# Patient Record
Sex: MALE | Race: WHITE | NOT HISPANIC OR LATINO | Employment: OTHER | URBAN - METROPOLITAN AREA
[De-identification: names, ages, dates, MRNs, and addresses within clinical notes are randomized per-mention and may not be internally consistent; named-entity substitution may affect disease eponyms.]

---

## 2017-01-12 ENCOUNTER — TELEPHONE (OUTPATIENT)
Dept: FAMILY MEDICINE | Facility: CLINIC | Age: 62
End: 2017-01-12

## 2017-01-12 NOTE — TELEPHONE ENCOUNTER
Patient due for colonoscopy/FIT test. Left message for patient to call back. Reminder letter sent in addition.  Anthony Garcia CMA

## 2017-01-12 NOTE — Clinical Note
[facility]  [address] [address]  [phone]  Dept: [phone]      January 12, 2017      Stephane Shaikh  [address] [place] [phone]        Dear Stephane Shaikh,     At [facility] we care about your health and are committed to providing quality patient care. Which includes staying current on preventive cancer screenings.  You can increase your chances of finding and treating cancers through regular screenings.      Our records indicate you may be due for the following preventive screening(s):    Colonoscopy  Colonoscopy is recommended every ten years for everyone age 50 and older. Please take a moment to read over the enclosed information packet about colon cancer screening. We strongly urge our patient's to consider having a colonoscopy done, which is the best screening test available and only needs to be done every 10 years if normal. If you are unwilling or unable to have a colonoscopy then we recommend the annual stool testing for blood. This test is called a FIT test and it looks for blood in the stool. A kit can be picked up from our clinic lab department.    To schedule an appointment or discuss this screening further, you may contact us by phone at the [facility] at [phone] or online through the patient [email or web address] @ [email or web address]    If you have had any of the screenings listed above at another facility, please call us so that we may update your chart.      Your partners in health,      Quality Committee at [facility]

## 2017-05-23 ENCOUNTER — TELEPHONE (OUTPATIENT)
Dept: FAMILY MEDICINE | Facility: CLINIC | Age: 62
End: 2017-05-23

## 2017-05-23 NOTE — LETTER
[facility]  [address] [address]  [phone]  Dept: [phone]      May 23, 2017      Stephane Shaikh  [address] [place] [id number]        Dear Sam,     At [facility] we care about your health and are committed to providing quality patient care. Which includes staying current on preventive cancer screenings.  You can increase your chances of finding and treating cancers through regular screenings.      Our records indicate you may be due for the following preventive screening(s):    Colonoscopy  Colonoscopy is recommended every ten years for everyone age 50 and older. Please take a moment to read over the enclosed information packet about colon cancer screening. We strongly urge our patient's to consider having a colonoscopy done, which is the best screening test available and only needs to be done every 10 years if normal.    If you are unwilling or unable to have a colonoscopy then we recommend the annual stool testing for blood. This test is called a FIT test and it looks for blood in the stool. You may request a kit from our clinic lab department then return back at your earliest convenience.    To schedule an appointment or discuss this screening further, you may contact us by phone at the [facility] at [phone] or online through the patient portal/[email or web address] @ [email or web address]    If you have had any of the screenings listed above at another facility, please call us so that we may update your chart.      Your partners in health,      Quality Committee at [facility]

## 2017-05-23 NOTE — TELEPHONE ENCOUNTER
Panel Management Review      BP Readings from Last 1 Encounters:   07/22/16 128/72    ,   Lab Results   Component Value Date    A1C 5.2 07/22/2016   , 1/12/2017    Fail List measure: Colonoscopy      Patient is due/failing the following:   COLONOSCOPY    Action needed:   Schedule colonoscopy    Type of outreach:    Phone, left message for patient to call back.  and Sent letter.    Questions for provider review:    None                                                                                                                                    Anthony Garcia      Chart routed to  .

## 2017-07-25 ENCOUNTER — TELEPHONE (OUTPATIENT)
Dept: FAMILY MEDICINE | Facility: CLINIC | Age: 62
End: 2017-07-25

## 2017-07-25 NOTE — TELEPHONE ENCOUNTER
7/25/2017    Call Regarding Preventive Health Screening Colonoscopy    Attempt 3    Message on voicemail     Comments: Clinic made 2 prior attempts       Outreach   CC

## 2018-06-07 ENCOUNTER — OFFICE VISIT (OUTPATIENT)
Dept: OPTOMETRY | Facility: CLINIC | Age: 63
End: 2018-06-07
Payer: COMMERCIAL

## 2018-06-07 DIAGNOSIS — H52.203 MYOPIA OF BOTH EYES WITH ASTIGMATISM AND PRESBYOPIA: Primary | ICD-10-CM

## 2018-06-07 DIAGNOSIS — H52.13 MYOPIA OF BOTH EYES WITH ASTIGMATISM AND PRESBYOPIA: Primary | ICD-10-CM

## 2018-06-07 DIAGNOSIS — H52.4 MYOPIA OF BOTH EYES WITH ASTIGMATISM AND PRESBYOPIA: Primary | ICD-10-CM

## 2018-06-07 PROCEDURE — 92004 COMPRE OPH EXAM NEW PT 1/>: CPT | Performed by: OPTOMETRIST

## 2018-06-07 PROCEDURE — 92015 DETERMINE REFRACTIVE STATE: CPT | Performed by: OPTOMETRIST

## 2018-06-07 ASSESSMENT — REFRACTION_MANIFEST
OD_SPHERE: -3.50
OS_AXIS: 062
OD_SPHERE: -3.25
OD_CYLINDER: +1.00
OD_AXIS: 125
OS_SPHERE: -3.00
OD_CYLINDER: +1.25
OS_CYLINDER: +0.75
OS_AXIS: 062
OD_AXIS: 115
OS_ADD: +2.50
OD_ADD: +2.50
OS_SPHERE: -3.00
OS_CYLINDER: +1.25

## 2018-06-07 ASSESSMENT — CONF VISUAL FIELD
OS_NORMAL: 1
METHOD: COUNTING FINGERS
OD_NORMAL: 1

## 2018-06-07 ASSESSMENT — VISUAL ACUITY
CORRECTION_TYPE: GLASSES
METHOD: SNELLEN - LINEAR
OS_CC+: -1
OD_SC: 20/200
OD_CC: 20/30-1
OS_CC: 20/20-1
OS_SC: 20/200
OD_CC: 20/20
OS_CC: 20/20

## 2018-06-07 ASSESSMENT — CUP TO DISC RATIO
OS_RATIO: 0.45
OD_RATIO: 0.5

## 2018-06-07 ASSESSMENT — SLIT LAMP EXAM - LIDS
COMMENTS: NORMAL
COMMENTS: NORMAL

## 2018-06-07 ASSESSMENT — TONOMETRY
OD_IOP_MMHG: 13
OS_IOP_MMHG: 15
IOP_METHOD: APPLANATION

## 2018-06-07 ASSESSMENT — REFRACTION_WEARINGRX
SPECS_TYPE: PAL
OS_SPHERE: -3.50
OD_AXIS: 120
OS_CYLINDER: +1.00
OS_ADD: +2.50
OD_SPHERE: -3.75
OD_ADD: +2.50
OD_CYLINDER: +1.25
OS_AXIS: 050

## 2018-06-07 ASSESSMENT — EXTERNAL EXAM - RIGHT EYE: OD_EXAM: NORMAL

## 2018-06-07 ASSESSMENT — EXTERNAL EXAM - LEFT EYE: OS_EXAM: NORMAL

## 2018-06-07 NOTE — PROGRESS NOTES
Chief Complaint   Patient presents with     COMPREHENSIVE EYE EXAM         Last Eye Exam: 5 years  Dilated Previously: Yes    What are you currently using to see?  glasses       Distance Vision Acuity: Satisfied with vision    Near Vision Acuity: Not satisfied  - easier to read book without glasses    Eye Comfort: good  Do you use eye drops? : No  Occupation or Hobbies: Teacher    Roxi Negron  [email or web address]            Medical, surgical and family histories reviewed and updated 6/7/2018.       OBJECTIVE: See Ophthalmology exam    ASSESSMENT:    ICD-10-CM    1. Myopia of both eyes with astigmatism and presbyopia H52.13     H52.203     H52.4       PLAN:     Patient Instructions   There was a change in the prescription for your glasses.    Your eyes may be blurry at near and sensitive to light for several hours from the dilating drops.    Yearly eye exams recommended.

## 2018-06-07 NOTE — PATIENT INSTRUCTIONS
There was a change in the prescription for your glasses.    Your eyes may be blurry at near and sensitive to light for several hours from the dilating drops.    Yearly eye exams recommended.

## 2018-06-07 NOTE — MR AVS SNAPSHOT
"              After Visit Summary   6/7/2018    Stephane Shaikh    MRN: [id number]           Patient Information     Date Of Birth          1955        Visit Information        Provider Department      6/7/2018 5:00 PM Joselin Adam OD [facility]        Today's Diagnoses     Myopia of both eyes with astigmatism and presbyopia    -  1      Care Instructions    There was a change in the prescription for your glasses.    Your eyes may be blurry at near and sensitive to light for several hours from the dilating drops.    Yearly eye exams recommended.            Follow-ups after your visit        Follow-up notes from your care team     Return in about 1 year (around 6/7/2019) for comprehensive eye exam.      Who to contact     If you have questions or need follow up information about today's clinic visit or your schedule please contact [facility] directly at [phone].  Normal or non-critical lab and imaging results will be communicated to you by MyChart, letter or phone within 4 business days after the clinic has received the results. If you do not hear from us within 7 days, please contact the clinic through MyChart or phone. If you have a critical or abnormal lab result, we will notify you by phone as soon as possible.  Submit refill requests through [email or web address] or call your pharmacy and they will forward the refill request to us. Please allow 3 business days for your refill to be completed.          Additional Information About Your Visit        MyChart Information     [email or web address] lets you send messages to your doctor, view your test results, renew your prescriptions, schedule appointments and more. To sign up, go to [email or web address] . Click on \"Log in\" on the left side of the screen, which will take you to the Welcome page. Then click on \"Sign up Now\" on the right side of the page.     You will be asked to enter the access code listed below, as well as some " personal information. Please follow the directions to create your username and password.     Your access code is: [email or web address]  Expires: 2018  6:07 PM     Your access code will  in 90 days. If you need help or a new code, please call your [place] clinic or [phone].        Care EveryWhere ID     This is your Care EveryWhere ID. This could be used by other organizations to access your [place] medical records  [id number]         Blood Pressure from Last 3 Encounters:   16 128/72   16 138/78   14 127/83    Weight from Last 3 Encounters:   16 129.3 kg (285 lb)   16 131.1 kg (289 lb)   14 (!) 140.2 kg (309 lb)              We Performed the Following     EYE EXAM (SIMPLE-NONBILLABLE)     REFRACTION        Primary Care Provider    None Specified       No primary provider on file.        Equal Access to Services     MILANA MCCLELLAN : Hadfer Acosta, waomada kayleigh, qacassta kaalmamina turner, mariza cid . So [facility] [phone].    ATENCIÓN: Si habla español, tiene a loaiza disposición servicios gratuitos de asistencia lingüística. Llame al [phone].    We comply with applicable federal civil rights laws and [place] laws. We do not discriminate on the basis of race, color, national origin, age, disability, sex, sexual orientation, or gender identity.            Thank you!     Thank you for choosing [facility]  for your care. Our goal is always to provide you with excellent care. Hearing back from our patients is one way we can continue to improve our services. Please take a few minutes to complete the written survey that you may receive in the mail after your visit with us. Thank you!             Your Updated Medication List - Protect others around you: Learn how to safely use, store and throw away your medicines at www.disposemymeds.org.      Notice  As of 2018  6:07 PM    You have not been prescribed any  medications.

## 2020-07-22 ENCOUNTER — OFFICE VISIT (OUTPATIENT)
Dept: OPTOMETRY | Facility: CLINIC | Age: 65
End: 2020-07-22
Payer: COMMERCIAL

## 2020-07-22 DIAGNOSIS — H52.13 MYOPIA OF BOTH EYES: ICD-10-CM

## 2020-07-22 DIAGNOSIS — Z01.00 EXAMINATION OF EYES AND VISION: Primary | ICD-10-CM

## 2020-07-22 DIAGNOSIS — H52.4 PRESBYOPIA: ICD-10-CM

## 2020-07-22 DIAGNOSIS — H52.223 REGULAR ASTIGMATISM OF BOTH EYES: ICD-10-CM

## 2020-07-22 PROCEDURE — 92014 COMPRE OPH EXAM EST PT 1/>: CPT | Performed by: OPTOMETRIST

## 2020-07-22 PROCEDURE — 92015 DETERMINE REFRACTIVE STATE: CPT | Performed by: OPTOMETRIST

## 2020-07-22 ASSESSMENT — REFRACTION_MANIFEST
OD_ADD: +2.75
OD_SPHERE: -3.50
OS_CYLINDER: +1.25
OS_ADD: +2.75
OS_AXIS: 062
OD_CYLINDER: +1.50
OS_SPHERE: -3.25
OD_AXIS: 115

## 2020-07-22 ASSESSMENT — EXTERNAL EXAM - LEFT EYE: OS_EXAM: NORMAL

## 2020-07-22 ASSESSMENT — VISUAL ACUITY
OS_CC+: -1
OS_CC: 20/30
OD_CC: 20/20-1
OS_CC: 20/20-1
OD_SC: 20/400-
CORRECTION_TYPE: GLASSES
OD_CC+: -2
METHOD: SNELLEN - LINEAR
OS_SC: 20/150
OD_CC: 20/20

## 2020-07-22 ASSESSMENT — REFRACTION_WEARINGRX
OS_ADD: +2.50
OS_CYLINDER: +1.25
OD_AXIS: 115
SPECS_TYPE: PAL
OD_CYLINDER: +1.25
OS_AXIS: 062
OD_ADD: +2.50
OD_SPHERE: -3.25
OS_SPHERE: -3.00

## 2020-07-22 ASSESSMENT — TONOMETRY
OD_IOP_MMHG: 14
OS_IOP_MMHG: 14
IOP_METHOD: TONOPEN

## 2020-07-22 ASSESSMENT — CONF VISUAL FIELD
OD_NORMAL: 1
OS_NORMAL: 1

## 2020-07-22 ASSESSMENT — CUP TO DISC RATIO
OD_RATIO: 0.5
OS_RATIO: 0.45

## 2020-07-22 ASSESSMENT — SLIT LAMP EXAM - LIDS
COMMENTS: NORMAL
COMMENTS: NORMAL

## 2020-07-22 ASSESSMENT — EXTERNAL EXAM - RIGHT EYE: OD_EXAM: NORMAL

## 2020-07-22 NOTE — LETTER
7/22/2020         RE: Stephane Shaikh  [address] [place] [phone]        Dear Colleague,    Thank you for referring your patient, Stephane Shaikh, to the [facility]. Please see a copy of my visit note below.    Chief Complaint   Patient presents with     Annual Eye Exam      Accompanied by self  Last Eye Exam: 6-7-2018  Dilated Previously: Yes    What are you currently using to see?  glasses       Distance Vision Acuity: Noticed gradual change in both eyes    Near Vision Acuity: Satisfied with vision while reading  with glasses    Eye Comfort: good  Do you use eye drops? 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          Medical, surgical and family histories reviewed and updated 7/22/2020.       OBJECTIVE: See Ophthalmology exam    ASSESSMENT:    ICD-10-CM    1. Examination of eyes and vision  Z01.00 EYE EXAM (SIMPLE-NONBILLABLE)   2. Myopia of both eyes  H52.13 REFRACTION   3. Regular astigmatism of both eyes  H52.223 REFRACTION   4. Presbyopia  H52.4 REFRACTION      PLAN:     Patient Instructions   Eyeglass prescription given.    Return in 1 year for a complete eye exam or sooner if needed.    Ric Melgar OD           Again, thank you for allowing me to participate in the care of your patient.        Sincerely,        Ric Melgar OD

## 2020-07-22 NOTE — PATIENT INSTRUCTIONS
Eyeglass prescription given.    Return in 1 year for a complete eye exam or sooner if needed.    Ric Melgar OD      The affects of the dilating drops last for 4- 6 hours.  You will be more sensitive to light and vision will be blurry up close.  Mydriatic sunglasses were given if needed.      Optometry Providers       Clinic Locations                                 Telephone Number   Dr. Sandra Chávez [phone]     [facility] Hours:                [facility] Hours:       [facility] Hours:   [address]   [address]     [address]  [place] [address]   [place] [address]    [place] [address]  Phone: [phone]                    Phone: [phone]     Phone: [phone]                      Monday 8:00-7:00                          Monday 8:00-7:00                          Monday 8:00-7:00              Tuesday 8:00-6:00                          Tuesday 8:00-7:00                          Tuesday 8:00-7:00              Wednesday 8:00-6:00                  Wednesday 8:00-7:00                   Wednesday 8:00-7:00      Thursday 8:00-6:00                        Thursday 8:00-7:00                         Thursday 8:00-7:00            Friday 8:00-5:00                              Friday 8:00-5:00                              Friday 8:00-5:00    [facility] Hours:   [address] [facility] [place] [address]  [phone]    Monday 8:00-7:00  Tuesday 8:00-7:00  Wednesday 8:00-7:00  Thursday 8:00-7:00  Friday 8:00-5:00  Please log on to [email or web address] to order your contact lenses.  The link is found on the Eye Care and Vision Services page.  As always, Thank you for trusting us with your health care needs!

## 2020-07-22 NOTE — PROGRESS NOTES
Chief Complaint   Patient presents with     Annual Eye Exam      Accompanied by self  Last Eye Exam: 6-7-2018  Dilated Previously: Yes    What are you currently using to see?  glasses       Distance Vision Acuity: Noticed gradual change in both eyes    Near Vision Acuity: Satisfied with vision while reading  with glasses    Eye Comfort: good  Do you use eye drops? 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          Medical, surgical and family histories reviewed and updated 7/22/2020.       OBJECTIVE: See Ophthalmology exam    ASSESSMENT:    ICD-10-CM    1. Examination of eyes and vision  Z01.00 EYE EXAM (SIMPLE-NONBILLABLE)   2. Myopia of both eyes  H52.13 REFRACTION   3. Regular astigmatism of both eyes  H52.223 REFRACTION   4. Presbyopia  H52.4 REFRACTION      PLAN:     Patient Instructions   Eyeglass prescription given.    Return in 1 year for a complete eye exam or sooner if needed.    Ric Melgar, OD

## 2021-06-10 NOTE — PROGRESS NOTES
Assessment & Plan     (D33.3) Acoustic neuroma (H)  (primary encounter diagnosis)  Comment:   Plan: OTOLARYNGOLOGY REFERRAL        He had in his chart acoustic neuroma there was an MRI from a few years ago that did not show any abnormalities.  He states he was told he had an acoustic neuroma.  We will have him see ENT.  He still has hearing changes on the left side.    (Z12.5) Screening for prostate cancer  Comment:   Plan: PSA, screen        Check PSA    (Z13.220) Lipid screening  Comment:   Plan: Lipid Profile        He is fasting would like to get a lipid profile    (Z12.11) Screening for colon cancer  Comment:   Plan: COLOGUARD([employer])        He is willing to do a Cologuard prescription sent    (Z68.34) BMI 34.0-34.9,adult  Comment: His BMI is coming down his previous weight was 310 he is now down to 235.  Plan:                          HELENE Alarcon MD  [facility] - KERI Vargas is a 66 year old who presents for the following health issues     HPI     New Patient/Transfer of Care  New patient he did have a diagnosis of acoustic neuroma still has hearing changes on the left side.  He was gaining a lot of weight and he worked hard with diet and exercise and has lost significant amount of weight and is feeling much better.  We will do fasting labs today note he has no chest pain or shortness of breath and does not smoke    Review of Systems   Constitutional, HEENT, cardiovascular, pulmonary, gi and gu systems are negative, except as otherwise noted.      Objective    /80   Pulse 65   Temp 96.9  F (36.1  C)   Resp 18   Wt 111.1 kg (245 lb)   SpO2 98%   BMI 34.19 kg/m    Body mass index is 34.19 kg/m .  Physical Exam   GENERAL: healthy, alert and no distress  EYES: Eyes grossly normal to inspection, PERRL and conjunctivae and sclerae normal  NECK: no adenopathy, no asymmetry, masses, or scars and thyroid normal to palpation  RESP: lungs clear to auscultation - no  rales, rhonchi or wheezes  CV: regular rate and rhythm, normal S1 S2, no S3 or S4, no murmur, click or rub, no peripheral edema and peripheral pulses strong  ABDOMEN: soft, nontender, no hepatosplenomegaly, no masses and bowel sounds normal  MS: no gross musculoskeletal defects noted, no edema

## 2021-06-15 ENCOUNTER — OFFICE VISIT (OUTPATIENT)
Dept: FAMILY MEDICINE | Facility: OTHER | Age: 66
End: 2021-06-15
Attending: FAMILY MEDICINE
Payer: COMMERCIAL

## 2021-06-15 VITALS
DIASTOLIC BLOOD PRESSURE: 80 MMHG | OXYGEN SATURATION: 98 % | HEART RATE: 65 BPM | RESPIRATION RATE: 18 BRPM | WEIGHT: 245 LBS | BODY MASS INDEX: 34.19 KG/M2 | SYSTOLIC BLOOD PRESSURE: 138 MMHG | TEMPERATURE: 96.9 F

## 2021-06-15 DIAGNOSIS — D33.3 ACOUSTIC NEUROMA (H): Primary | ICD-10-CM

## 2021-06-15 DIAGNOSIS — Z12.11 SCREENING FOR COLON CANCER: ICD-10-CM

## 2021-06-15 DIAGNOSIS — Z13.220 LIPID SCREENING: Primary | ICD-10-CM

## 2021-06-15 DIAGNOSIS — Z12.5 SCREENING FOR PROSTATE CANCER: ICD-10-CM

## 2021-06-15 DIAGNOSIS — E78.5 HYPERLIPIDEMIA LDL GOAL <100: ICD-10-CM

## 2021-06-15 DIAGNOSIS — Z13.220 LIPID SCREENING: ICD-10-CM

## 2021-06-15 LAB
CHOLEST SERPL-MCNC: 208 MG/DL
HDLC SERPL-MCNC: 43 MG/DL
LDLC SERPL CALC-MCNC: 119 MG/DL
NONHDLC SERPL-MCNC: 165 MG/DL
PSA SERPL-ACNC: 1 UG/L (ref 0–4)
TRIGL SERPL-MCNC: 228 MG/DL

## 2021-06-15 PROCEDURE — 99203 OFFICE O/P NEW LOW 30 MIN: CPT | Performed by: FAMILY MEDICINE

## 2021-06-15 PROCEDURE — G0103 PSA SCREENING: HCPCS | Performed by: FAMILY MEDICINE

## 2021-06-15 PROCEDURE — 36415 COLL VENOUS BLD VENIPUNCTURE: CPT | Performed by: FAMILY MEDICINE

## 2021-06-15 PROCEDURE — 80061 LIPID PANEL: CPT | Performed by: FAMILY MEDICINE

## 2021-06-15 RX ORDER — ATORVASTATIN CALCIUM 10 MG/1
10 TABLET, FILM COATED ORAL DAILY
Qty: 90 TABLET | Refills: 3 | Status: SHIPPED | OUTPATIENT
Start: 2021-06-15 | End: 2022-06-14

## 2021-06-15 ASSESSMENT — ANXIETY QUESTIONNAIRES
4. TROUBLE RELAXING: NOT AT ALL
7. FEELING AFRAID AS IF SOMETHING AWFUL MIGHT HAPPEN: NOT AT ALL
6. BECOMING EASILY ANNOYED OR IRRITABLE: NOT AT ALL
2. NOT BEING ABLE TO STOP OR CONTROL WORRYING: NOT AT ALL
3. WORRYING TOO MUCH ABOUT DIFFERENT THINGS: NOT AT ALL
5. BEING SO RESTLESS THAT IT IS HARD TO SIT STILL: NOT AT ALL
GAD7 TOTAL SCORE: 0
1. FEELING NERVOUS, ANXIOUS, OR ON EDGE: NOT AT ALL

## 2021-06-15 ASSESSMENT — PATIENT HEALTH QUESTIONNAIRE - PHQ9: SUM OF ALL RESPONSES TO PHQ QUESTIONS 1-9: 0

## 2021-06-15 ASSESSMENT — PAIN SCALES - GENERAL: PAINLEVEL: NO PAIN (0)

## 2021-06-15 NOTE — NURSING NOTE
"Chief Complaint   Patient presents with     Establish Care       Initial /80   Pulse 65   Temp 96.9  F (36.1  C)   Resp 18   Wt 111.1 kg (245 lb)   SpO2 98%   BMI 34.19 kg/m   Estimated body mass index is 34.19 kg/m  as calculated from the following:    Height as of 7/22/16: 1.803 m (5' 10.98\").    Weight as of this encounter: 111.1 kg (245 lb).  Medication Reconciliation: callie Giraldo  "

## 2021-06-16 ASSESSMENT — ANXIETY QUESTIONNAIRES: GAD7 TOTAL SCORE: 0

## 2021-06-22 NOTE — PROGRESS NOTES
Otolaryngology Consultation    Patient: Stephane Shaikh  : 1955    Patient presents with:  Consult: Pt has been referred by Dr. Pizano for an acoustic neuroma.      HPI:  Stephane Shaikh is a 66 year old male seen today for concerns of acoustic neuroma.   He has a hx of sudden hearing loss. He was seen on 6/15/21 for establish medical care by Dr. Alarcon. At that time, he was describing a sudden hearing loss. He was treated for SSNHL with prednisone and complete MRI of IAC. Imaging reviewed from  and was negative for neuroma.     Today, he presents for concerns of hearing changes and hx of sudden HL.   His sudden hearing loss developed in , and was seen about 1 month after onset by ENT. He was noted to have significant hearing loss change on left and was started on Prednisone, but Sam does not recall taking prednisone.   He had no improvement and reports hearing remains poor and interested in ANG    Sam did complete imaging of MRI of IAC to examine IAC. His ear exam was normal and IAC were clear. MRI report reviewed under imaging.       Denies COM.   Hx of childhood BTT. No adult OM.   Denies otalgia, otorrhea.  Denies worrisome tinnitus.  Denies fluctuating hearing loss or tinnitus.   Denies vertigo or facial paraesthesia.   Hx of noise exposure, .   He has HP   Family Hx of HL, age related.     Audiogram- 14  Type A tympanograms  Thresholds are slight sloping to severe right and profound on left.  SRT=PTA  WRS-        Audiogram- 21  Type A tympanograms  Thresholds are overall stable compared to 2014- slight sloping to severe right and profound left asymmetrical SNHL.   SRT=PTA  WRS-  Right- 100%@70dB  Left- 60% @95 dB      MRI BRAIN WITHOUT AND WITH CONTRAST  2014 11:05 AM     HISTORY:  Hearing loss in left ear. Intermittent vertigo.      TECHNIQUE:  Multiplanar, multisequence MRI of the brain without and  with 14 mL IV Gadavist.     COMPARISON: None.     FINDINGS:  The  "brain parenchyma, ventricles and subarachnoid spaces  appear normal.  There is no evidence of hemorrhage, mass, acute  infarct, or anomaly.  There are no gadolinium enhancing lesions.       The acoustic pathways appear normal.  The temporal bones, internal  auditory canals and cerebellopontine angles appear normal with no  abnormal signal or enhancement in the labyrinths.     The facial structures appear normal. The arteries at the base of the  brain and the dural venous sinuses appear patent.      IMPRESSION  IMPRESSION:       Normal MRI of the brain.       Current Outpatient Rx   Medication Sig Dispense Refill     atorvastatin (LIPITOR) 10 MG tablet Take 1 tablet (10 mg) by mouth daily 90 tablet 3       Allergies: Patient has no known allergies.     Past Medical History:   Diagnosis Date     Acoustic neuroma (H) 6/14/2016    left      Heart murmur      History of vasectomy 1996     Hx of tonsillectomy      Obesity, Class III, BMI 40-49.9 (morbid obesity) (H) 6/23/2014     Rheumatic fever        Past Surgical History:   Procedure Laterality Date     ARTHROSCOPY KNEE RT/LT Right remote     ARTHROSCOPY SHOULDER RT/LT Right 2011    rotator cuff repair       ENT family history reviewed    Social History     Tobacco Use     Smoking status: Never Smoker     Smokeless tobacco: Never Used   Substance Use Topics     Alcohol use: Yes     Alcohol/week: 0.0 - 1.0 standard drinks     Drug use: No       Review of Systems  ROS: 10 point ROS neg other than the symptoms noted above in the HPI     Physical Exam  /76 (Cuff Size: Adult Regular)   Pulse 66   Temp 97.9  F (36.6  C) (Tympanic)   Ht 1.803 m (5' 11\")   Wt 108 kg (238 lb)   SpO2 98%   BMI 33.19 kg/m    General - The patient is well nourished and well developed, and appears to have good nutritional status.  Alert and oriented to person and place, answers questions and cooperates with examination appropriately.   Head and Face - Normocephalic and atraumatic, with " no gross asymmetry noted.  The facial nerve is intact, with strong symmetric movements.  Voice and Breathing - The patient was breathing comfortably without the use of accessory muscles. There was no wheezing, stridor, or stertor.  The patients voice was clear and strong, and had appropriate pitch and quality.  Ears - Ears examined with otoscope and under otologic microscopy. The external auditory canals are patent, the tympanic membranes are intact without effusion, retraction or mass.  Bony landmarks are intact.  Eyes - Extraocular movements intact, and the pupils were reactive to light.  Sclera were not icteric or injected, conjunctiva were pink and moist.  Mouth - Examination of the oral cavity showed pink, healthy oral mucosa. No lesions or ulcerations noted.  The tongue was mobile and midline, and the dentition were in good condition.    Throat - The walls of the oropharynx were smooth, pink, moist, symmetric, and had no lesions or ulcerations.  The tonsillar pillars and soft palate were symmetric.  The uvula was midline on elevation.    Neck - Normal midline excursion of the laryngotracheal complex during swallowing.  Full range of motion on passive movement.  Palpation of the occipital, submental, submandibular, internal jugular chain, and supraclavicular nodes did not demonstrate any abnormal lymph nodes or masses.  Palpation of the thyroid was soft and smooth, with no nodules or goiter appreciated.  The trachea was mobile and midline.  Nose - External contour is symmetric, no gross deflection or scars.  Nasal mucosa is pink and moist with no abnormal mucus.    NEURO:  CN intact, ambulates without difficulty.  no focal deficits noted.      ASSESSMENT:      ICD-10-CM    1. ASNHL (asymmetrical sensorineural hearing loss)  H90.5 AUDIOLOGY ADULT REFERRAL   2. Hx of sudden hearing loss  Z86.69 AUDIOLOGY ADULT REFERRAL   3. Decreased hearing, bilateral  H91.93 AUDIOLOGY ADULT REFERRAL       Reviewed prior imaging  from 2014, there is no documented acoustic neuroma. We discussed repeating the MRI, however, he has no new symptoms. Recommended audiogram to ensure stable results. Further consider HAC.     Reviewed audiogram which was completed today. He has stable ASNHL since 2014.   Annual audiogram  Proceed with HAC.   Hearing protection  Return to ENT sooner, if there are any new symptoms which develop.   He agrees with this plan.      A follow-up audiogram is recommended in 12 months.  This should be sooner if a patient develops vertigo, new or asymmetric hearing loss or unresolved unilateral tinnitus.  If these symptoms were to develop, an MRI would need to be obtained.    The recommendations from the tinnitus brochure were discussed.  The most common reason for tinnitus is damage to the microscopic endings of the hearing nerve in the inner ear.  Injury to the nerve endings brings on hearing loss and often tinnitus.  Advancing age can accompany hearing loss and tinnitus.  If a person is younger, loud noise probably is the leading cause of tinnitus.  Delayed damage to hearing is often seen as well.  Ear protection from noise exposure was reviewed.  Highlights included low salt diet, control of hypertension, avoiding stimulants such as caffeine, tobacco or alcohol and proper sleep, exercise and stress management.            Emilia Barr PA-C  ENT  [facility], [place]

## 2021-06-22 NOTE — PATIENT INSTRUCTIONS
Complete audiogram.   Hearing protection.   Monitor hearing/ annual audiogram.         Thank you for allowing Emilia Barr PA-C and our ENT team to participate in your care.  If your medications are too expensive, please give the nurse a call.  We can possibly change this medication.  If you have a scheduling or an appointment question please contact our Health Unit Coordinator at [phone], Ext. [phone].    ALL nursing questions or concerns can be directed to your ENT nurse at: [phone] [place]

## 2021-06-23 ENCOUNTER — OFFICE VISIT (OUTPATIENT)
Dept: AUDIOLOGY | Facility: OTHER | Age: 66
End: 2021-06-23
Attending: AUDIOLOGIST
Payer: COMMERCIAL

## 2021-06-23 ENCOUNTER — OFFICE VISIT (OUTPATIENT)
Dept: OTOLARYNGOLOGY | Facility: OTHER | Age: 66
End: 2021-06-23
Attending: FAMILY MEDICINE

## 2021-06-23 ENCOUNTER — TELEPHONE (OUTPATIENT)
Dept: OTOLARYNGOLOGY | Facility: OTHER | Age: 66
End: 2021-06-23

## 2021-06-23 VITALS
TEMPERATURE: 97.9 F | DIASTOLIC BLOOD PRESSURE: 76 MMHG | OXYGEN SATURATION: 98 % | WEIGHT: 238 LBS | SYSTOLIC BLOOD PRESSURE: 132 MMHG | HEART RATE: 66 BPM | BODY MASS INDEX: 33.32 KG/M2 | HEIGHT: 71 IN

## 2021-06-23 DIAGNOSIS — H91.93 DECREASED HEARING OF BOTH EARS: ICD-10-CM

## 2021-06-23 DIAGNOSIS — H91.93 DECREASED HEARING OF BOTH EARS: Primary | ICD-10-CM

## 2021-06-23 DIAGNOSIS — Z86.69 HX OF SUDDEN HEARING LOSS: ICD-10-CM

## 2021-06-23 DIAGNOSIS — H90.3 SENSORINEURAL HEARING LOSS, ASYMMETRICAL: Primary | ICD-10-CM

## 2021-06-23 DIAGNOSIS — H91.93 DECREASED HEARING, BILATERAL: ICD-10-CM

## 2021-06-23 DIAGNOSIS — H90.3 ASNHL (ASYMMETRICAL SENSORINEURAL HEARING LOSS): Primary | ICD-10-CM

## 2021-06-23 PROCEDURE — 99213 OFFICE O/P EST LOW 20 MIN: CPT | Mod: 25 | Performed by: PHYSICIAN ASSISTANT

## 2021-06-23 PROCEDURE — 92504 EAR MICROSCOPY EXAMINATION: CPT | Performed by: PHYSICIAN ASSISTANT

## 2021-06-23 PROCEDURE — 92550 TYMPANOMETRY & REFLEX THRESH: CPT | Performed by: AUDIOLOGIST

## 2021-06-23 PROCEDURE — 92557 COMPREHENSIVE HEARING TEST: CPT | Performed by: AUDIOLOGIST

## 2021-06-23 ASSESSMENT — MIFFLIN-ST. JEOR: SCORE: 1891.69

## 2021-06-23 ASSESSMENT — PAIN SCALES - GENERAL: PAINLEVEL: NO PAIN (0)

## 2021-06-23 NOTE — PROGRESS NOTES
Audiology Evaluation Completed. Please refer SCANNED AUDIOGRAM and/or TYMPANOGRAM for BACKGROUND, RESULTS, RECOMMENDATIONS.      Dolly GOODSON, [facility]  Audiologist #[id number]

## 2021-06-23 NOTE — TELEPHONE ENCOUNTER
Talked to patient about his medical insurance and told him due to him not having medical it would be about 200 to see our audiologist. He them told me he had it and we keep scanning the card  in to his chart. When he was checked in for ENT they check and it said it was invalid. I asked if he had another card and he said no he has had this one for 30 years.  I told him I could transfer the phone to [facility] and he said no he will come in and talk to them so he can call the insurance company when he is here to prove he has it.

## 2021-06-23 NOTE — NURSING NOTE
"Chief Complaint   Patient presents with     Consult     Pt has been referred by Dr. Pizano for an acoustic neuroma.       Initial /76 (Cuff Size: Adult Regular)   Pulse 66   Temp 97.9  F (36.6  C) (Tympanic)   Ht 1.803 m (5' 11\")   Wt 108 kg (238 lb)   SpO2 98%   BMI 33.19 kg/m   Estimated body mass index is 33.19 kg/m  as calculated from the following:    Height as of this encounter: 1.803 m (5' 11\").    Weight as of this encounter: 108 kg (238 lb).  Medication Reconciliation: complete  Willow Ram LPN    "

## 2021-06-23 NOTE — LETTER
2021         RE: Stephane Shaikh  [address] [address]        Dear Colleague,    Thank you for referring your patient, Stephane Shaikh, to the [facility]. Please see a copy of my visit note below.    Otolaryngology Consultation    Patient: Stephane Shaikh  : 1955    Patient presents with:  Consult: Pt has been referred by Dr. Pizano for an acoustic neuroma.      HPI:  Stephane Shaikh is a 66 year old male seen today for concerns of acoustic neuroma.   He has a hx of sudden hearing loss. He was seen on 6/15/21 for establish medical care by Dr. Alarcon. At that time, he was describing a sudden hearing loss. He was treated for SSNHL with prednisone and complete MRI of IAC. Imaging reviewed from  and was negative for neuroma.     Today, he presents for concerns of hearing changes and hx of sudden HL.   His sudden hearing loss developed in , and was seen about 1 month after onset by ENT. He was noted to have significant hearing loss change on left and was started on Prednisone, but Sam does not recall taking prednisone.   He had no improvement and reports hearing remains poor and interested in ANG    Sam did complete imaging of MRI of IAC to examine IAC. His ear exam was normal and IAC were clear. MRI report reviewed under imaging.       Denies COM.   Hx of childhood BTT. No adult OM.   Denies otalgia, otorrhea.  Denies worrisome tinnitus.  Denies fluctuating hearing loss or tinnitus.   Denies vertigo or facial paraesthesia.   Hx of noise exposure, .   He has HP   Family Hx of HL, age related.     Audiogram- 14  Type A tympanograms  Thresholds are slight sloping to severe right and profound on left.  SRT=PTA  WRS-        Audiogram- 21  Type A tympanograms  Thresholds are overall stable compared to 2014- slight sloping to severe right and profound left asymmetrical SNHL.   SRT=PTA  WRS-  Right- 100%@70dB  Left- 60% @95 dB      MRI BRAIN  "WITHOUT AND WITH CONTRAST  6/23/2014 11:05 AM     HISTORY:  Hearing loss in left ear. Intermittent vertigo.      TECHNIQUE:  Multiplanar, multisequence MRI of the brain without and  with 14 mL IV Gadavist.     COMPARISON: None.     FINDINGS:  The brain parenchyma, ventricles and subarachnoid spaces  appear normal.  There is no evidence of hemorrhage, mass, acute  infarct, or anomaly.  There are no gadolinium enhancing lesions.       The acoustic pathways appear normal.  The temporal bones, internal  auditory canals and cerebellopontine angles appear normal with no  abnormal signal or enhancement in the labyrinths.     The facial structures appear normal. The arteries at the base of the  brain and the dural venous sinuses appear patent.      IMPRESSION  IMPRESSION:       Normal MRI of the brain.       Current Outpatient Rx   Medication Sig Dispense Refill     atorvastatin (LIPITOR) 10 MG tablet Take 1 tablet (10 mg) by mouth daily 90 tablet 3       Allergies: Patient has no known allergies.     Past Medical History:   Diagnosis Date     Acoustic neuroma (H) 6/14/2016    left      Heart murmur      History of vasectomy 1996     Hx of tonsillectomy      Obesity, Class III, BMI 40-49.9 (morbid obesity) (H) 6/23/2014     Rheumatic fever        Past Surgical History:   Procedure Laterality Date     ARTHROSCOPY KNEE RT/LT Right remote     ARTHROSCOPY SHOULDER RT/LT Right 2011    rotator cuff repair       ENT family history reviewed    Social History     Tobacco Use     Smoking status: Never Smoker     Smokeless tobacco: Never Used   Substance Use Topics     Alcohol use: Yes     Alcohol/week: 0.0 - 1.0 standard drinks     Drug use: No       Review of Systems  ROS: 10 point ROS neg other than the symptoms noted above in the HPI     Physical Exam  /76 (Cuff Size: Adult Regular)   Pulse 66   Temp 97.9  F (36.6  C) (Tympanic)   Ht 1.803 m (5' 11\")   Wt 108 kg (238 lb)   SpO2 98%   BMI 33.19 kg/m    General - The " patient is well nourished and well developed, and appears to have good nutritional status.  Alert and oriented to person and place, answers questions and cooperates with examination appropriately.   Head and Face - Normocephalic and atraumatic, with no gross asymmetry noted.  The facial nerve is intact, with strong symmetric movements.  Voice and Breathing - The patient was breathing comfortably without the use of accessory muscles. There was no wheezing, stridor, or stertor.  The patients voice was clear and strong, and had appropriate pitch and quality.  Ears - Ears examined with otoscope and under otologic microscopy. The external auditory canals are patent, the tympanic membranes are intact without effusion, retraction or mass.  Bony landmarks are intact.  Eyes - Extraocular movements intact, and the pupils were reactive to light.  Sclera were not icteric or injected, conjunctiva were pink and moist.  Mouth - Examination of the oral cavity showed pink, healthy oral mucosa. No lesions or ulcerations noted.  The tongue was mobile and midline, and the dentition were in good condition.    Throat - The walls of the oropharynx were smooth, pink, moist, symmetric, and had no lesions or ulcerations.  The tonsillar pillars and soft palate were symmetric.  The uvula was midline on elevation.    Neck - Normal midline excursion of the laryngotracheal complex during swallowing.  Full range of motion on passive movement.  Palpation of the occipital, submental, submandibular, internal jugular chain, and supraclavicular nodes did not demonstrate any abnormal lymph nodes or masses.  Palpation of the thyroid was soft and smooth, with no nodules or goiter appreciated.  The trachea was mobile and midline.  Nose - External contour is symmetric, no gross deflection or scars.  Nasal mucosa is pink and moist with no abnormal mucus.    NEURO:  CN intact, ambulates without difficulty.  no focal deficits noted.      ASSESSMENT:       ICD-10-CM    1. ASNHL (asymmetrical sensorineural hearing loss)  H90.5 AUDIOLOGY ADULT REFERRAL   2. Hx of sudden hearing loss  Z86.69 AUDIOLOGY ADULT REFERRAL   3. Decreased hearing, bilateral  H91.93 AUDIOLOGY ADULT REFERRAL       Reviewed prior imaging from 2014, there is no documented acoustic neuroma. We discussed repeating the MRI, however, he has no new symptoms. Recommended audiogram to ensure stable results. Further consider HAC.     Reviewed audiogram which was completed today. He has stable ASNHL since 2014.   Annual audiogram  Proceed with HAC.   Hearing protection  Return to ENT sooner, if there are any new symptoms which develop.   He agrees with this plan.      A follow-up audiogram is recommended in 12 months.  This should be sooner if a patient develops vertigo, new or asymmetric hearing loss or unresolved unilateral tinnitus.  If these symptoms were to develop, an MRI would need to be obtained.    The recommendations from the tinnitus brochure were discussed.  The most common reason for tinnitus is damage to the microscopic endings of the hearing nerve in the inner ear.  Injury to the nerve endings brings on hearing loss and often tinnitus.  Advancing age can accompany hearing loss and tinnitus.  If a person is younger, loud noise probably is the leading cause of tinnitus.  Delayed damage to hearing is often seen as well.  Ear protection from noise exposure was reviewed.  Highlights included low salt diet, control of hypertension, avoiding stimulants such as caffeine, tobacco or alcohol and proper sleep, exercise and stress management.            Emilia Barr PA-C  ENT  [facility] [place]          Again, thank you for allowing me to participate in the care of your patient.        Sincerely,        Emilia Barr PA-C

## 2021-07-12 LAB — COLOGUARD-ABSTRACT: NEGATIVE

## 2021-09-16 ENCOUNTER — LAB (OUTPATIENT)
Dept: LAB | Facility: OTHER | Age: 66
End: 2021-09-16
Payer: COMMERCIAL

## 2021-09-16 DIAGNOSIS — E78.5 HYPERLIPIDEMIA LDL GOAL <100: ICD-10-CM

## 2021-09-16 LAB
AST SERPL W P-5'-P-CCNC: 18 U/L (ref 0–45)
CHOLEST SERPL-MCNC: 127 MG/DL
FASTING STATUS PATIENT QL REPORTED: YES
HDLC SERPL-MCNC: 55 MG/DL
LDLC SERPL CALC-MCNC: 49 MG/DL
NONHDLC SERPL-MCNC: 72 MG/DL
TRIGL SERPL-MCNC: 114 MG/DL

## 2021-09-16 PROCEDURE — 84450 TRANSFERASE (AST) (SGOT): CPT

## 2021-09-16 PROCEDURE — 36415 COLL VENOUS BLD VENIPUNCTURE: CPT

## 2021-09-16 PROCEDURE — 80061 LIPID PANEL: CPT

## 2022-02-01 NOTE — TELEPHONE ENCOUNTER
RECORDS RECEIVED FROM: (L) knee pain/ * no imaging*/    DATE RECEIVED: May 10, 2022     NOTES STATUS DETAILS   No records/images

## 2022-05-09 DIAGNOSIS — M25.562 LEFT KNEE PAIN, UNSPECIFIED CHRONICITY: Primary | ICD-10-CM

## 2022-05-10 ENCOUNTER — PRE VISIT (OUTPATIENT)
Dept: ORTHOPEDICS | Facility: CLINIC | Age: 67
End: 2022-05-10

## 2022-05-10 ENCOUNTER — OFFICE VISIT (OUTPATIENT)
Dept: ORTHOPEDICS | Facility: CLINIC | Age: 67
End: 2022-05-10
Payer: COMMERCIAL

## 2022-05-10 VITALS — HEIGHT: 70 IN | WEIGHT: 250 LBS | BODY MASS INDEX: 35.79 KG/M2

## 2022-05-10 DIAGNOSIS — M17.12 ARTHRITIS OF LEFT KNEE: Primary | ICD-10-CM

## 2022-05-10 DIAGNOSIS — D33.3 ACOUSTIC NEUROMA (H): ICD-10-CM

## 2022-05-10 PROCEDURE — 99203 OFFICE O/P NEW LOW 30 MIN: CPT | Mod: 25 | Performed by: PHYSICIAN ASSISTANT

## 2022-05-10 PROCEDURE — 20610 DRAIN/INJ JOINT/BURSA W/O US: CPT | Mod: LT | Performed by: PHYSICIAN ASSISTANT

## 2022-05-10 RX ORDER — TRIAMCINOLONE ACETONIDE 40 MG/ML
40 INJECTION, SUSPENSION INTRA-ARTICULAR; INTRAMUSCULAR
Status: DISCONTINUED | OUTPATIENT
Start: 2022-05-10 | End: 2023-05-11

## 2022-05-10 RX ORDER — LIDOCAINE HYDROCHLORIDE 10 MG/ML
9 INJECTION, SOLUTION EPIDURAL; INFILTRATION; INTRACAUDAL; PERINEURAL
Status: DISCONTINUED | OUTPATIENT
Start: 2022-05-10 | End: 2023-05-11

## 2022-05-10 RX ADMIN — TRIAMCINOLONE ACETONIDE 40 MG: 40 INJECTION, SUSPENSION INTRA-ARTICULAR; INTRAMUSCULAR at 13:47

## 2022-05-10 RX ADMIN — LIDOCAINE HYDROCHLORIDE 9 ML: 10 INJECTION, SOLUTION EPIDURAL; INFILTRATION; INTRACAUDAL; PERINEURAL at 13:47

## 2022-05-10 NOTE — NURSING NOTE
"Reason For Visit:   Chief Complaint   Patient presents with     Consult     (L) knee pain       Primary MD: No primary care provider on file.  Ref. MD: Self    ?  No  Occupation retired.    Date of injury: No  Type of injury: No.    Date of surgery: 30 years ago  Type of surgery: scope L knee    Smoker: No  Request smoking cessation information: No    Ht 1.786 m (5' 10.32\")   Wt 113.4 kg (250 lb)   BMI 35.55 kg/m      Pain Assessment  Patient Currently in Pain: Yes  0-10 Pain Scale: 2  Primary Pain Location: Knee          Susan Formato, LPN  "

## 2022-05-10 NOTE — PROGRESS NOTES
Department of Orthopedic Surgery  Lorena Tariq MD        PATIENT NAME: Stephane Shaikh   MRN: [id number]  AGE: 67 year old  BMI: Body mass index is 35.55 kg/m .  REFERRING PHYSICIAN: No ref. provider found      CHIEF COMPLAINT: Consult ((L) knee pain)      HISTORY OF PRESENT ILLNESS:  Stephane Shaikh is a 67 year old male who presents with approximately 5-month history of onset of atraumatic left knee pain.  He has noticed that both knees have begun to look more bowed but is not causing him any issues or pain.  He states in general, pain improves with rest, but over the last 3 weeks he has been less active since he had an extended drive and has been going to more medical appointments.  Over the winter, he resided in [place] and was playing pickle ball for several hours every morning.  This alleviated his symptoms of stiffness and limping.  Denies redness, swelling or warmth.  Denies any other bone or joint aching.    He has a history of left knee surgery over 30 years ago for removal of loose body.  He healed from this without complication.  He also has history of traumatic right shoulder rotator cuff tear that was repaired approximately 10 to 12 years ago at the [facility].  His only other surgery was on his right great toe for what sounds like an  exostectomy at the IP joint of the great toe.    He denies any problems with anesthesia.  No history of heart attack or stroke or blood clot he is a retired .  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       Pertinent negatives:  Patient has no history of DVT or PE. Discussed risk factors.      ALLERGIES: Patient has no known allergies.    MEDICATIONS:     Current Outpatient Medications:      atorvastatin (LIPITOR) 10 MG tablet, Take 1 tablet (10 mg) by mouth daily, Disp: 90 tablet, Rfl: 3      MEDICAL HISTORY:   Past Medical History:   Diagnosis Date     Acoustic neuroma (H) 6/14/2016    left      Heart murmur      History of vasectomy 1996     Hx of  tonsillectomy      Obesity, Class III, BMI 40-49.9 (morbid obesity) (H) 6/23/2014     Rheumatic fever          SURGICAL HISTORY:   Past Surgical History:   Procedure Laterality Date     ARTHROSCOPY KNEE RT/LT Right remote     ARTHROSCOPY SHOULDER RT/LT Right 2011    rotator cuff repair         FAMILY HISTORY:   Family History   Problem Relation Age of Onset     Hypertension Mother      Cerebrovascular Disease Mother      Alzheimer Disease Mother      Depression Mother      Hearing Loss Mother      Malignant Hypertension Mother      Migraines Mother      Cancer Maternal Uncle         stomach     Myocardial Infarction Maternal Grandfather      Alcoholism Father      Alcoholism Brother      Diabetes No family hx of      Thyroid Disease No family hx of      Glaucoma No family hx of      Macular Degeneration No family hx of          SOCIAL HISTORY:   Social History     Tobacco Use     Smoking status: Never Smoker     Smokeless tobacco: Never Used   Substance Use Topics     Alcohol use: Yes     Alcohol/week: 0.0 - 1.0 standard drinks         PHYSICAL EXAMINATION:  On physical examination the patient appears the stated age, is in no acute distress, affect is appropriate, and breathing is non-labored.  BMI: Body mass index is 35.55 kg/m .    Gait: patient walks with antalgic gait favoring the left leg.      Left Right   No deformity, skin in tact      Overall limb alignment  Varus alignment  Varus alignment   Effusion or swelling none none   Tenderness to palpation Medial jointline. Nontender to pes anserine bursa, lateral jointline or popliteal fossa.  None   ROM 0-105  0-122   Pain with knee ROM none none   Crepitance with knee ROM mild none   Extensor lag none none   MCL stability Stable Stable   Lateral Stability Stable Stable   Lachman 1A 1A   Posterior stability Stable Stable   Pain with passive full hip range of motion None. IR neutral, ER 60 degrees.     None. IR neutral, ER 60 degrees.       Prior surgical incision  Well healed anterior knee.  None.    Neurovascular exam   Sensation intact to light touch distally in all nerve distributions;     Motor intact distally TA/GSC/EHL/FHL with 5/5 strength.    DP/PT pulses 2+, BCR   Sensation intact to light touch distally in all nerve distributions;     Motor intact distally TA/GSC/EHL/FHL with 5/5 strength.    DP/PT pulses 2+, BCR           IMAGING:   X-rays left knee were obtained today and reviewed.     The 20 degree axial views demonstrate osteophyte formation of the medial and lateral patella on the left.  Grade 4 patellofemoral arthritis on the right.    The AP/lateral views demonstrate severe degenerative changes involving the medial compartment, left greater than right.  No subchondral cystic formation appreciated.  Enthesopathy of the quadriceps tendon insertion present.  Osteophyte formation again demonstrated at the superior pole of the patella on the left.    Long leg standing films demonstrate 11 degrees of varus on the right and 12 degrees of varus on the left.     ASSESSMENT: Stephane Shaikh is a 67 year old male with history of hypercholesterolemia and atraumatic left knee pain.  1.  Grade 4 osteoarthritis of the left knee primarily involving the medial compartment.  2.  Grade 3-4 osteoarthritis of the right knee primarily involving the patellofemoral and medial compartments.    PLAN:  X-rays and exam findings reviewed with the patient in detail.  We discussed initial management with exhausting conservative treatment options including continued use of topical Voltaren, icing, activity modification, weight management, over-the-counter bracing, medial off  knee bracing, physical therapy, cortisone injections and viscosupplement injection.  We discussed continuing conservative management is appropriate as long as he has good relief from injections that lasted at least 3 to 4 months, he is not having decrease in his range of motion or begins to avoid activities due to  pain as this may affect his ability to maintain his weight and to recover from surgery for total knee replacement in the future with higher success.  We also discussed if he is developing pain at midnight this is an indication to consider surgery.  Surgery would be in the form of a total knee arthroplasty on the left.  We briefly discussed risks and benefits of surgery and he will return to clinic should he wish to discuss surgical intervention further.      At this time, he is happy to try conservative management including a cortisone injection today.  All questions were answered and he was in agreement with this plan.    Procedure was done by Dr. Tariq and is separately dictated.    The patient was discussed with Dr. Tariq who also saw and evaluated the patient.     Ana Kingsley PA-C  5/10/2022 12:41 PM  Orthopaedic Surgery    I have personally examined this patient and have reviewed the clinical presentation and progress note with the PA.   I agree with the treatment plan as outlined.  The plan was formulated  on the day of dictation.    Lorena Tariq MD

## 2022-05-10 NOTE — LETTER
Date:May 11, 2022      Provider requested that no letter be sent. Do not send.       [facility]

## 2022-05-10 NOTE — PROGRESS NOTES
Large Joint Injection/Arthocentesis: L knee joint    Date/Time: 5/10/2022 1:47 PM  Performed by: Lorena Tariq MD  Authorized by: Lorena Tariq MD     Indications:  Pain and osteoarthritis  Needle Size:  21 G  Guidance: landmark guided    Approach:  Medial  Location:  Knee      Medications:  40 mg triamcinolone 40 MG/ML; 9 mL lidocaine (PF) 1 %  Outcome:  Tolerated well, no immediate complications  Procedure discussed: discussed risks, benefits, and alternatives    Consent Given by:  Patient  Timeout: timeout called immediately prior to procedure    Prep: patient was prepped and draped in usual sterile fashion     HISTORY OF PRESENT ILLNESS:  Stephane Shaikh is a 67 year old male with knee pain.  Diagnosis is knee arthritis supported by history, physical exam, and imaging.    PROCEDURE:  After informed verbal and writtten consent, under sterile conditions, patient's Left knee was injected with 9 cc of Lidocaine and 1 cc of Kenalog (40 mg/ml).   The injection was done by Dr. Tariq.    Patient had good pain relief upon leaving the clinic, and will follow up with us on an as needed basis.        [facility] [address] [id number] [phone]  Dept: [phone]  ______________________________________________________________________________    Patient: Stephane Shaikh   : 1955   MRN: [id number]   May 10, 2022    INVASIVE PROCEDURE SAFETY CHECKLIST    Date: 5/10/2022   Procedure:Left knee steroid injection  Patient Name: Stephane Shaikh  MRN: [id number]  YOB: 1955    Action: Complete sections as appropriate. Any discrepancy results in a HARD COPY until resolved.     PRE PROCEDURE:  Patient ID verified with 2 identifiers (name and  or MRN): Yes  Procedure and site verified with patient/designee (when able): Yes  Accurate consent documentation in medical record: Yes  H&P (or appropriate assessment) documented in  medical record: NA  H&P must be up to 20 days prior to procedure and updates within 24 hours of procedure as applicable: NA  Relevant diagnostic and radiology test results appropriately labeled and displayed as applicable: Yes  Procedure site(s) marked with provider initials: NA    TIMEOUT:  Time-Out performed immediately prior to starting procedure, including verbal and active participation of all team members addressing the following:Yes  * Correct patient identify  * Confirmed that the correct side and site are marked  * An accurate procedure consent form  * Agreement on the procedure to be done  * Correct patient position  * Relevant images and results are properly labeled and appropriately displayed  * The need to administer antibiotics or fluids for irrigation purposes during the procedure as applicable   * Safety precautions based on patient history or medication use    DURING PROCEDURE: Verification of correct person, site, and procedures any time the responsibility for care of the patient is transferred to another member of the care team.       The following medications were given:         Prior to injection, verified patient identity using patient's name and date of birth.  Due to injection administration, patient instructed to remain in clinic for 15 minutes  afterwards, and to report any adverse reaction to me immediately.    Joint injection was performed.    Medication Name: Lidocaine NDC 58312-451-78  Drug Amount Wasted:  Yes: 11 mg/ml   Vial/Syringe: Single dose vial  Expiration Date:  01/26    Medication Name Kenolog NDC [address]-8    Scribed by Mckayla Espinal ATC for Dr. Tariq on May 10, 2022 at 1:54pm based on the provider's statements to me.     SHERRON Terrazas MD  Professor Orthopedic Surgery  [facility]

## 2022-05-10 NOTE — LETTER
5/10/2022         RE: Stephane Shaikh  [address] [facility] [address]        Dear Colleague,    Thank you for referring your patient, Stephane Shaikh, to the [facility] [place]. Please see a copy of my visit note below.        Department of Orthopedic Surgery  Lorena Tariq MD        PATIENT NAME: Stephane Shaikh   MRN: [id number]  AGE: 67 year old  BMI: Body mass index is 35.55 kg/m .  REFERRING PHYSICIAN: No ref. provider found      CHIEF COMPLAINT: Consult ((L) knee pain)      HISTORY OF PRESENT ILLNESS:  Stephane Shaikh is a 67 year old male who presents with approximately 5-month history of onset of atraumatic left knee pain.  He has noticed that both knees have begun to look more bowed but is not causing him any issues or pain.  He states in general, pain improves with 70 but over the last 3 weeks he has been less active since he had an extended drive and has been going to more medical appointments.  Over the winter, he resided in [place] and was playing pickle ball for several hours every morning.  This alleviated his symptoms of stiffness and limping.  Denies redness, swelling or warmth.  Denies any other bone or joint aching.    He has a history of left knee surgery over 30 years ago for removal of loose body.  He healed from this without complication.  He also has history of traumatic right shoulder rotator cuff tear that was repaired approximately 10 to 12 years ago at the [facility].  His only other surgery was on his right great toe for what sounds like an  exostectomy at the IP joint of the great toe.    He denies any problems with anesthesia.  No history of heart attack or stroke or blood clot he is a retired .  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       Pertinent negatives:  Patient has no history of DVT or PE. Discussed risk factors.      ALLERGIES: Patient has no known allergies.    MEDICATIONS:     Current Outpatient Medications:       atorvastatin (LIPITOR) 10 MG tablet, Take 1 tablet (10 mg) by mouth daily, Disp: 90 tablet, Rfl: 3      MEDICAL HISTORY:   Past Medical History:   Diagnosis Date     Acoustic neuroma (H) 6/14/2016    left      Heart murmur      History of vasectomy 1996     Hx of tonsillectomy      Obesity, Class III, BMI 40-49.9 (morbid obesity) (H) 6/23/2014     Rheumatic fever          SURGICAL HISTORY:   Past Surgical History:   Procedure Laterality Date     ARTHROSCOPY KNEE RT/LT Right remote     ARTHROSCOPY SHOULDER RT/LT Right 2011    rotator cuff repair         FAMILY HISTORY:   Family History   Problem Relation Age of Onset     Hypertension Mother      Cerebrovascular Disease Mother      Alzheimer Disease Mother      Depression Mother      Hearing Loss Mother      Malignant Hypertension Mother      Migraines Mother      Cancer Maternal Uncle         stomach     Myocardial Infarction Maternal Grandfather      Alcoholism Father      Alcoholism Brother      Diabetes No family hx of      Thyroid Disease No family hx of      Glaucoma No family hx of      Macular Degeneration No family hx of          SOCIAL HISTORY:   Social History     Tobacco Use     Smoking status: Never Smoker     Smokeless tobacco: Never Used   Substance Use Topics     Alcohol use: Yes     Alcohol/week: 0.0 - 1.0 standard drinks         PHYSICAL EXAMINATION:  On physical examination the patient appears the stated age, is in no acute distress, affect is appropriate, and breathing is non-labored.  BMI: Body mass index is 35.55 kg/m .    Gait: patient walks with antalgic gait favoring the left leg.      Left Right   No deformity, skin in tact      Overall limb alignment  Varus alignment  Varus alignment   Effusion or swelling none none   Tenderness to palpation Medial jointline. Nontender to pes anserine bursa, lateral jointline or popliteal fossa.  None   ROM 0-105  0-122   Pain with knee ROM none none   Crepitance with knee ROM mild none   Extensor lag none  none   MCL stability Stable Stable   Lateral Stability Stable Stable   Lachman 1A 1A   Posterior stability Stable Stable   Pain with passive full hip range of motion None. IR neutral, ER 60 degrees.     None. IR neutral, ER 60 degrees.       Prior surgical incision Well healed anterior knee.  None.    Neurovascular exam   Sensation intact to light touch distally in all nerve distributions;     Motor intact distally TA/GSC/EHL/FHL with 5/5 strength.    DP/PT pulses 2+, BCR   Sensation intact to light touch distally in all nerve distributions;     Motor intact distally TA/GSC/EHL/FHL with 5/5 strength.    DP/PT pulses 2+, BCR           IMAGING:   X-rays left knee were obtained today and reviewed.     The 20 degree axial views demonstrate osteophyte formation of the medial and lateral patella on the left.  Grade 4 patellofemoral arthritis on the right.    The AP/lateral views demonstrate severe degenerative changes involving the medial compartment, left greater than right.  No subchondral cystic formation appreciated.  Enthesopathy of the quadriceps tendon insertion present.  Osteophyte formation again demonstrated at the superior pole of the patella on the left.    Long leg standing films demonstrate 11 degrees of varus on the right and 12 degrees of varus on the left.     ASSESSMENT: Stephane Shaikh is a 67 year old male with history of hypercholesterolemia and atraumatic left knee pain.  1.  Grade 4 osteoarthritis of the left knee primarily involving the medial compartment.  2.  Grade 3-4 osteoarthritis of the right knee primarily involving the patellofemoral and medial compartments.    PLAN:  X-rays and exam findings reviewed with the patient in detail.  We discussed initial management with exhausting conservative treatment options including continued use of topical Voltaren, icing, activity modification, weight management, over-the-counter bracing, medial off  knee bracing, physical therapy, cortisone  injections and viscosupplement injection.  We discussed continuing conservative management is appropriate as long as he has good relief from injections that lasted at least 3 to 4 months, he is not having decrease in his range of motion or begins to avoid activities due to pain as this may affect his ability to maintain his weight and to recover from surgery for total knee replacement in the future with higher success.  We also discussed if he is developing pain at midnight this is an indication to consider surgery.  Surgery would be in the form of a total knee arthroplasty on the left.  We briefly discussed risks and benefits of surgery and he will return to clinic should he wish to discuss surgical intervention further.      At this time, he is happy to try conservative management including a cortisone injection today.  All questions were answered and he was in agreement with this plan.    Procedure was done by Dr. Tariq and is separately dictated.    The patient was discussed with Dr. Tariq who also saw and evaluated the patient.     Ana Kingsley PA-C  5/10/2022 12:41 PM  Orthopaedic Surgery    I have personally examined this patient and have reviewed the clinical presentation and progress note with the PA.   I agree with the treatment plan as outlined.  The plan was formulated  on the day of dictation.    Lorena Tariq MD        Large Joint Injection/Arthocentesis: L knee joint    Date/Time: 5/10/2022 1:47 PM  Performed by: Lorena Tariq MD  Authorized by: Lornea Tariq MD     Indications:  Pain and osteoarthritis  Needle Size:  21 G  Guidance: landmark guided    Approach:  Medial  Location:  Knee      Medications:  40 mg triamcinolone 40 MG/ML; 9 mL lidocaine (PF) 1 %  Outcome:  Tolerated well, no immediate complications  Procedure discussed: discussed risks, benefits, and alternatives    Consent Given by:  Patient  Timeout: timeout called immediately prior to procedure    Prep:  patient was prepped and draped in usual sterile fashion     HISTORY OF PRESENT ILLNESS:  Stephane Shaikh is a 67 year old male with knee pain.  Diagnosis is knee arthritis supported by history, physical exam, and imaging.    PROCEDURE:  After informed verbal and writtten consent, under sterile conditions, patient's Left knee was injected with 9 cc of Lidocaine and 1 cc of Kenalog (40 mg/ml).   The injection was done by Dr. Tariq.    Patient had good pain relief upon leaving the clinic, and will follow up with us on an as needed basis.        [facility] [address] [id number] [phone]  Dept: [phone]  ______________________________________________________________________________    Patient: Stephane Shaikh   : 1955   MRN: [id number]   May 10, 2022    INVASIVE PROCEDURE SAFETY CHECKLIST    Date: 5/10/2022   Procedure:Left knee steroid injection  Patient Name: Stephane Shaikh  MRN: [id number]  YOB: 1955    Action: Complete sections as appropriate. Any discrepancy results in a HARD COPY until resolved.     PRE PROCEDURE:  Patient ID verified with 2 identifiers (name and  or MRN): Yes  Procedure and site verified with patient/designee (when able): Yes  Accurate consent documentation in medical record: Yes  H&P (or appropriate assessment) documented in medical record: NA  H&P must be up to 20 days prior to procedure and updates within 24 hours of procedure as applicable: NA  Relevant diagnostic and radiology test results appropriately labeled and displayed as applicable: Yes  Procedure site(s) marked with provider initials: NA    TIMEOUT:  Time-Out performed immediately prior to starting procedure, including verbal and active participation of all team members addressing the following:Yes  * Correct patient identify  * Confirmed that the correct side and site are marked  * An accurate procedure consent form  * Agreement  on the procedure to be done  * Correct patient position  * Relevant images and results are properly labeled and appropriately displayed  * The need to administer antibiotics or fluids for irrigation purposes during the procedure as applicable   * Safety precautions based on patient history or medication use    DURING PROCEDURE: Verification of correct person, site, and procedures any time the responsibility for care of the patient is transferred to another member of the care team.       The following medications were given:         Prior to injection, verified patient identity using patient's name and date of birth.  Due to injection administration, patient instructed to remain in clinic for 15 minutes  afterwards, and to report any adverse reaction to me immediately.    Joint injection was performed.    Medication Name: Lidocaine NDC 13162-274-62  Drug Amount Wasted:  Yes: 11 mg/ml   Vial/Syringe: Single dose vial  Expiration Date:  01/26    Medication Name Kenolog NDC [address]-8    Scribed by Mckayla Espinal ATC for Dr. Tariq on May 10, 2022 at 1:54pm based on the provider's statements to me.     SHERRON Terrazas MD  Professor Orthopedic Surgery  [facility]            Again, thank you for allowing me to participate in the care of your patient.        Sincerely,        Lorena Tariq MD

## 2022-06-14 DIAGNOSIS — E78.5 HYPERLIPIDEMIA LDL GOAL <100: ICD-10-CM

## 2022-06-14 RX ORDER — ATORVASTATIN CALCIUM 10 MG/1
10 TABLET, FILM COATED ORAL DAILY
Qty: 90 TABLET | Refills: 3 | Status: SHIPPED | OUTPATIENT
Start: 2022-06-14 | End: 2023-06-13

## 2022-06-14 NOTE — TELEPHONE ENCOUNTER
lipitor      Last Written Prescription Date:  6/15/21  Last Fill Quantity: 90,   # refills: 3  Last Office Visit: 6/15/21  Future Office visit:

## 2022-10-04 ENCOUNTER — OFFICE VISIT (OUTPATIENT)
Dept: ORTHOPEDICS | Facility: CLINIC | Age: 67
End: 2022-10-04
Payer: COMMERCIAL

## 2022-10-04 VITALS — BODY MASS INDEX: 37.08 KG/M2 | HEIGHT: 70 IN | WEIGHT: 259 LBS

## 2022-10-04 DIAGNOSIS — M17.11 ARTHRITIS OF RIGHT KNEE: Primary | ICD-10-CM

## 2022-10-04 DIAGNOSIS — M17.12 ARTHRITIS OF LEFT KNEE: ICD-10-CM

## 2022-10-04 PROCEDURE — 99212 OFFICE O/P EST SF 10 MIN: CPT | Mod: 25 | Performed by: ORTHOPAEDIC SURGERY

## 2022-10-04 PROCEDURE — 20610 DRAIN/INJ JOINT/BURSA W/O US: CPT | Mod: 50 | Performed by: ORTHOPAEDIC SURGERY

## 2022-10-04 RX ORDER — LIDOCAINE HYDROCHLORIDE 10 MG/ML
9 INJECTION, SOLUTION EPIDURAL; INFILTRATION; INTRACAUDAL; PERINEURAL
Status: DISCONTINUED | OUTPATIENT
Start: 2022-10-04 | End: 2023-05-11

## 2022-10-04 RX ORDER — TRIAMCINOLONE ACETONIDE 40 MG/ML
40 INJECTION, SUSPENSION INTRA-ARTICULAR; INTRAMUSCULAR
Status: DISCONTINUED | OUTPATIENT
Start: 2022-10-04 | End: 2023-05-11

## 2022-10-04 RX ADMIN — TRIAMCINOLONE ACETONIDE 40 MG: 40 INJECTION, SUSPENSION INTRA-ARTICULAR; INTRAMUSCULAR at 10:17

## 2022-10-04 RX ADMIN — LIDOCAINE HYDROCHLORIDE 9 ML: 10 INJECTION, SOLUTION EPIDURAL; INFILTRATION; INTRACAUDAL; PERINEURAL at 10:17

## 2022-10-04 NOTE — PROGRESS NOTES
Patient is a 67-year-old male who has complaints of bilateral knee pain.  He was previously followed for left knee pain.  He received a steroid injection 5/10/2022 and felt that it provided him quite good relief until the last few weeks.    He is retired but does play a lot of pickleball.  He was able to enjoy pickleball most of the summer.    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  He is now having problems on both sides and wondered if we could have bilateral knee injections.    We talked about when would it be time for him to proceed with a total joint replacement.  Seeing as he continues to have good motion and the injections are lasting close to 6 months, I think it is reasonable To continue nonoperative management.    He does know that he could lose weight.  His current weight is 259.  His highest was 309 in 2014 but he was able to lose more than 70 pounds and was down to 230 approximately 2021.  He did this with a certain type of diet that he feels that he could go on again.    Imaging taken today reveals bilateral varus deformities 12 degrees right and 11 degrees left mechanical axis.    Range of motion of left knee  right knee 0-126.    Assessment: I agree with bilateral injections today.  If they continue to work we could repeat them in the spring.  If they do not give him similar results and he would like to proceed with a total joint on 1 side, he will contact us through [employer].    Injections are separately dictated.    Lorena Tariq MD  Professor Orthopedic Surgery  [facility]

## 2022-10-04 NOTE — PROGRESS NOTES
Large Joint Injection/Arthocentesis: bilateral knee    Date/Time: 10/4/2022 10:17 AM  Performed by: Lorena Tariq MD  Authorized by: Lorena Tariq MD     Indications:  Pain and osteoarthritis  Needle Size:  21 G  Guidance: landmark guided    Approach:  Medial  Location:  Knee  Laterality:  Bilateral      Medications (Right):  40 mg triamcinolone 40 MG/ML; 9 mL lidocaine (PF) 1 %  Medications (Left):  40 mg triamcinolone 40 MG/ML; 9 mL lidocaine (PF) 1 %  Outcome:  Tolerated well, no immediate complications  Procedure discussed: discussed risks, benefits, and alternatives    Consent Given by:  Patient  Timeout: timeout called immediately prior to procedure    Prep: patient was prepped and draped in usual sterile fashion     HISTORY OF PRESENT ILLNESS:  Stephane Shaikh is a 67 year old male with knee pain.  Diagnosis is knee arthritis supported by history, physical exam, and imaging.    PROCEDURE:  After informed verbal and writtten consent, under sterile conditions, patient's bilateral knees were injected with 9 cc of Lidocaine and 1 cc of Kenalog (40 mg/ml).   The injection was done by Dr. Tariq.    Patient had good pain relief upon leaving the clinic, and will follow up with us on an as needed basis.        [facility] [address]  [address] [facility] [id number] [phone]  Dept: [phone]  ______________________________________________________________________________    Patient: Stephane Shaikh   : 1955   MRN: [id number]   2022    INVASIVE PROCEDURE SAFETY CHECKLIST    Date: 10/4/2022   Procedure:Bilateral knees steroid injection  Patient Name: Stephane Shaikh  MRN: [id number]  YOB: 1955    Action: Complete sections as appropriate. Any discrepancy results in a HARD COPY until resolved.     PRE PROCEDURE:  Patient ID verified with 2 identifiers (name and  or MRN): Yes  Procedure and site verified with  patient/designee (when able): Yes  Accurate consent documentation in medical record: Yes  H&P (or appropriate assessment) documented in medical record: NA  H&P must be up to 20 days prior to procedure and updates within 24 hours of procedure as applicable: NA  Relevant diagnostic and radiology test results appropriately labeled and displayed as applicable: Yes  Procedure site(s) marked with provider initials: NA    TIMEOUT:  Time-Out performed immediately prior to starting procedure, including verbal and active participation of all team members addressing the following:Yes  * Correct patient identify  * Confirmed that the correct side and site are marked  * An accurate procedure consent form  * Agreement on the procedure to be done  * Correct patient position  * Relevant images and results are properly labeled and appropriately displayed  * The need to administer antibiotics or fluids for irrigation purposes during the procedure as applicable   * Safety precautions based on patient history or medication use    DURING PROCEDURE: Verification of correct person, site, and procedures any time the responsibility for care of the patient is transferred to another member of the care team.       The following medications were given:         Prior to injection, verified patient identity using patient's name and date of birth.  Due to injection administration, patient instructed to remain in clinic for 15 minutes  afterwards, and to report any adverse reaction to me immediately.    Joint injection was performed.    Medication Name: Lidcocaine NDC 67715-442-53 for both knees  Drug Amount Wasted:  Yes: 2 mg/ml   Vial/Syringe: Single dose vial  Expiration Date:  01/26    Medication Name Kenolog NDC [address]-2 for both knees    Scribed by Mckayla Espinal ATC for Dr. Tariq on October 4, 2022 at 10:34p based on the provider's statements to me.     SHERRON Terrazas MD  Professor Orthopedic  Surgery  [facility]

## 2022-10-04 NOTE — LETTER
Date:October 5, 2022      Patient was self referred, no letter generated. Do not send.        [facility] Information

## 2022-10-04 NOTE — NURSING NOTE
"Reason For Visit:   Chief Complaint   Patient presents with     RECHECK     follow up (L) knee pain wants injections bilaterally last 5/10/22       Primary MD: No primary care provider on file.  Ref. MD: Est    ?  No  Occupation retired.     Date of injury: No  Type of injury: No.     Date of surgery: 30 years ago  Type of surgery: scope L knee     Smoker: No  Request smoking cessation information: No    Ht 1.79 m (5' 10.47\")   Wt 117.5 kg (259 lb)   BMI 36.67 kg/m      Pain Assessment  Patient Currently in Pain: Yes  0-10 Pain Scale: 1  Primary Pain Location: Knee             Susan Formato, LPN  "

## 2022-10-04 NOTE — LETTER
10/4/2022         RE: Stephane Shaikh  [address] [facility] [address]        Dear Colleague,    Thank you for referring your patient, Stephane Shaikh, to the [facility]. Please see a copy of my visit note below.    Large Joint Injection/Arthocentesis: bilateral knee    Date/Time: 10/4/2022 10:17 AM  Performed by: Lorena Tariq MD  Authorized by: Lorena Tariq MD     Indications:  Pain and osteoarthritis  Needle Size:  21 G  Guidance: landmark guided    Approach:  Medial  Location:  Knee  Laterality:  Bilateral      Medications (Right):  40 mg triamcinolone 40 MG/ML; 9 mL lidocaine (PF) 1 %  Medications (Left):  40 mg triamcinolone 40 MG/ML; 9 mL lidocaine (PF) 1 %  Outcome:  Tolerated well, no immediate complications  Procedure discussed: discussed risks, benefits, and alternatives    Consent Given by:  Patient  Timeout: timeout called immediately prior to procedure    Prep: patient was prepped and draped in usual sterile fashion     HISTORY OF PRESENT ILLNESS:  Stephane Shaikh is a 67 year old male with knee pain.  Diagnosis is knee arthritis supported by history, physical exam, and imaging.    PROCEDURE:  After informed verbal and writtten consent, under sterile conditions, patient's bilateral knees were injected with 9 cc of Lidocaine and 1 cc of Kenalog (40 mg/ml).   The injection was done by Dr. Tariq.    Patient had good pain relief upon leaving the clinic, and will follow up with us on an as needed basis.        [facility] [address]  [address] [facility] [id number] [phone]  Dept: [phone]  ______________________________________________________________________________    Patient: Stephane Shaikh   : 1955   MRN: [id number]   2022    INVASIVE PROCEDURE SAFETY CHECKLIST    Date: 10/4/2022   Procedure:Bilateral knees steroid injection  Patient Name: Stephane Shaikh  MRN:  [id number]  YOB: 1955    Action: Complete sections as appropriate. Any discrepancy results in a HARD COPY until resolved.     PRE PROCEDURE:  Patient ID verified with 2 identifiers (name and  or MRN): Yes  Procedure and site verified with patient/designee (when able): Yes  Accurate consent documentation in medical record: Yes  H&P (or appropriate assessment) documented in medical record: NA  H&P must be up to 20 days prior to procedure and updates within 24 hours of procedure as applicable: NA  Relevant diagnostic and radiology test results appropriately labeled and displayed as applicable: Yes  Procedure site(s) marked with provider initials: NA    TIMEOUT:  Time-Out performed immediately prior to starting procedure, including verbal and active participation of all team members addressing the following:Yes  * Correct patient identify  * Confirmed that the correct side and site are marked  * An accurate procedure consent form  * Agreement on the procedure to be done  * Correct patient position  * Relevant images and results are properly labeled and appropriately displayed  * The need to administer antibiotics or fluids for irrigation purposes during the procedure as applicable   * Safety precautions based on patient history or medication use    DURING PROCEDURE: Verification of correct person, site, and procedures any time the responsibility for care of the patient is transferred to another member of the care team.       The following medications were given:         Prior to injection, verified patient identity using patient's name and date of birth.  Due to injection administration, patient instructed to remain in clinic for 15 minutes  afterwards, and to report any adverse reaction to me immediately.    Joint injection was performed.    Medication Name: Lidcocaine NDC 18443-189-74 for both knees  Drug Amount Wasted:  Yes: 2 mg/ml   Vial/Syringe: Single dose vial  Expiration Date:   01/26    Medication Name Mayelin NDC [address]-2 for both knees    Scribed by Mckayla Espinal ATC for Dr. Tariq on October 4, 2022 at 10:34p based on the provider's statements to me.     SHERRON Terrazas MD  Professor Orthopedic Surgery  [facility]        Patient is a 67-year-old male who has complaints of bilateral knee pain.  He was previously followed for left knee pain.  He received a steroid injection 5/10/2022 and felt that it provided him quite good relief until the last few weeks.    He is retired but does play a lot of pickleball.  He was able to enjoy pickleball most of the summer.    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  He is now having problems on both sides and wondered if we could have bilateral knee injections.    We talked about when would it be time for him to proceed with a total joint replacement.  Seeing as he continues to have good motion and the injections are lasting close to 6 months, I think it is reasonable To continue nonoperative management.    He does know that he could lose weight.  His current weight is 259.  His highest was 309 in 2014 but he was able to lose more than 70 pounds and was down to 230 approximately 2021.  He did this with a certain type of diet that he feels that he could go on again.    Imaging taken today reveals bilateral varus deformities 12 degrees right and 11 degrees left mechanical axis.    Range of motion of left knee  right knee 0-126.    Assessment: I agree with bilateral injections today.  If they continue to work we could repeat them in the spring.  If they do not give him similar results and he would like to proceed with a total joint on 1 side, he will contact us through [employer].    Injections are separately dictated.    Lorena Tariq MD  Professor Orthopedic Surgery  [facility]      Again, thank you for allowing me to participate in the care of your  patient.        Sincerely,        Lorena Tariq MD

## 2023-03-07 DIAGNOSIS — M17.11 ARTHRITIS OF RIGHT KNEE: Primary | ICD-10-CM

## 2023-03-08 ENCOUNTER — TELEPHONE (OUTPATIENT)
Dept: ORTHOPEDICS | Facility: CLINIC | Age: 68
End: 2023-03-08
Payer: COMMERCIAL

## 2023-03-08 NOTE — TELEPHONE ENCOUNTER
Phoned patient to get him scheduled for surgery with Dr. Tariq    Patient was unavailable,   Provided call back number in voicemail:   [phone] and [phone].

## 2023-03-08 NOTE — TELEPHONE ENCOUNTER
Patient is scheduled for surgery with Dr. Tariq    Spoke with: Sam    Date of Surgery: 5/11/23    Location: [facility] OR    Informed patient they will need an adult  Yes    H&P: Scheduled with PCP    Pre-procedure COVID-19 Test: N/A    Additional imaging/appointments: POP Made    Surgery packet: Mailed    Additional comments: RTN visit w/Dr. Tariq scheduled for 5/2/23.

## 2023-03-28 ENCOUNTER — TELEPHONE (OUTPATIENT)
Dept: ORTHOPEDICS | Facility: CLINIC | Age: 68
End: 2023-03-28
Payer: COMMERCIAL

## 2023-03-28 DIAGNOSIS — M17.11 ARTHRITIS OF RIGHT KNEE: Primary | ICD-10-CM

## 2023-03-28 NOTE — TELEPHONE ENCOUNTER
Attempted to phone patient to review preop teaching for upcoming total knee replacement on 5/11 with Dr. Tariq.  No answer during call, writer left  with call back information.    Post op PT order entered.    Gerda Haq RN on 3/28/2023 at 9:53 AM

## 2023-03-29 ENCOUNTER — TELEPHONE (OUTPATIENT)
Dept: ORTHOPEDICS | Facility: CLINIC | Age: 68
End: 2023-03-29
Payer: COMMERCIAL

## 2023-03-29 NOTE — TELEPHONE ENCOUNTER
Teaching Flowsheet   Relevant Diagnosis: right knee osteoarthritis  Teaching Topic: right total knee arthroplasty     Patient  is spouse. Patient will do PT at [facility]. Writer faxed PT orders.    Discussed total joint online class, offered to help schedule for patient.  Patient will call if they require help for signing up for the total joint zoom class. Patient understands they will need a preop exam within 30 days of date of surgery. Patient understands the expected length of stay and the expectation of outpatient physical therapy. Patient understands the need for an at home  after surgery and need for transportation home.  Discussed dental/non-sterile procedure prophylaxis. Discussed the [facility] Care Companion service. Discussed stoplight tool and how to use it with patient.      Person(s) involved in teaching:   Patient     Motivation Level:  Asks Questions: Yes  Eager to Learn: Yes  Cooperative: Yes  Receptive (willing/able to accept information): Yes  Any cultural factors/[employer] beliefs that may influence understanding or compliance? No  Comments: none     Patient demonstrates understanding of the following:  Reason for the appointment, diagnosis and treatment plan: Yes  Knowledge of proper use of medications and conditions for which they are ordered (with special attention to potential side effects or drug interactions): Yes  Which situations necessitate calling provider and whom to contact: Yes    Teaching Concerns Addressed:   Comments: none     Proper use and care of (medical equip, care aids, etc.): Yes  Nutritional needs and diet plan: Yes  Pain management techniques: Yes  Wound Care: Yes  How and/when to access community resources: Yes     Instructional Materials Used/Given: Preoperative teaching packet, surgical soap x2, dental card, total joint booklet, welcome letter, stoplight tool.       Time spent with patient: 30 minutes.    Gerda Haq RN on 3/29/2023 at 3:02  PM

## 2023-03-29 NOTE — TELEPHONE ENCOUNTER
ATC returned phone call to [facility] to fax PT orders.  Phone went to [facility] 5 times, no fax number was available on VM message nor on [facility]'s website.  Left our call back number requesting someone call with a fax number to fax PT orders to.    AVI HERNANDEZ, SHERRON

## 2023-03-29 NOTE — TELEPHONE ENCOUNTER
[facility] Call Center    Phone Message:  Pt missed a call from Gerda.  Pt would like a CALL BACK from Gerda. Please CALL pt. Thank you.    Reason for Call: Other: Returned Call, CALL BACK     Action Taken: Message routed to:  [facility] ([facility] Team to Gerda    Travel Screening: Not Applicable

## 2023-03-29 NOTE — TELEPHONE ENCOUNTER
Health Call Center    Phone Message    May a detailed message be left on voicemail: yes     Reason for Call: Order(s): Other:   Reason for requested: Phy therapy order -     Date needed: 03/29/2023    Provider name: Dr. Lorena Tariq     Pt asking for order to go [facility]  / Ph#  [phone]    Ph#  for pt - [phone] if there is any questions.       Action Taken: Other: [facility]MP:  Dr. Lorena Tariq    Travel Screening: Not Applicable

## 2023-04-04 ENCOUNTER — MEDICAL CORRESPONDENCE (OUTPATIENT)
Dept: HEALTH INFORMATION MANAGEMENT | Facility: CLINIC | Age: 68
End: 2023-04-04
Payer: COMMERCIAL

## 2023-05-02 ENCOUNTER — OFFICE VISIT (OUTPATIENT)
Dept: ORTHOPEDICS | Facility: CLINIC | Age: 68
End: 2023-05-02
Payer: COMMERCIAL

## 2023-05-02 ENCOUNTER — ANCILLARY PROCEDURE (OUTPATIENT)
Dept: GENERAL RADIOLOGY | Facility: CLINIC | Age: 68
End: 2023-05-02
Attending: PHYSICIAN ASSISTANT
Payer: COMMERCIAL

## 2023-05-02 ENCOUNTER — OFFICE VISIT (OUTPATIENT)
Dept: FAMILY MEDICINE | Facility: OTHER | Age: 68
End: 2023-05-02
Attending: FAMILY MEDICINE
Payer: MEDICARE

## 2023-05-02 VITALS
WEIGHT: 251.9 LBS | HEART RATE: 66 BPM | RESPIRATION RATE: 18 BRPM | HEIGHT: 71 IN | BODY MASS INDEX: 35.27 KG/M2 | SYSTOLIC BLOOD PRESSURE: 136 MMHG | OXYGEN SATURATION: 96 % | DIASTOLIC BLOOD PRESSURE: 82 MMHG | TEMPERATURE: 96.7 F

## 2023-05-02 VITALS — BODY MASS INDEX: 35.14 KG/M2 | HEIGHT: 71 IN | WEIGHT: 251 LBS

## 2023-05-02 DIAGNOSIS — G89.29 CHRONIC PAIN OF RIGHT KNEE: ICD-10-CM

## 2023-05-02 DIAGNOSIS — G89.29 CHRONIC PAIN OF RIGHT KNEE: Primary | ICD-10-CM

## 2023-05-02 DIAGNOSIS — M25.561 CHRONIC PAIN OF RIGHT KNEE: ICD-10-CM

## 2023-05-02 DIAGNOSIS — M17.11 ARTHRITIS OF RIGHT KNEE: ICD-10-CM

## 2023-05-02 DIAGNOSIS — I10 BENIGN ESSENTIAL HYPERTENSION: ICD-10-CM

## 2023-05-02 DIAGNOSIS — R73.9 ELEVATED BLOOD SUGAR: ICD-10-CM

## 2023-05-02 DIAGNOSIS — Z01.818 PREOPERATIVE EXAMINATION: Primary | ICD-10-CM

## 2023-05-02 DIAGNOSIS — M17.0 PRIMARY OSTEOARTHRITIS OF BOTH KNEES: ICD-10-CM

## 2023-05-02 DIAGNOSIS — E66.812 OBESITY, CLASS II, BMI 35-39.9: ICD-10-CM

## 2023-05-02 DIAGNOSIS — Z23 ENCOUNTER FOR VACCINATION: ICD-10-CM

## 2023-05-02 DIAGNOSIS — E78.5 DYSLIPIDEMIA: ICD-10-CM

## 2023-05-02 DIAGNOSIS — M25.561 CHRONIC PAIN OF RIGHT KNEE: Primary | ICD-10-CM

## 2023-05-02 LAB
ALBUMIN SERPL BCG-MCNC: 4.4 G/DL (ref 3.5–5.2)
ALP SERPL-CCNC: 84 U/L (ref 40–129)
ALT SERPL W P-5'-P-CCNC: 22 U/L (ref 10–50)
ANION GAP SERPL CALCULATED.3IONS-SCNC: 9 MMOL/L (ref 7–15)
AST SERPL W P-5'-P-CCNC: 29 U/L (ref 10–50)
BASOPHILS # BLD AUTO: 0.1 10E3/UL (ref 0–0.2)
BASOPHILS NFR BLD AUTO: 1 %
BILIRUB SERPL-MCNC: 1 MG/DL
BUN SERPL-MCNC: 12.3 MG/DL (ref 8–23)
CALCIUM SERPL-MCNC: 9.8 MG/DL (ref 8.8–10.2)
CHLORIDE SERPL-SCNC: 103 MMOL/L (ref 98–107)
CHOLEST SERPL-MCNC: 157 MG/DL
CREAT SERPL-MCNC: 1.06 MG/DL (ref 0.67–1.17)
CREAT UR-MCNC: 331.5 MG/DL
DEPRECATED HCO3 PLAS-SCNC: 28 MMOL/L (ref 22–29)
EOSINOPHIL # BLD AUTO: 0.2 10E3/UL (ref 0–0.7)
EOSINOPHIL NFR BLD AUTO: 2 %
ERYTHROCYTE [DISTWIDTH] IN BLOOD BY AUTOMATED COUNT: 12.2 % (ref 10–15)
GFR SERPL CREATININE-BSD FRML MDRD: 77 ML/MIN/1.73M2
GLUCOSE SERPL-MCNC: 114 MG/DL (ref 70–99)
HCT VFR BLD AUTO: 43.3 % (ref 40–53)
HDLC SERPL-MCNC: 54 MG/DL
HGB BLD-MCNC: 14.5 G/DL (ref 13.3–17.7)
IMM GRANULOCYTES # BLD: 0 10E3/UL
IMM GRANULOCYTES NFR BLD: 0 %
LDLC SERPL CALC-MCNC: 81 MG/DL
LYMPHOCYTES # BLD AUTO: 2.8 10E3/UL (ref 0.8–5.3)
LYMPHOCYTES NFR BLD AUTO: 39 %
MCH RBC QN AUTO: 30.5 PG (ref 26.5–33)
MCHC RBC AUTO-ENTMCNC: 33.5 G/DL (ref 31.5–36.5)
MCV RBC AUTO: 91 FL (ref 78–100)
MICROALBUMIN UR-MCNC: 31.2 MG/L
MICROALBUMIN/CREAT UR: 9.41 MG/G CR (ref 0–17)
MONOCYTES # BLD AUTO: 0.6 10E3/UL (ref 0–1.3)
MONOCYTES NFR BLD AUTO: 9 %
NEUTROPHILS # BLD AUTO: 3.4 10E3/UL (ref 1.6–8.3)
NEUTROPHILS NFR BLD AUTO: 49 %
NONHDLC SERPL-MCNC: 103 MG/DL
NRBC # BLD AUTO: 0 10E3/UL
NRBC BLD AUTO-RTO: 0 /100
PLATELET # BLD AUTO: 236 10E3/UL (ref 150–450)
POTASSIUM SERPL-SCNC: 4 MMOL/L (ref 3.4–5.3)
PROT SERPL-MCNC: 7.8 G/DL (ref 6.4–8.3)
RBC # BLD AUTO: 4.76 10E6/UL (ref 4.4–5.9)
SODIUM SERPL-SCNC: 140 MMOL/L (ref 136–145)
TRIGL SERPL-MCNC: 108 MG/DL
TSH SERPL DL<=0.005 MIU/L-ACNC: 2.56 UIU/ML (ref 0.3–4.2)
WBC # BLD AUTO: 7 10E3/UL (ref 4–11)

## 2023-05-02 PROCEDURE — 80053 COMPREHEN METABOLIC PANEL: CPT | Mod: ZL | Performed by: FAMILY MEDICINE

## 2023-05-02 PROCEDURE — 73560 X-RAY EXAM OF KNEE 1 OR 2: CPT | Mod: RT | Performed by: RADIOLOGY

## 2023-05-02 PROCEDURE — 82570 ASSAY OF URINE CREATININE: CPT | Mod: ZL | Performed by: FAMILY MEDICINE

## 2023-05-02 PROCEDURE — 93005 ELECTROCARDIOGRAM TRACING: CPT | Performed by: FAMILY MEDICINE

## 2023-05-02 PROCEDURE — 90677 PCV20 VACCINE IM: CPT

## 2023-05-02 PROCEDURE — 93010 ELECTROCARDIOGRAM REPORT: CPT | Mod: 77 | Performed by: INTERNAL MEDICINE

## 2023-05-02 PROCEDURE — 36415 COLL VENOUS BLD VENIPUNCTURE: CPT | Mod: ZL | Performed by: FAMILY MEDICINE

## 2023-05-02 PROCEDURE — 99214 OFFICE O/P EST MOD 30 MIN: CPT | Performed by: FAMILY MEDICINE

## 2023-05-02 PROCEDURE — 85025 COMPLETE CBC W/AUTO DIFF WBC: CPT | Mod: ZL | Performed by: FAMILY MEDICINE

## 2023-05-02 PROCEDURE — 84443 ASSAY THYROID STIM HORMONE: CPT | Mod: ZL | Performed by: FAMILY MEDICINE

## 2023-05-02 PROCEDURE — 80061 LIPID PANEL: CPT | Mod: ZL | Performed by: FAMILY MEDICINE

## 2023-05-02 PROCEDURE — 99214 OFFICE O/P EST MOD 30 MIN: CPT | Performed by: PHYSICIAN ASSISTANT

## 2023-05-02 PROCEDURE — G0463 HOSPITAL OUTPT CLINIC VISIT: HCPCS | Mod: 25

## 2023-05-02 ASSESSMENT — ENCOUNTER SYMPTOMS
ABDOMINAL PAIN: 0
SHORTNESS OF BREATH: 0
FEVER: 0
COUGH: 0
LIGHT-HEADEDNESS: 0
CHEST TIGHTNESS: 0
WHEEZING: 0
HEADACHES: 0
PALPITATIONS: 0

## 2023-05-02 ASSESSMENT — KOOS JR
STRAIGHTENING KNEE FULLY: MILD
HOW SEVERE IS YOUR KNEE STIFFNESS AFTER FIRST WAKING IN MORNING: MODERATE
KOOS JR SCORING: 54.84
STANDING UPRIGHT: MODERATE
RISING FROM SITTING: MODERATE
GOING UP OR DOWN STAIRS: MODERATE
BENDING TO THE FLOOR TO PICK UP OBJECT: SEVERE
TWISING OR PIVOTING ON KNEE: MILD

## 2023-05-02 ASSESSMENT — PAIN SCALES - GENERAL: PAINLEVEL: NO PAIN (1)

## 2023-05-02 NOTE — NURSING NOTE
"Reason For Visit:   Chief Complaint   Patient presents with     RECHECK     Right knee       Primary MD: No Ref-Primary, Physician  Referring MD: Est    ?  No  Occupation retired.     Date of injury: No  Type of injury: No.     Date of surgery: 30 years ago  Type of surgery: scope L knee     Smoker: No  Request smoking cessation information: No     Ht 1.79 m (5' 10.47\")   Wt 117.5 kg (259 lb)   BMI 36.67 kg/m       Pain Assessment  Patient Currently in Pain: Yes  0-10 Pain Scale: 1  Primary Pain Location: Knee    Ht 1.791 m (5' 10.5\")   Wt 113.9 kg (251 lb)   BMI 35.51 kg/m             "

## 2023-05-02 NOTE — PROGRESS NOTES
[facility] [place]  [address]  [place] [address]  Phone: [phone]  Primary Provider: No Ref-Primary, Physician  Pre-op Performing Provider: MENDEZ DE LEON      PREOPERATIVE EVALUATION:  Today's date: 5/2/2023    Stephane Shaikh is a 68 year old male who presents for a preoperative evaluation.      5/2/2023     9:15 AM   Additional Questions   Roomed by Ludwig Lama LPN   Accompanied by Self         5/2/2023     9:15 AM   Patient Reported Additional Medications   Patient reports taking the following new medications None     Surgical Information:  Surgery/Procedure: TKA Right   Surgery Location: [facility]  Surgeon: Lorena Tariq MD  Surgery Date: 5/11/2023  Time of Surgery: 1000 am  Where patient plans to recover: At home with family  Fax number for surgical facility: Note does not need to be faxed, will be available electronically in Epic.    Assessment & Plan     The proposed surgical procedure is considered INTERMEDIATE risk.    Preoperative examination  - EKG 12-lead complete w/read - Clinics  - Comprehensive metabolic panel  - CBC with Platelets & Differential    Primary osteoarthritis of both knees    Benign essential hypertension  - TSH with free T4 reflex  - Albumin Random Urine Quantitative with Creat Ratio  - Comprehensive metabolic panel  - CBC with Platelets & Differential    Dyslipidemia  - Lipid Profile    Obesity, Class II, BMI 35-39.9    Encounter for vaccination  - PNEUMOCOCCAL 20 VALENT CONJUGATE (PREVNAR 20)            - No identified additional risk factors other than previously addressed    Antiplatelet or Anticoagulation Medication Instructions:   - Patient is on no antiplatelet or anticoagulation medications.    Additional Medication Instructions:  Patient is to take all scheduled medications on the day of surgery    RECOMMENDATION:  APPROVAL GIVEN to proceed with proposed procedure, without further diagnostic evaluation.        Subjective     HPI  related to upcoming procedure: preop right knee replacement  Patient originally scheduled as establish care visit with another visit coming up on Friday for a preop.  We'll do both today, and then see him back for an annual physical/preventative care visit later this summer once recovered from his surgery.  Only medication is atorvastatin 10mg.          5/2/2023     9:52 AM   Preop Questions   1. Have you ever had a heart attack or stroke? No   2. Have you ever had surgery on your heart or blood vessels, such as a stent placement, a coronary artery bypass, or surgery on an artery in your head, neck, heart, or legs? No   3. Do you have chest pain with activity? No   4. Do you have a history of  heart failure? No   5. Do you currently have a cold, bronchitis or symptoms of other infection? No   6. Do you have a cough, shortness of breath, or wheezing? No   7. Do you or anyone in your family have previous history of blood clots? YES - Mother   8. Do you or does anyone in your family have a serious bleeding problem such as prolonged bleeding following surgeries or cuts? No   9. Have you ever had problems with anemia or been told to take iron pills? No   10. Have you had any abnormal blood loss such as black, tarry or bloody stools? No   11. Have you ever had a blood transfusion? No   12. Are you willing to have a blood transfusion if it is medically needed before, during, or after your surgery? Yes   13. Have you or any of your relatives ever had problems with anesthesia? No   14. Do you have sleep apnea, excessive snoring or daytime drowsiness? Snores, no fatigue, never tested for FÁTIMA   15. Do you have any artifical heart valves or other implanted medical devices like a pacemaker, defibrillator, or continuous glucose monitor? No   16. Do you have artificial joints? No   17. Are you allergic to latex? No     Health Care Directive:  Patient does not have a Health Care Directive or Living Will: Patient states has Advance  Directive and will bring in a copy to clinic.    Preoperative Review of :   reviewed - no record of controlled substances prescribed.      Status of Chronic Conditions:  Suspected Undiagnosed HTN, trend seems to be elevated in chart, doesn't monitor at home.  Dyslipidemia  BMI 35.6  Reported past heart murmur (states no longer present)      Review of Systems   Constitutional: Negative for fever.   Respiratory: Negative for cough, chest tightness, shortness of breath and wheezing.    Cardiovascular: Negative for chest pain, palpitations and peripheral edema.   Gastrointestinal: Negative for abdominal pain.   Neurological: Negative for light-headedness and headaches.         Patient Active Problem List    Diagnosis Date Noted     Advance care planning 06/14/2016     Priority: Medium     Advance Care Planning 6/14/2016: Discussed advance care planning with patient; information given to patient to review. June 14, 2016.  Beau Cruz MA               Acoustic neuroma (H) 06/14/2016     Priority: Medium     left       Obesity, Class II, BMI 35-39.9 06/23/2014     Priority: Medium     CARDIOVASCULAR SCREENING; LDL GOAL LESS THAN 160 04/24/2012     Priority: Medium      Past Medical History:   Diagnosis Date     Acoustic neuroma (H) 6/14/2016    left      Heart murmur      History of vasectomy 1996     Hx of tonsillectomy      Obesity, Class III, BMI 40-49.9 (morbid obesity) (H) 6/23/2014     Rheumatic fever      Past Surgical History:   Procedure Laterality Date     ARTHROSCOPY KNEE RT/LT Right remote     ARTHROSCOPY SHOULDER RT/LT Right 2011    rotator cuff repair     Current Outpatient Medications   Medication Sig Dispense Refill     atorvastatin (LIPITOR) 10 MG tablet Take 1 tablet (10 mg) by mouth daily 90 tablet 3       No Known Allergies     Social History     Tobacco Use     Smoking status: Never     Smokeless tobacco: Never   Vaping Use     Vaping status: Not on file   Substance Use Topics     Alcohol use:  "Yes     Alcohol/week: 0.0 - 1.0 standard drinks of alcohol       History   Drug Use No         Objective     /82   Pulse 66   Temp (!) 96.7  F (35.9  C) (Tympanic)   Resp 18   Ht 1.791 m (5' 10.5\")   Wt 114.3 kg (251 lb 14.4 oz)   SpO2 96%   BMI 35.63 kg/m      Physical Exam  Constitutional:       General: He is not in acute distress.     Appearance: Normal appearance.   HENT:      Head: Normocephalic and atraumatic.      Right Ear: Tympanic membrane normal.      Left Ear: Tympanic membrane normal.      Mouth/Throat:      Mouth: Mucous membranes are moist.      Pharynx: Oropharynx is clear.   Eyes:      Conjunctiva/sclera: Conjunctivae normal.      Pupils: Pupils are equal, round, and reactive to light.   Neck:      Vascular: No carotid bruit.   Cardiovascular:      Rate and Rhythm: Normal rate and regular rhythm.      Heart sounds: Normal heart sounds. No murmur heard.  Pulmonary:      Effort: Pulmonary effort is normal.      Breath sounds: Normal breath sounds. No wheezing.   Musculoskeletal:      Cervical back: Normal range of motion.      Right lower leg: No edema.      Left lower leg: No edema.   Lymphadenopathy:      Cervical: No cervical adenopathy.   Neurological:      Mental Status: He is alert and oriented to person, place, and time.               Diagnostics:    Recent Results (from the past 48 hour(s))   TSH with free T4 reflex    Collection Time: 05/02/23 10:26 AM   Result Value Ref Range    TSH 2.56 0.30 - 4.20 uIU/mL   Albumin Random Urine Quantitative with Creat Ratio    Collection Time: 05/02/23 10:26 AM   Result Value Ref Range    Creatinine Urine mg/dL 331.5 mg/dL    Albumin Urine mg/L 31.2 mg/L    Albumin Urine mg/g Cr 9.41 0.00 - 17.00 mg/g Cr   Lipid Profile    Collection Time: 05/02/23 10:26 AM   Result Value Ref Range    Cholesterol 157 <200 mg/dL    Triglycerides 108 <150 mg/dL    Direct Measure HDL 54 >=40 mg/dL    LDL Cholesterol Calculated 81 <=100 mg/dL    Non HDL " Cholesterol 103 <130 mg/dL   Comprehensive metabolic panel    Collection Time: 05/02/23 10:26 AM   Result Value Ref Range    Sodium 140 136 - 145 mmol/L    Potassium 4.0 3.4 - 5.3 mmol/L    Chloride 103 98 - 107 mmol/L    Carbon Dioxide (CO2) 28 22 - 29 mmol/L    Anion Gap 9 7 - 15 mmol/L    Urea Nitrogen 12.3 8.0 - 23.0 mg/dL    Creatinine 1.06 0.67 - 1.17 mg/dL    Calcium 9.8 8.8 - 10.2 mg/dL    Glucose 114 (H) 70 - 99 mg/dL    Alkaline Phosphatase 84 40 - 129 U/L    AST 29 10 - 50 U/L    ALT 22 10 - 50 U/L    Protein Total 7.8 6.4 - 8.3 g/dL    Albumin 4.4 3.5 - 5.2 g/dL    Bilirubin Total 1.0 <=1.2 mg/dL    GFR Estimate 77 >60 mL/min/1.73m2   CBC with platelets and differential    Collection Time: 05/02/23 10:26 AM   Result Value Ref Range    WBC Count 7.0 4.0 - 11.0 10e3/uL    RBC Count 4.76 4.40 - 5.90 10e6/uL    Hemoglobin 14.5 13.3 - 17.7 g/dL    Hematocrit 43.3 40.0 - 53.0 %    MCV 91 78 - 100 fL    MCH 30.5 26.5 - 33.0 pg    MCHC 33.5 31.5 - 36.5 g/dL    RDW 12.2 10.0 - 15.0 %    Platelet Count 236 150 - 450 10e3/uL    % Neutrophils 49 %    % Lymphocytes 39 %    % Monocytes 9 %    % Eosinophils 2 %    % Basophils 1 %    % Immature Granulocytes 0 %    NRBCs per 100 WBC 0 <1 /100    Absolute Neutrophils 3.4 1.6 - 8.3 10e3/uL    Absolute Lymphocytes 2.8 0.8 - 5.3 10e3/uL    Absolute Monocytes 0.6 0.0 - 1.3 10e3/uL    Absolute Eosinophils 0.2 0.0 - 0.7 10e3/uL    Absolute Basophils 0.1 0.0 - 0.2 10e3/uL    Absolute Immature Granulocytes 0.0 <=0.4 10e3/uL    Absolute NRBCs 0.0 10e3/uL       EKG:  Sinus priyanka with 1avb, possible LVH, rate 56, QTc 420.  No significant change compared to EKG from 6/14/16.      Revised Cardiac Risk Index (RCRI):  The patient has the following serious cardiovascular risks for perioperative complications:   - No serious cardiac risks = 0 points     RCRI Interpretation: 0 points: Class I (very low risk - 0.4% complication rate)           Signed Electronically by: MENDEZ DE LEON,  DO  Copy of this evaluation report is provided to requesting physician.

## 2023-05-02 NOTE — H&P (VIEW-ONLY)
[facility] - [place]  [address]  [place] [address]  Phone: [phone]  Primary Provider: No Ref-Primary, Physician  Pre-op Performing Provider: MENDEZ DE LEON      PREOPERATIVE EVALUATION:  Today's date: 5/2/2023    Stephane Shaikh is a 68 year old male who presents for a preoperative evaluation.      5/2/2023     9:15 AM   Additional Questions   Roomed by Ludwig Lama LPN   Accompanied by Self         5/2/2023     9:15 AM   Patient Reported Additional Medications   Patient reports taking the following new medications None     Surgical Information:  Surgery/Procedure: TKA Right   Surgery Location: [facility]  Surgeon: Lorena Tariq MD  Surgery Date: 5/11/2023  Time of Surgery: 1000 am  Where patient plans to recover: At home with family  Fax number for surgical facility: Note does not need to be faxed, will be available electronically in Epic.    Assessment & Plan     The proposed surgical procedure is considered INTERMEDIATE risk.    Preoperative examination  - EKG 12-lead complete w/read - Clinics  - Comprehensive metabolic panel  - CBC with Platelets & Differential    Primary osteoarthritis of both knees    Benign essential hypertension  - TSH with free T4 reflex  - Albumin Random Urine Quantitative with Creat Ratio  - Comprehensive metabolic panel  - CBC with Platelets & Differential    Dyslipidemia  - Lipid Profile    Obesity, Class II, BMI 35-39.9    Encounter for vaccination  - PNEUMOCOCCAL 20 VALENT CONJUGATE (PREVNAR 20)            - No identified additional risk factors other than previously addressed    Antiplatelet or Anticoagulation Medication Instructions:   - Patient is on no antiplatelet or anticoagulation medications.    Additional Medication Instructions:  Patient is to take all scheduled medications on the day of surgery    RECOMMENDATION:  APPROVAL GIVEN to proceed with proposed procedure, without further diagnostic evaluation.        Subjective     HPI  related to upcoming procedure: preop right knee replacement  Patient originally scheduled as establish care visit with another visit coming up on Friday for a preop.  We'll do both today, and then see him back for an annual physical/preventative care visit later this summer once recovered from his surgery.  Only medication is atorvastatin 10mg.          5/2/2023     9:52 AM   Preop Questions   1. Have you ever had a heart attack or stroke? No   2. Have you ever had surgery on your heart or blood vessels, such as a stent placement, a coronary artery bypass, or surgery on an artery in your head, neck, heart, or legs? No   3. Do you have chest pain with activity? No   4. Do you have a history of  heart failure? No   5. Do you currently have a cold, bronchitis or symptoms of other infection? No   6. Do you have a cough, shortness of breath, or wheezing? No   7. Do you or anyone in your family have previous history of blood clots? YES - Mother   8. Do you or does anyone in your family have a serious bleeding problem such as prolonged bleeding following surgeries or cuts? No   9. Have you ever had problems with anemia or been told to take iron pills? No   10. Have you had any abnormal blood loss such as black, tarry or bloody stools? No   11. Have you ever had a blood transfusion? No   12. Are you willing to have a blood transfusion if it is medically needed before, during, or after your surgery? Yes   13. Have you or any of your relatives ever had problems with anesthesia? No   14. Do you have sleep apnea, excessive snoring or daytime drowsiness? Snores, no fatigue, never tested for FÁTIMA   15. Do you have any artifical heart valves or other implanted medical devices like a pacemaker, defibrillator, or continuous glucose monitor? No   16. Do you have artificial joints? No   17. Are you allergic to latex? No     Health Care Directive:  Patient does not have a Health Care Directive or Living Will: Patient states has Advance  Directive and will bring in a copy to clinic.    Preoperative Review of :   reviewed - no record of controlled substances prescribed.      Status of Chronic Conditions:  Suspected Undiagnosed HTN, trend seems to be elevated in chart, doesn't monitor at home.  Dyslipidemia  BMI 35.6  Reported past heart murmur (states no longer present)      Review of Systems   Constitutional: Negative for fever.   Respiratory: Negative for cough, chest tightness, shortness of breath and wheezing.    Cardiovascular: Negative for chest pain, palpitations and peripheral edema.   Gastrointestinal: Negative for abdominal pain.   Neurological: Negative for light-headedness and headaches.         Patient Active Problem List    Diagnosis Date Noted     Advance care planning 06/14/2016     Priority: Medium     Advance Care Planning 6/14/2016: Discussed advance care planning with patient; information given to patient to review. June 14, 2016.  Beau Cruz MA               Acoustic neuroma (H) 06/14/2016     Priority: Medium     left       Obesity, Class II, BMI 35-39.9 06/23/2014     Priority: Medium     CARDIOVASCULAR SCREENING; LDL GOAL LESS THAN 160 04/24/2012     Priority: Medium      Past Medical History:   Diagnosis Date     Acoustic neuroma (H) 6/14/2016    left      Heart murmur      History of vasectomy 1996     Hx of tonsillectomy      Obesity, Class III, BMI 40-49.9 (morbid obesity) (H) 6/23/2014     Rheumatic fever      Past Surgical History:   Procedure Laterality Date     ARTHROSCOPY KNEE RT/LT Right remote     ARTHROSCOPY SHOULDER RT/LT Right 2011    rotator cuff repair     Current Outpatient Medications   Medication Sig Dispense Refill     atorvastatin (LIPITOR) 10 MG tablet Take 1 tablet (10 mg) by mouth daily 90 tablet 3       No Known Allergies     Social History     Tobacco Use     Smoking status: Never     Smokeless tobacco: Never   Vaping Use     Vaping status: Not on file   Substance Use Topics     Alcohol use:  "Yes     Alcohol/week: 0.0 - 1.0 standard drinks of alcohol       History   Drug Use No         Objective     /82   Pulse 66   Temp (!) 96.7  F (35.9  C) (Tympanic)   Resp 18   Ht 1.791 m (5' 10.5\")   Wt 114.3 kg (251 lb 14.4 oz)   SpO2 96%   BMI 35.63 kg/m      Physical Exam  Constitutional:       General: He is not in acute distress.     Appearance: Normal appearance.   HENT:      Head: Normocephalic and atraumatic.      Right Ear: Tympanic membrane normal.      Left Ear: Tympanic membrane normal.      Mouth/Throat:      Mouth: Mucous membranes are moist.      Pharynx: Oropharynx is clear.   Eyes:      Conjunctiva/sclera: Conjunctivae normal.      Pupils: Pupils are equal, round, and reactive to light.   Neck:      Vascular: No carotid bruit.   Cardiovascular:      Rate and Rhythm: Normal rate and regular rhythm.      Heart sounds: Normal heart sounds. No murmur heard.  Pulmonary:      Effort: Pulmonary effort is normal.      Breath sounds: Normal breath sounds. No wheezing.   Musculoskeletal:      Cervical back: Normal range of motion.      Right lower leg: No edema.      Left lower leg: No edema.   Lymphadenopathy:      Cervical: No cervical adenopathy.   Neurological:      Mental Status: He is alert and oriented to person, place, and time.               Diagnostics:    Recent Results (from the past 48 hour(s))   TSH with free T4 reflex    Collection Time: 05/02/23 10:26 AM   Result Value Ref Range    TSH 2.56 0.30 - 4.20 uIU/mL   Albumin Random Urine Quantitative with Creat Ratio    Collection Time: 05/02/23 10:26 AM   Result Value Ref Range    Creatinine Urine mg/dL 331.5 mg/dL    Albumin Urine mg/L 31.2 mg/L    Albumin Urine mg/g Cr 9.41 0.00 - 17.00 mg/g Cr   Lipid Profile    Collection Time: 05/02/23 10:26 AM   Result Value Ref Range    Cholesterol 157 <200 mg/dL    Triglycerides 108 <150 mg/dL    Direct Measure HDL 54 >=40 mg/dL    LDL Cholesterol Calculated 81 <=100 mg/dL    Non HDL " Cholesterol 103 <130 mg/dL   Comprehensive metabolic panel    Collection Time: 05/02/23 10:26 AM   Result Value Ref Range    Sodium 140 136 - 145 mmol/L    Potassium 4.0 3.4 - 5.3 mmol/L    Chloride 103 98 - 107 mmol/L    Carbon Dioxide (CO2) 28 22 - 29 mmol/L    Anion Gap 9 7 - 15 mmol/L    Urea Nitrogen 12.3 8.0 - 23.0 mg/dL    Creatinine 1.06 0.67 - 1.17 mg/dL    Calcium 9.8 8.8 - 10.2 mg/dL    Glucose 114 (H) 70 - 99 mg/dL    Alkaline Phosphatase 84 40 - 129 U/L    AST 29 10 - 50 U/L    ALT 22 10 - 50 U/L    Protein Total 7.8 6.4 - 8.3 g/dL    Albumin 4.4 3.5 - 5.2 g/dL    Bilirubin Total 1.0 <=1.2 mg/dL    GFR Estimate 77 >60 mL/min/1.73m2   CBC with platelets and differential    Collection Time: 05/02/23 10:26 AM   Result Value Ref Range    WBC Count 7.0 4.0 - 11.0 10e3/uL    RBC Count 4.76 4.40 - 5.90 10e6/uL    Hemoglobin 14.5 13.3 - 17.7 g/dL    Hematocrit 43.3 40.0 - 53.0 %    MCV 91 78 - 100 fL    MCH 30.5 26.5 - 33.0 pg    MCHC 33.5 31.5 - 36.5 g/dL    RDW 12.2 10.0 - 15.0 %    Platelet Count 236 150 - 450 10e3/uL    % Neutrophils 49 %    % Lymphocytes 39 %    % Monocytes 9 %    % Eosinophils 2 %    % Basophils 1 %    % Immature Granulocytes 0 %    NRBCs per 100 WBC 0 <1 /100    Absolute Neutrophils 3.4 1.6 - 8.3 10e3/uL    Absolute Lymphocytes 2.8 0.8 - 5.3 10e3/uL    Absolute Monocytes 0.6 0.0 - 1.3 10e3/uL    Absolute Eosinophils 0.2 0.0 - 0.7 10e3/uL    Absolute Basophils 0.1 0.0 - 0.2 10e3/uL    Absolute Immature Granulocytes 0.0 <=0.4 10e3/uL    Absolute NRBCs 0.0 10e3/uL       EKG:  Sinus priyanka with 1avb, possible LVH, rate 56, QTc 420.  No significant change compared to EKG from 6/14/16.      Revised Cardiac Risk Index (RCRI):  The patient has the following serious cardiovascular risks for perioperative complications:   - No serious cardiac risks = 0 points     RCRI Interpretation: 0 points: Class I (very low risk - 0.4% complication rate)           Signed Electronically by: MENDEZ DE LEON,  DO  Copy of this evaluation report is provided to requesting physician.

## 2023-05-02 NOTE — PROGRESS NOTES
Orthopedic Clinic Follow-up Visit  May 2, 2023        Chief Complaint:  Bilateral knee pain     HPI:  Sam is a very pleasant 68-year-old who presents to clinic today for preoperative discussion.  He is scheduled for right total knee arthroplasty on 5/11/2023 with Dr. Tariq.  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  He noted area of swelling over the proximal fibular head that began in January, increased for about a month and then stopped.  It is not painful and has not further increased in size since January.    He pimentel in [place] and just returned to [place].  He enjoys playing [employer] ball.  He is retired.  He was last seen in clinic on 10/4/2022 at which time he underwent bilateral knee injections.  Injection lasted for about 3 to 4 months for both knees.  Now the right knee pain is progressively worsening over the last several months.    History of removal of body from the left knee over 30 years ago that healed without complication.  No history of heart attack, stroke or DVT.  The only medication he takes is Lipitor for cholesterol.  He is a retired .     Physical Exam:   On physical examination the patient appears the stated age, is in no acute distress, affect is appropriate, and breathing is non-labored.    LLE:  Exam of the left knee demonstrates skin intact.  No rashes or lesions.  No presence of effusion, erythema or increased warmth.  Knee range of motion 0/10/110.  No crepitus appreciated.  Straight leg raise intact without extensor lag.  Knee is stable to varus and valgus stress, anterior posterior drawer sign.  Lachman's Ia.  Calf soft and nontender.     Motor intact distally TA/GSC/EHL/FHL with 5/5 strength. SILT sp/dp/tibial/saph/sural nerves. DP/PT pulses palpable, 2+, toes warm and well perfused.     RLE:      Exam of the right knee demonstrates no presence of fluid wave shift.  Skin intact without rashes or lesions.  No increased warmth or erythema present.  Knee range of motion 0/4/123.  The knee is stable to varus and valgus stress, anterior and posterior drawer sign.  Lachman's Ia. Presence of lateral knee mass overlying lateral knee jointline/proximal fibula. Firm, non-mobile, compressible.   Motor intact distally TA/GSC/EHL/FHL with 5/5 strength. SILT sp/dp/tibial/saph/sural nerves. DP/PT pulses palpable, 2+, toes warm and well perfused.     Imaging:   Long leg standing films are reviewed from 5/10/2022.  There is approximately 12 degrees of varus deformity on the right and 11 degrees on the left mechanical axes.  20 degree flexion views of the patella taken on this date also demonstrate bilateral patellofemoral arthrosis and joint space narrowing, right greater than left.    AP and lateral views of the right knee were taken today.  These demonstrate end-stage arthritis of the medial compartment, more advanced compared to x-rays taken in May 2022.  No visible mass or lesion over the lateral knee coinciding with clinically palpated mass.  No evidence for bony destruction.     Impression:   Bilateral advanced DJD, in the setting of varus alignment.   Right lateral knee mass consistent with ganglion cyst.      Plan:   We discussed his upcoming surgery next week and went over questions he had leading up to surgery.  Reviewed that this is likely to require overnight stay to gauge his pain management and mobility prior to discharge.  He is planning on staying with his daughter following surgery.  He is otherwise living in an , which he feels is providing him comfortable living arrangements to recover from surgery.  He is doing this while they are planning on rebuilding their house this summer.    Discussed repeat left knee cortisone injection at the time of surgery.  He completed his preoperative clearance physical  exam with his primary care provider today.  He also has plans to return for his annual physical exam with his primary in the near future.    Patient seen, evaluated and discussed with Dr. Tariq who is in agreement with the above assessment and plan.    Ana Kingsley PA-C    I agree with the contents of this notes    Lorena Tariq MD  Professor Orthopedic Surgery  [facility]   5/2/20232:32 PM

## 2023-05-02 NOTE — LETTER
5/2/2023         RE: Stephane Shaikh  [address] [address]        Dear Colleague,    Thank you for referring your patient, Stephane Shaikh, to the [facility] [place]. Please see a copy of my visit note below.    Orthopedic Clinic Follow-up Visit  May 2, 2023        Chief Complaint:  Bilateral knee pain     HPI:  Sam is a very pleasant 68-year-old who presents to clinic today for preoperative discussion.  He is scheduled for right total knee arthroplasty on 5/11/2023 with Dr. Tariq.  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  He noted area of swelling over the proximal fibular head that began in January, increased for about a month and then stopped.  It is not painful and has not further increased in size since January.    He pimentel in [place] and just returned to [place].  He enjoys playing [employer] ball.  He is retired.  He was last seen in clinic on 10/4/2022 at which time he underwent bilateral knee injections.  Injection lasted for about 3 to 4 months for both knees.  Now the right knee pain is progressively worsening over the last several months.    History of removal of body from the left knee over 30 years ago that healed without complication.  No history of heart attack, stroke or DVT.  The only medication he takes is Lipitor for cholesterol.  He is a retired .     Physical Exam:   On physical examination the patient appears the stated age, is in no acute distress, affect is appropriate, and breathing is non-labored.    LLE:  Exam of the left knee demonstrates skin intact.  No rashes or lesions.  No presence of effusion, erythema or increased warmth.  Knee range of motion 0/10/110.  No crepitus appreciated.  Straight leg raise intact without extensor lag.  Knee is stable to varus  and valgus stress, anterior posterior drawer sign.  Lachman's Ia.  Calf soft and nontender.     Motor intact distally TA/GSC/EHL/FHL with 5/5 strength. SILT sp/dp/tibial/saph/sural nerves. DP/PT pulses palpable, 2+, toes warm and well perfused.     RLE:     Exam of the right knee demonstrates no presence of fluid wave shift.  Skin intact without rashes or lesions.  No increased warmth or erythema present.  Knee range of motion 0/4/123.  The knee is stable to varus and valgus stress, anterior and posterior drawer sign.  Lachman's Ia. Presence of lateral knee mass overlying lateral knee jointline/proximal fibula. Firm, non-mobile, compressible.   Motor intact distally TA/GSC/EHL/FHL with 5/5 strength. SILT sp/dp/tibial/saph/sural nerves. DP/PT pulses palpable, 2+, toes warm and well perfused.     Imaging:   Long leg standing films are reviewed from 5/10/2022.  There is approximately 12 degrees of varus deformity on the right and 11 degrees on the left mechanical axes.  20 degree flexion views of the patella taken on this date also demonstrate bilateral patellofemoral arthrosis and joint space narrowing, right greater than left.    AP and lateral views of the right knee were taken today.  These demonstrate end-stage arthritis of the medial compartment, more advanced compared to x-rays taken in May 2022.  No visible mass or lesion over the lateral knee coinciding with clinically palpated mass.  No evidence for bony destruction.     Impression:   Bilateral advanced DJD, in the setting of varus alignment.   Right lateral knee mass consistent with ganglion cyst.      Plan:   We discussed his upcoming surgery next week and went over questions he had leading up to surgery.  Reviewed that this is likely to require overnight stay to gauge his pain management and mobility prior to discharge.  He is planning on staying with his daughter following surgery.  He is otherwise living in an RV, which he feels is providing him  comfortable living arrangements to recover from surgery.  He is doing this while they are planning on rebuilding their house this summer.    Discussed repeat left knee cortisone injection at the time of surgery.  He completed his preoperative clearance physical exam with his primary care provider today.  He also has plans to return for his annual physical exam with his primary in the near future.    Patient seen, evaluated and discussed with Dr. Tariq who is in agreement with the above assessment and plan.    Ana Kingsley PA-C    I agree with the contents of this notes    Lorena Tariq MD  Professor Orthopedic Surgery  [facility]   5/2/20232:32 PM

## 2023-05-03 RX ORDER — ASPIRIN 81 MG/1
81 TABLET ORAL DAILY
Status: ON HOLD | COMMUNITY
End: 2023-05-12

## 2023-05-04 ENCOUNTER — PREP FOR PROCEDURE (OUTPATIENT)
Dept: ORTHOPEDICS | Facility: CLINIC | Age: 68
End: 2023-05-04
Payer: COMMERCIAL

## 2023-05-08 RX ORDER — MULTIPLE VITAMINS W/ MINERALS TAB 9MG-400MCG
1 TAB ORAL DAILY
COMMUNITY

## 2023-05-08 RX ORDER — PHENOL 1.4 %
10 AEROSOL, SPRAY (ML) MUCOUS MEMBRANE
COMMUNITY

## 2023-05-08 RX ORDER — ACETAMINOPHEN 500 MG
500-1000 TABLET ORAL EVERY 6 HOURS PRN
Status: ON HOLD | COMMUNITY
End: 2023-05-12

## 2023-05-08 RX ORDER — AMOXICILLIN 500 MG
1200 CAPSULE ORAL DAILY
COMMUNITY

## 2023-05-08 RX ORDER — SODIUM PHOSPHATE,MONO-DIBASIC 19G-7G/118
1000 ENEMA (ML) RECTAL DAILY
COMMUNITY

## 2023-05-09 ENCOUNTER — DOCUMENTATION ONLY (OUTPATIENT)
Dept: OTHER | Facility: CLINIC | Age: 68
End: 2023-05-09

## 2023-05-10 ENCOUNTER — ANESTHESIA EVENT (OUTPATIENT)
Dept: SURGERY | Facility: CLINIC | Age: 68
End: 2023-05-10
Payer: MEDICARE

## 2023-05-11 ENCOUNTER — HOSPITAL ENCOUNTER (OUTPATIENT)
Facility: CLINIC | Age: 68
Discharge: HOME HEALTH | End: 2023-05-12
Attending: ORTHOPAEDIC SURGERY | Admitting: ORTHOPAEDIC SURGERY
Payer: MEDICARE

## 2023-05-11 ENCOUNTER — APPOINTMENT (OUTPATIENT)
Dept: GENERAL RADIOLOGY | Facility: CLINIC | Age: 68
End: 2023-05-11
Attending: PHYSICIAN ASSISTANT
Payer: MEDICARE

## 2023-05-11 ENCOUNTER — ANESTHESIA (OUTPATIENT)
Dept: SURGERY | Facility: CLINIC | Age: 68
End: 2023-05-11
Payer: MEDICARE

## 2023-05-11 DIAGNOSIS — Z96.651 STATUS POST TOTAL RIGHT KNEE REPLACEMENT: ICD-10-CM

## 2023-05-11 DIAGNOSIS — M17.10 ARTHROSIS OF KNEE: Primary | ICD-10-CM

## 2023-05-11 LAB — GLUCOSE BLDC GLUCOMTR-MCNC: 113 MG/DL (ref 70–99)

## 2023-05-11 PROCEDURE — 250N000011 HC RX IP 250 OP 636: Performed by: NURSE ANESTHETIST, CERTIFIED REGISTERED

## 2023-05-11 PROCEDURE — 250N000009 HC RX 250: Performed by: NURSE ANESTHETIST, CERTIFIED REGISTERED

## 2023-05-11 PROCEDURE — 250N000011 HC RX IP 250 OP 636: Performed by: PHYSICIAN ASSISTANT

## 2023-05-11 PROCEDURE — 360N000077 HC SURGERY LEVEL 4, PER MIN: Performed by: ORTHOPAEDIC SURGERY

## 2023-05-11 PROCEDURE — 250N000011 HC RX IP 250 OP 636: Performed by: ORTHOPAEDIC SURGERY

## 2023-05-11 PROCEDURE — 250N000013 HC RX MED GY IP 250 OP 250 PS 637: Performed by: PHYSICIAN ASSISTANT

## 2023-05-11 PROCEDURE — 258N000003 HC RX IP 258 OP 636

## 2023-05-11 PROCEDURE — 999N000065 XR KNEE PORT RIGHT 1/2 VIEWS: Mod: RT

## 2023-05-11 PROCEDURE — 250N000011 HC RX IP 250 OP 636: Performed by: ANESTHESIOLOGY

## 2023-05-11 PROCEDURE — 27447 TOTAL KNEE ARTHROPLASTY: CPT | Mod: RT | Performed by: ORTHOPAEDIC SURGERY

## 2023-05-11 PROCEDURE — 370N000017 HC ANESTHESIA TECHNICAL FEE, PER MIN: Performed by: ORTHOPAEDIC SURGERY

## 2023-05-11 PROCEDURE — C1776 JOINT DEVICE (IMPLANTABLE): HCPCS | Performed by: ORTHOPAEDIC SURGERY

## 2023-05-11 PROCEDURE — 99213 OFFICE O/P EST LOW 20 MIN: CPT | Performed by: PHYSICIAN ASSISTANT

## 2023-05-11 PROCEDURE — 250N000009 HC RX 250: Performed by: ORTHOPAEDIC SURGERY

## 2023-05-11 PROCEDURE — 710N000010 HC RECOVERY PHASE 1, LEVEL 2, PER MIN: Performed by: ORTHOPAEDIC SURGERY

## 2023-05-11 PROCEDURE — C1713 ANCHOR/SCREW BN/BN,TIS/BN: HCPCS | Performed by: ORTHOPAEDIC SURGERY

## 2023-05-11 PROCEDURE — 272N000001 HC OR GENERAL SUPPLY STERILE: Performed by: ORTHOPAEDIC SURGERY

## 2023-05-11 PROCEDURE — 258N000001 HC RX 258: Performed by: ORTHOPAEDIC SURGERY

## 2023-05-11 PROCEDURE — 258N000003 HC RX IP 258 OP 636: Performed by: NURSE ANESTHETIST, CERTIFIED REGISTERED

## 2023-05-11 PROCEDURE — 999N000141 HC STATISTIC PRE-PROCEDURE NURSING ASSESSMENT: Performed by: ORTHOPAEDIC SURGERY

## 2023-05-11 PROCEDURE — 20610 DRAIN/INJ JOINT/BURSA W/O US: CPT | Mod: 59 | Performed by: ORTHOPAEDIC SURGERY

## 2023-05-11 PROCEDURE — 258N000003 HC RX IP 258 OP 636: Performed by: PHYSICIAN ASSISTANT

## 2023-05-11 DEVICE — SPEEDSET FULL DOSE ANTIBIOTIC BONE CEMENT, 10 PACK CATALOG NUMBER IS 6192-1-010
Type: IMPLANTABLE DEVICE | Site: KNEE | Status: FUNCTIONAL
Brand: SIMPLEX

## 2023-05-11 DEVICE — PRIMARY TIBIAL BASEPLATE
Type: IMPLANTABLE DEVICE | Site: KNEE | Status: FUNCTIONAL
Brand: TRIATHLON

## 2023-05-11 DEVICE — TIBIAL BEARING INSERT - PS
Type: IMPLANTABLE DEVICE | Site: KNEE | Status: FUNCTIONAL
Brand: TRIATHLON

## 2023-05-11 DEVICE — PATELLA
Type: IMPLANTABLE DEVICE | Site: KNEE | Status: FUNCTIONAL
Brand: TRIATHLON

## 2023-05-11 DEVICE — POSTERIOR STABILIZED FEMORAL
Type: IMPLANTABLE DEVICE | Site: KNEE | Status: FUNCTIONAL
Brand: TRIATHLON

## 2023-05-11 RX ORDER — PROPOFOL 10 MG/ML
INJECTION, EMULSION INTRAVENOUS CONTINUOUS PRN
Status: DISCONTINUED | OUTPATIENT
Start: 2023-05-11 | End: 2023-05-11

## 2023-05-11 RX ORDER — ATORVASTATIN CALCIUM 10 MG/1
10 TABLET, FILM COATED ORAL EVERY EVENING
Status: DISCONTINUED | OUTPATIENT
Start: 2023-05-11 | End: 2023-05-12 | Stop reason: HOSPADM

## 2023-05-11 RX ORDER — ONDANSETRON 4 MG/1
4 TABLET, ORALLY DISINTEGRATING ORAL EVERY 6 HOURS PRN
Status: DISCONTINUED | OUTPATIENT
Start: 2023-05-11 | End: 2023-05-12 | Stop reason: HOSPADM

## 2023-05-11 RX ORDER — OXYCODONE HYDROCHLORIDE 5 MG/1
5 TABLET ORAL EVERY 4 HOURS PRN
Status: DISCONTINUED | OUTPATIENT
Start: 2023-05-11 | End: 2023-05-12 | Stop reason: HOSPADM

## 2023-05-11 RX ORDER — ACETAMINOPHEN 325 MG/1
975 TABLET ORAL ONCE
Status: COMPLETED | OUTPATIENT
Start: 2023-05-11 | End: 2023-05-11

## 2023-05-11 RX ORDER — ASPIRIN 81 MG/1
81 TABLET ORAL 2 TIMES DAILY
Qty: 60 TABLET | Refills: 0 | Status: SHIPPED | OUTPATIENT
Start: 2023-05-11 | End: 2024-05-21

## 2023-05-11 RX ORDER — ACETAMINOPHEN 325 MG/1
650 TABLET ORAL EVERY 4 HOURS PRN
Status: DISCONTINUED | OUTPATIENT
Start: 2023-05-14 | End: 2023-05-12 | Stop reason: HOSPADM

## 2023-05-11 RX ORDER — HYDROMORPHONE HYDROCHLORIDE 1 MG/ML
0.2 INJECTION, SOLUTION INTRAMUSCULAR; INTRAVENOUS; SUBCUTANEOUS EVERY 5 MIN PRN
Status: DISCONTINUED | OUTPATIENT
Start: 2023-05-11 | End: 2023-05-11 | Stop reason: HOSPADM

## 2023-05-11 RX ORDER — ACETAMINOPHEN 325 MG/1
975 TABLET ORAL EVERY 8 HOURS
Status: DISCONTINUED | OUTPATIENT
Start: 2023-05-11 | End: 2023-05-12 | Stop reason: HOSPADM

## 2023-05-11 RX ORDER — NALOXONE HYDROCHLORIDE 0.4 MG/ML
0.2 INJECTION, SOLUTION INTRAMUSCULAR; INTRAVENOUS; SUBCUTANEOUS
Status: DISCONTINUED | OUTPATIENT
Start: 2023-05-11 | End: 2023-05-11 | Stop reason: HOSPADM

## 2023-05-11 RX ORDER — BUPIVACAINE HYDROCHLORIDE 7.5 MG/ML
INJECTION, SOLUTION INTRASPINAL
Status: COMPLETED | OUTPATIENT
Start: 2023-05-11 | End: 2023-05-11

## 2023-05-11 RX ORDER — CEFAZOLIN SODIUM 2 G/100ML
2 INJECTION, SOLUTION INTRAVENOUS EVERY 8 HOURS
Status: COMPLETED | OUTPATIENT
Start: 2023-05-11 | End: 2023-05-12

## 2023-05-11 RX ORDER — HYDROMORPHONE HYDROCHLORIDE 1 MG/ML
0.4 INJECTION, SOLUTION INTRAMUSCULAR; INTRAVENOUS; SUBCUTANEOUS EVERY 5 MIN PRN
Status: DISCONTINUED | OUTPATIENT
Start: 2023-05-11 | End: 2023-05-11 | Stop reason: HOSPADM

## 2023-05-11 RX ORDER — NALOXONE HYDROCHLORIDE 0.4 MG/ML
0.2 INJECTION, SOLUTION INTRAMUSCULAR; INTRAVENOUS; SUBCUTANEOUS
Status: DISCONTINUED | OUTPATIENT
Start: 2023-05-11 | End: 2023-05-12 | Stop reason: HOSPADM

## 2023-05-11 RX ORDER — CEFAZOLIN SODIUM/WATER 2 G/20 ML
2 SYRINGE (ML) INTRAVENOUS
Status: COMPLETED | OUTPATIENT
Start: 2023-05-11 | End: 2023-05-11

## 2023-05-11 RX ORDER — BISACODYL 10 MG
10 SUPPOSITORY, RECTAL RECTAL DAILY PRN
Status: DISCONTINUED | OUTPATIENT
Start: 2023-05-11 | End: 2023-05-12 | Stop reason: HOSPADM

## 2023-05-11 RX ORDER — TRIAMCINOLONE ACETONIDE 40 MG/ML
INJECTION, SUSPENSION INTRA-ARTICULAR; INTRAMUSCULAR PRN
Status: DISCONTINUED | OUTPATIENT
Start: 2023-05-11 | End: 2023-05-12 | Stop reason: HOSPADM

## 2023-05-11 RX ORDER — NALOXONE HYDROCHLORIDE 0.4 MG/ML
0.4 INJECTION, SOLUTION INTRAMUSCULAR; INTRAVENOUS; SUBCUTANEOUS
Status: DISCONTINUED | OUTPATIENT
Start: 2023-05-11 | End: 2023-05-12 | Stop reason: HOSPADM

## 2023-05-11 RX ORDER — HYDROMORPHONE HYDROCHLORIDE 1 MG/ML
0.4 INJECTION, SOLUTION INTRAMUSCULAR; INTRAVENOUS; SUBCUTANEOUS
Status: DISCONTINUED | OUTPATIENT
Start: 2023-05-11 | End: 2023-05-12 | Stop reason: HOSPADM

## 2023-05-11 RX ORDER — AMOXICILLIN 250 MG
1-2 CAPSULE ORAL 2 TIMES DAILY
Qty: 30 TABLET | Refills: 0 | Status: SHIPPED | OUTPATIENT
Start: 2023-05-11 | End: 2024-05-06

## 2023-05-11 RX ORDER — METHOCARBAMOL 500 MG/1
500 TABLET, FILM COATED ORAL EVERY 6 HOURS PRN
Status: DISCONTINUED | OUTPATIENT
Start: 2023-05-11 | End: 2023-05-12 | Stop reason: HOSPADM

## 2023-05-11 RX ORDER — ONDANSETRON 4 MG/1
4-8 TABLET, ORALLY DISINTEGRATING ORAL EVERY 8 HOURS PRN
Qty: 10 TABLET | Refills: 0 | Status: SHIPPED | OUTPATIENT
Start: 2023-05-11 | End: 2024-05-06

## 2023-05-11 RX ORDER — SODIUM CHLORIDE 9 MG/ML
INJECTION, SOLUTION INTRAVENOUS CONTINUOUS
Status: DISCONTINUED | OUTPATIENT
Start: 2023-05-11 | End: 2023-05-12 | Stop reason: HOSPADM

## 2023-05-11 RX ORDER — OXYCODONE HYDROCHLORIDE 5 MG/1
10 TABLET ORAL EVERY 4 HOURS PRN
Status: DISCONTINUED | OUTPATIENT
Start: 2023-05-11 | End: 2023-05-12 | Stop reason: HOSPADM

## 2023-05-11 RX ORDER — SODIUM CHLORIDE 9 MG/ML
INJECTION, SOLUTION INTRAVENOUS
Status: COMPLETED
Start: 2023-05-11 | End: 2023-05-11

## 2023-05-11 RX ORDER — ONDANSETRON 2 MG/ML
4 INJECTION INTRAMUSCULAR; INTRAVENOUS EVERY 6 HOURS PRN
Status: DISCONTINUED | OUTPATIENT
Start: 2023-05-11 | End: 2023-05-12 | Stop reason: HOSPADM

## 2023-05-11 RX ORDER — EPHEDRINE SULFATE 50 MG/ML
INJECTION, SOLUTION INTRAMUSCULAR; INTRAVENOUS; SUBCUTANEOUS PRN
Status: DISCONTINUED | OUTPATIENT
Start: 2023-05-11 | End: 2023-05-11

## 2023-05-11 RX ORDER — HYDROXYZINE HYDROCHLORIDE 10 MG/1
10 TABLET, FILM COATED ORAL EVERY 6 HOURS PRN
Status: DISCONTINUED | OUTPATIENT
Start: 2023-05-11 | End: 2023-05-11 | Stop reason: HOSPADM

## 2023-05-11 RX ORDER — NALOXONE HYDROCHLORIDE 0.4 MG/ML
0.4 INJECTION, SOLUTION INTRAMUSCULAR; INTRAVENOUS; SUBCUTANEOUS
Status: DISCONTINUED | OUTPATIENT
Start: 2023-05-11 | End: 2023-05-11 | Stop reason: HOSPADM

## 2023-05-11 RX ORDER — SODIUM CHLORIDE, SODIUM LACTATE, POTASSIUM CHLORIDE, CALCIUM CHLORIDE 600; 310; 30; 20 MG/100ML; MG/100ML; MG/100ML; MG/100ML
INJECTION, SOLUTION INTRAVENOUS CONTINUOUS
Status: DISCONTINUED | OUTPATIENT
Start: 2023-05-11 | End: 2023-05-11 | Stop reason: HOSPADM

## 2023-05-11 RX ORDER — SODIUM CHLORIDE, SODIUM LACTATE, POTASSIUM CHLORIDE, CALCIUM CHLORIDE 600; 310; 30; 20 MG/100ML; MG/100ML; MG/100ML; MG/100ML
INJECTION, SOLUTION INTRAVENOUS CONTINUOUS PRN
Status: DISCONTINUED | OUTPATIENT
Start: 2023-05-11 | End: 2023-05-11

## 2023-05-11 RX ORDER — ASPIRIN 81 MG/1
81 TABLET ORAL 2 TIMES DAILY
Status: DISCONTINUED | OUTPATIENT
Start: 2023-05-11 | End: 2023-05-12 | Stop reason: HOSPADM

## 2023-05-11 RX ORDER — POLYETHYLENE GLYCOL 3350 17 G/17G
1 POWDER, FOR SOLUTION ORAL DAILY
Qty: 7 PACKET | Refills: 0 | Status: SHIPPED | OUTPATIENT
Start: 2023-05-11 | End: 2024-05-06

## 2023-05-11 RX ORDER — DEXAMETHASONE SODIUM PHOSPHATE 4 MG/ML
INJECTION, SOLUTION INTRA-ARTICULAR; INTRALESIONAL; INTRAMUSCULAR; INTRAVENOUS; SOFT TISSUE PRN
Status: DISCONTINUED | OUTPATIENT
Start: 2023-05-11 | End: 2023-05-11

## 2023-05-11 RX ORDER — ONDANSETRON 2 MG/ML
INJECTION INTRAMUSCULAR; INTRAVENOUS PRN
Status: DISCONTINUED | OUTPATIENT
Start: 2023-05-11 | End: 2023-05-11

## 2023-05-11 RX ORDER — CELECOXIB 200 MG/1
400 CAPSULE ORAL ONCE
Status: COMPLETED | OUTPATIENT
Start: 2023-05-11 | End: 2023-05-11

## 2023-05-11 RX ORDER — TRANEXAMIC ACID 650 MG/1
1950 TABLET ORAL ONCE
Status: COMPLETED | OUTPATIENT
Start: 2023-05-11 | End: 2023-05-11

## 2023-05-11 RX ORDER — LIDOCAINE 40 MG/G
CREAM TOPICAL
Status: DISCONTINUED | OUTPATIENT
Start: 2023-05-11 | End: 2023-05-12 | Stop reason: HOSPADM

## 2023-05-11 RX ORDER — ONDANSETRON 2 MG/ML
4 INJECTION INTRAMUSCULAR; INTRAVENOUS EVERY 30 MIN PRN
Status: DISCONTINUED | OUTPATIENT
Start: 2023-05-11 | End: 2023-05-11 | Stop reason: HOSPADM

## 2023-05-11 RX ORDER — METOPROLOL TARTRATE 1 MG/ML
1-2 INJECTION, SOLUTION INTRAVENOUS EVERY 5 MIN PRN
Status: DISCONTINUED | OUTPATIENT
Start: 2023-05-11 | End: 2023-05-11 | Stop reason: HOSPADM

## 2023-05-11 RX ORDER — HYDROMORPHONE HYDROCHLORIDE 1 MG/ML
0.2 INJECTION, SOLUTION INTRAMUSCULAR; INTRAVENOUS; SUBCUTANEOUS
Status: DISCONTINUED | OUTPATIENT
Start: 2023-05-11 | End: 2023-05-12 | Stop reason: HOSPADM

## 2023-05-11 RX ORDER — FENTANYL CITRATE 50 UG/ML
50 INJECTION, SOLUTION INTRAMUSCULAR; INTRAVENOUS EVERY 5 MIN PRN
Status: DISCONTINUED | OUTPATIENT
Start: 2023-05-11 | End: 2023-05-11 | Stop reason: HOSPADM

## 2023-05-11 RX ORDER — HYDRALAZINE HYDROCHLORIDE 20 MG/ML
2.5-5 INJECTION INTRAMUSCULAR; INTRAVENOUS EVERY 10 MIN PRN
Status: DISCONTINUED | OUTPATIENT
Start: 2023-05-11 | End: 2023-05-11 | Stop reason: HOSPADM

## 2023-05-11 RX ORDER — ONDANSETRON 4 MG/1
4 TABLET, ORALLY DISINTEGRATING ORAL EVERY 30 MIN PRN
Status: DISCONTINUED | OUTPATIENT
Start: 2023-05-11 | End: 2023-05-11 | Stop reason: HOSPADM

## 2023-05-11 RX ORDER — LIDOCAINE HYDROCHLORIDE 10 MG/ML
INJECTION, SOLUTION EPIDURAL; INFILTRATION; INTRACAUDAL; PERINEURAL PRN
Status: DISCONTINUED | OUTPATIENT
Start: 2023-05-11 | End: 2023-05-12 | Stop reason: HOSPADM

## 2023-05-11 RX ORDER — POLYETHYLENE GLYCOL 3350 17 G/17G
17 POWDER, FOR SOLUTION ORAL DAILY
Status: DISCONTINUED | OUTPATIENT
Start: 2023-05-12 | End: 2023-05-12 | Stop reason: HOSPADM

## 2023-05-11 RX ORDER — ACETAMINOPHEN 325 MG/1
650 TABLET ORAL EVERY 4 HOURS PRN
Qty: 100 TABLET | Refills: 0 | Status: SHIPPED | OUTPATIENT
Start: 2023-05-11 | End: 2024-05-06

## 2023-05-11 RX ORDER — OXYCODONE HYDROCHLORIDE 5 MG/1
5-10 TABLET ORAL EVERY 4 HOURS PRN
Qty: 26 TABLET | Refills: 0 | Status: SHIPPED | OUTPATIENT
Start: 2023-05-11 | End: 2023-05-22

## 2023-05-11 RX ORDER — CEFAZOLIN SODIUM/WATER 2 G/20 ML
2 SYRINGE (ML) INTRAVENOUS SEE ADMIN INSTRUCTIONS
Status: DISCONTINUED | OUTPATIENT
Start: 2023-05-11 | End: 2023-05-11 | Stop reason: HOSPADM

## 2023-05-11 RX ORDER — AMOXICILLIN 250 MG
1 CAPSULE ORAL 2 TIMES DAILY
Status: DISCONTINUED | OUTPATIENT
Start: 2023-05-11 | End: 2023-05-12 | Stop reason: HOSPADM

## 2023-05-11 RX ORDER — FENTANYL CITRATE 50 UG/ML
25 INJECTION, SOLUTION INTRAMUSCULAR; INTRAVENOUS EVERY 5 MIN PRN
Status: DISCONTINUED | OUTPATIENT
Start: 2023-05-11 | End: 2023-05-11 | Stop reason: HOSPADM

## 2023-05-11 RX ORDER — MEPERIDINE HYDROCHLORIDE 25 MG/ML
12.5 INJECTION INTRAMUSCULAR; INTRAVENOUS; SUBCUTANEOUS EVERY 5 MIN PRN
Status: DISCONTINUED | OUTPATIENT
Start: 2023-05-11 | End: 2023-05-11 | Stop reason: HOSPADM

## 2023-05-11 RX ADMIN — Medication 5 MG: at 11:14

## 2023-05-11 RX ADMIN — PHENYLEPHRINE HYDROCHLORIDE 50 MCG: 10 INJECTION INTRAVENOUS at 11:00

## 2023-05-11 RX ADMIN — DEXAMETHASONE SODIUM PHOSPHATE 8 MG: 4 INJECTION, SOLUTION INTRA-ARTICULAR; INTRALESIONAL; INTRAMUSCULAR; INTRAVENOUS; SOFT TISSUE at 11:07

## 2023-05-11 RX ADMIN — PHENYLEPHRINE HYDROCHLORIDE 100 MCG: 10 INJECTION INTRAVENOUS at 11:07

## 2023-05-11 RX ADMIN — SODIUM CHLORIDE, POTASSIUM CHLORIDE, SODIUM LACTATE AND CALCIUM CHLORIDE: 600; 310; 30; 20 INJECTION, SOLUTION INTRAVENOUS at 11:30

## 2023-05-11 RX ADMIN — OXYCODONE HYDROCHLORIDE 5 MG: 5 TABLET ORAL at 13:51

## 2023-05-11 RX ADMIN — Medication 5 MG: at 11:20

## 2023-05-11 RX ADMIN — BUPIVACAINE HYDROCHLORIDE IN DEXTROSE 1.8 ML: 7.5 INJECTION, SOLUTION SUBARACHNOID at 10:12

## 2023-05-11 RX ADMIN — ACETAMINOPHEN 975 MG: 325 TABLET ORAL at 08:34

## 2023-05-11 RX ADMIN — Medication 5 MG: at 10:50

## 2023-05-11 RX ADMIN — ATORVASTATIN CALCIUM 10 MG: 10 TABLET, FILM COATED ORAL at 20:11

## 2023-05-11 RX ADMIN — SODIUM CHLORIDE: 9 INJECTION, SOLUTION INTRAVENOUS at 16:18

## 2023-05-11 RX ADMIN — ONDANSETRON 4 MG: 2 INJECTION INTRAMUSCULAR; INTRAVENOUS at 13:12

## 2023-05-11 RX ADMIN — Medication 2 G: at 10:15

## 2023-05-11 RX ADMIN — TRANEXAMIC ACID 1950 MG: 650 TABLET ORAL at 08:34

## 2023-05-11 RX ADMIN — PHENYLEPHRINE HYDROCHLORIDE 100 MCG: 10 INJECTION INTRAVENOUS at 11:31

## 2023-05-11 RX ADMIN — Medication 5 MG: at 11:00

## 2023-05-11 RX ADMIN — SODIUM CHLORIDE, POTASSIUM CHLORIDE, SODIUM LACTATE AND CALCIUM CHLORIDE: 600; 310; 30; 20 INJECTION, SOLUTION INTRAVENOUS at 10:01

## 2023-05-11 RX ADMIN — ACETAMINOPHEN 975 MG: 325 TABLET ORAL at 16:16

## 2023-05-11 RX ADMIN — MIDAZOLAM 2 MG: 1 INJECTION INTRAMUSCULAR; INTRAVENOUS at 10:01

## 2023-05-11 RX ADMIN — ASPIRIN 81 MG: 81 TABLET, COATED ORAL at 20:11

## 2023-05-11 RX ADMIN — PHENYLEPHRINE HYDROCHLORIDE 100 MCG: 10 INJECTION INTRAVENOUS at 11:14

## 2023-05-11 RX ADMIN — SODIUM CHLORIDE: 0.9 INJECTION, SOLUTION INTRAVENOUS at 16:18

## 2023-05-11 RX ADMIN — CELECOXIB 400 MG: 200 CAPSULE ORAL at 08:34

## 2023-05-11 RX ADMIN — PROPOFOL 100 MCG/KG/MIN: 10 INJECTION, EMULSION INTRAVENOUS at 10:17

## 2023-05-11 RX ADMIN — CEFAZOLIN SODIUM 2 G: 2 INJECTION, SOLUTION INTRAVENOUS at 18:19

## 2023-05-11 RX ADMIN — PHENYLEPHRINE HYDROCHLORIDE 100 MCG: 10 INJECTION INTRAVENOUS at 11:24

## 2023-05-11 RX ADMIN — Medication 5 MG: at 11:07

## 2023-05-11 ASSESSMENT — ACTIVITIES OF DAILY LIVING (ADL)
ADLS_ACUITY_SCORE: 24
ADLS_ACUITY_SCORE: 20
ADLS_ACUITY_SCORE: 18
ADLS_ACUITY_SCORE: 24
ADLS_ACUITY_SCORE: 24
ADLS_ACUITY_SCORE: 18
ADLS_ACUITY_SCORE: 20
ADLS_ACUITY_SCORE: 18

## 2023-05-11 NOTE — BRIEF OP NOTE
Orthopaedic Surgery Brief Op-Note      Patient: Stephane Shaikh  : 1955  Date of Service: 2023     Pre-operative Diagnosis: Arthritis of right knee [M17.11]  Post-operative Diagnosis: same    Procedure(s) Performed: Procedure(s):  Total Knee Arthroplasty Right  Inject steroid knee    Staff: Dr. Tariq  Resident: José Miguel Robertson, PGY-4  Assistants:   Ana Kingsley PA-C    Anesthesia: General  EBL: 50 cc  UOP: see anesthesia record  Tourniquet Time: 120 minutes at 250 mmHg    Implants:   Implant Name Type Inv. Item Serial No.  Lot No. LRB No. Used Action   BONE CEMENT STRK SIMPLEX P SPEEDSET 6192-1-001 - [id number] Cement, Bone BONE CEMENT STRK SIMPLEX P SPEEDSET 6192-1-001  [employer] [id number] Right 2 Implanted   IMP COMP PATELLA HOWM TRI 35 10MM 5551-L-350 - [id number] Total Joint Component/Insert IMP COMP PATELLA HOWM TRI 35 10MM 5551-L-350  [employer] [id number] Right 1 Implanted   IMP BASEPLATE TIBIAL HOWM TRI 6 5520-B-600 - [id number] Total Joint Component/Insert IMP BASEPLATE TIBIAL HOWM TRI 6 5520-B-600  [employer] [id number] Right 1 Implanted   IMP COMP FEM STRK TRIATHLN PS RT 6 5515-F-602 - [id number] Total Joint Component/Insert IMP COMP FEM STRK TRIATHLN PS RT 6 5515-F-602  [employer] [id number] Right 1 Implanted   IMP INSERT TIBIAL STRK TRI SIZE 6 10MM 9722-L-579-E - [id number] Total Joint Component/Insert IMP INSERT TIBIAL STRK TRI SIZE 6 10MM 0397-W-[id number]  [employer] [id number] Right 1 Implanted       Intra-op Labs/Cxs/Specimens: None  Complications: No apparent complications during procedure  Findings: Please see dictated operative note for details    Disposition: Stable to PACU, then admit to Orthopaedics.    Post-Op Plan:  Assessment/Plan: Stephane Shaikh is a 68 year old male s/p Procedure(s):  Total Knee Arthroplasty Right  Inject steroid knee on 2023 with Dr. Tariq.    Activity: Up with assist and assistive devices as needed until  independent. Knee ROM as tolerated. Advance CPM as tolerated to goal of 0 to 90 degrees.  Weight bearing status: WBAT    Antibiotics: Cefazolin x 24 hours  Diet: Begin with clear fluids and progress diet as tolerated. Bowel regimen. Anti-emetics PRN.    DVT prophylaxis: Aspirin 162mg daily x 4 weeks, starting morning of POD 1  Elevation: Elevate heels off of bed on pillows, no pillows behind the knee at any time    Wound Care: No dressing change until POD #10  Pain management: Orals PRN, IV for breakthrough only  X-rays: AP/Lat operative knee XR in PACU  Physical Therapy: Mobilization, ROM, ADL's  Occupational Therapy: ADL's  Labs: Trend Hgb on PODs #1 & 2  Cultures: None  Consults: PT, OT. Hospitalist, appreciate assistance in caring for this patient throughout the perioperative period    Future Appointments   Date Time Provider Department Center   5/12/2023  9:00 AM Alyse Escobar, PT [facility] [place]   5/12/2023 11:15 AM Erica Brooks, OTR [facility] [place]   5/12/2023  3:00 PM Libia Siddiqi, PT [facility] [place]   6/9/2023  4:00 PM Ana Kingsley PA-C [facility]   7/18/2023 10:40 AM Lorena Tariq MD [facility]   10/17/2023  1:00 PM Lorena Tariq MD [facility]       Disposition: Pending progress with therapies, pain control on orals, and medical stability, anticipate discharge to Home on POD #1    I assisted with positioning, prepping and draping, and closure.      Ana Kingsley PA-C 5/11/2023  Orthopaedic Surgery

## 2023-05-11 NOTE — ANESTHESIA PROCEDURE NOTES
"Intrathecal injection Procedure Note    Pre-Procedure   Staff -        Anesthesiologist:  Cecily Cleveland MD       Performed By: anesthesiologist       Location: OR       Pre-Anesthestic Checklist: patient identified, risks and benefits discussed, informed consent, monitors and equipment checked and pre-op evaluation  Timeout:       Correct Patient: Yes        Correct Procedure: Yes        Correct Site: Yes        Correct Position: Yes   Procedure Documentation  Procedure: intrathecal injection       Diagnosis: operative pain management for left total knee       Patient Position: sitting       Skin prep: Chloraprep       Insertion Site: L3-4. (midline approach).       Needle Gauge: 25.        Needle Length (Inches): 3.5        Spinal Needle Type: Pencan       Introducer used       # of attempts: 1 and  # of redirects:  0    Assessment/Narrative         Paresthesias: No.       Sensory Level: T10       CSF fluid: clear.    Medication(s) Administered   0.75% Hyperbaric Bupivacaine (Intrathecal) - Intrathecal   1.8 mL - 5/11/2023 10:12:00 AM    FOR [facility] ([facility]) ONLY:   Pain Team Contact information: please page the Pain Team Via [employer]. Search \"Pain\". During daytime hours, please page the attending first. At night please page the resident first.      "

## 2023-05-11 NOTE — PROGRESS NOTES
"  VS: /60   Pulse 74   Temp (!) 96.1  F (35.6  C)   Resp 16   Ht 1.778 m (5' 10\")   Wt 112.4 kg (247 lb 12.8 oz)   SpO2 95%   BMI 35.56 kg/m     O2: Room air saturations 95%. Pt completing IS to level of 2300.    Output: Awaiting for first void after surgery.    Last BM:    Activity: Pt was started on CPM to level of 40 flex. Pt was taught about use of CPM.    Skin: Right knee dressing is CDI.    Pain: Pt denies pain at this time.    CMS: Pt states\" I am still having a lot of numbness in right leg. \"    Dressing: Right knee dressing is CDI   Diet: Regular. Pt was brought menu. Pt is going to order food.    LDA: IV is infusing   Equipment: CPM, IS, capnography( Pt declined)    Plan: Pt is hoping to discharge to home with daughter tomorrow.    Additional Info: Pt has been taught about medications and plans of cares for postop recovery. Pt is currently resting quite comfortably.        "

## 2023-05-11 NOTE — ANESTHESIA POSTPROCEDURE EVALUATION
Patient: Stephane Shaikh    Procedure: Procedure(s):  Total Knee Arthroplasty Right  Inject steroid knee       Anesthesia Type:  Spinal    Note:  Disposition: Outpatient   Postop Pain Control: Uneventful            Sign Out: Well controlled pain   PONV: No   Neuro/Psych: Uneventful            Sign Out: Acceptable/Baseline neuro status (patient is able to start moving legs post spinal)   Airway/Respiratory: Uneventful            Sign Out: Acceptable/Baseline resp. status   CV/Hemodynamics: Uneventful            Sign Out: Acceptable CV status; No obvious hypovolemia; No obvious fluid overload   Other NRE: NONE   DID A NON-ROUTINE EVENT OCCUR? No           Last vitals:  Vitals Value Taken Time   /64 05/11/23 1330   Temp 36.4  C (97.5  F) 05/11/23 1316   Pulse 69 05/11/23 1333   Resp 19 05/11/23 1333   SpO2 94 % 05/11/23 1333   Vitals shown include unvalidated device data.    Electronically Signed By: Cecily Cleveland MD  May 11, 2023  1:34 PM

## 2023-05-11 NOTE — OR NURSING
PACU to Inpatient Nursing Handoff    Patient Stephane Shaikh is a 68 year old male who speaks English.   Procedure Procedure(s):  Total Knee Arthroplasty Right  Inject steroid knee   Surgeon(s) Primary: Lorena Tariq MD  Assisting: Ana Kingsley PA-C  Resident - Assisting: Tommy Robertson MD     No Known Allergies    Isolation  [unfilled]     Past Medical History   has a past medical history of Acoustic neuroma (H) (06/14/2016), Heart murmur, History of vasectomy (1996), tonsillectomy, Obesity, Class III, BMI 40-49.9 (morbid obesity) (H) (06/23/2014), and Rheumatic fever.    Anesthesia Spinal   Dermatome Level Dermatomes Left: L3  Dermatomes Right: L3   Preop Meds acetaminophen (Tylenol) - time given: 0830  celecoxib (Celebrex) - time given: 0834   Nerve block Not applicable   Intraop Meds Not applicable   Local Meds Yes - Local Cocktail (morphine, ropivacaine, epinephrine, Toradol)   Antibiotics cefazolin (Ancef) - last given at 1015     Pain Patient Currently in Pain: no   PACU meds  oxycodone (Roxicodone): 5 mg (total dose) last given at 1351    PCA / epidural No   Capnography     Telemetry ECG Rhythm: Normal sinus rhythm   Inpatient Telemetry Monitor Ordered? No        Labs Glucose Lab Results   Component Value Date     05/11/2023     07/22/2016       Hgb Lab Results   Component Value Date    HGB 14.5 05/02/2023    HGB 15.4 06/14/2016       INR No results found for: INR   PACU Imaging Completed     Wound/Incision Incision/Surgical Site 05/11/23 Right;Anterior Knee (Active)   Incision Assessment UTV 05/11/23 1316   Closure Adhesive strip(s);Approximated;Liquid bandage;Sutures 05/11/23 1313   Incision Drainage Amount None 05/11/23 1316   Dressing Intervention Clean, dry, intact 05/11/23 1316   Number of days: 0       Incision/Surgical Site 05/11/23 Left Knee (Active)   Incision Drainage Amount None 05/11/23 1316   Number of days: 0      CMS        Equipment ice pack   Other LDA       IV  Access Peripheral IV 05/11/23 Left;Posterior Hand (Active)   Number of days: 0      Blood Products Not applicable EBL 0 mL   Intake/Output Date 05/11/23 0700 - 05/12/23 0659   Shift 6271-1531 5912-5253 3319-6394 24 Hour Total   INTAKE   I.V. 1100   1100   Shift Total(mL/kg) 1100(9.79)   1100(9.79)   OUTPUT   Shift Total(mL/kg)       Weight (kg) 112.4 112.4 112.4 112.4      Drains / [place]     Time of void PreOp Time of Void Prior to Procedure: 0620 (05/11/23 0827)    PostOp      Diapered? No   Bladder Scan Bladder Scan Volume (mL): 70 ml (05/11/23 1330)   PO    tolerating sips     Vitals    B/P: 109/64  T: 97.5  F (36.4  C)    Temp src: Oral  P:  Pulse: 71 (05/11/23 1330)          R: 28  O2:  SpO2: 92 %    O2 Device: None (Room air) (05/11/23 1316)              Family/support present not here   Patient belongings     Patient transported on cart and air mat   DC meds/scripts (obs/outpt) Not applicable   Inpatient Pain Meds Released? Yes       Special needs/considerations None   Tasks needing completion None       Pamela Bro RN  ASCOM [id number]

## 2023-05-11 NOTE — CONSULTS
"[facility]  Consult Note - Hospitalist Service, GOLD TEAM   Date of Admission:  5/11/2023  Consult Requested by: Lorena Tariq MD  Reason for Consult: Perioperative Medical Comanagement    Assessment & Plan   Stephane Shaikh is a 68 year old man with a history of obesity, HTN, HLD, rheumatic fever, and bilateral knee OA who is admitted s/p right TKA and left knee intraarticular steroid injection.     #Bilateral Knee OA s/p R TKA and L Knee Intraarticular Steroid Injection. No complications noted.   - Management per primary team.   - DVT proph is w/ ASA 81 mg BID per primary team.   - Pain control w/ scheduled Tylenol, PRN oxycodone/IV Dilaudid, PRN Robaxin per primary team.     #HLD. Controlled. Resumed home statin.     #HTN. vs. white coat HTN by patient report. Currently normotensive. Not on PTA meds. Sinus priyanka w/ 1st deg AVB on pre op EKG.   - Monitor.       Clinically Significant Risk Factors Present on Admission                # Drug Induced Platelet Defect: home medication list includes an antiplatelet medication        # Obesity: Estimated body mass index is 35.56 kg/m  as calculated from the following:    Height as of this encounter: 1.778 m (5' 10\").    Weight as of this encounter: 112.4 kg (247 lb 12.8 oz).            JOSEPHINE Rendon  Hospitalist Service, GOLD TEAM   Securely message with [employer] info)  Text page via [facility] Paging/Directory   See signed in provider for up to date coverage information  ______________________________________________________________________    Chief Complaint   S/p R TKA    History is obtained from the patient. Pre op primary care visit also reviewed.     History of Present Illness   Stephane Shaikh is a 68 year old man who is admitted after right TKA and left knee steroid injection today with Dr. Tariq. He is currently feeling well. Right leg starting to \"tingle\" more as anesthesia wears off but denies significant pain. " Tolerated PO intake without N/V. Hoping to go home tomorrow - will go to his daughter's for several days [place] they are currently living in their 5th [address] (has steps) while their home gets renovated. No chest pain or SOB. Has not voided or passed flatus yet.     Past Medical History    Past Medical History:   Diagnosis Date     Acoustic neuroma (H) 06/14/2016    left      Benign essential hypertension      Heart murmur      Hyperlipidemia      Obesity, Class III, BMI 40-49.9 (morbid obesity) (H) 06/23/2014     Osteoarthritis      Rheumatic fever        Past Surgical History   Past Surgical History:   Procedure Laterality Date     ARTHROSCOPY KNEE Left     approx 1990     ARTHROSCOPY KNEE RT/LT Right remote     ARTHROSCOPY SHOULDER RT/LT Right 01/01/2011    rotator cuff repair     TONSILLECTOMY       VASECTOMY         Medications     Medications Prior to Admission   Medication Sig Dispense Refill Last Dose     aspirin 81 MG EC tablet Take 81 mg by mouth daily   5/3/2023     glucosamine 500 MG CAPS capsule Take 1,000 mg by mouth daily   5/3/2023     Melatonin 10 MG TABS tablet Take 10 mg by mouth nightly as needed for sleep   5/3/2023     multivitamin w/minerals (MULTI-VITAMIN) tablet Take 1 tablet by mouth daily   5/3/2023     Omega-3 Fatty Acids (FISH OIL) 1200 MG capsule Take 1,200 mg by mouth daily   5/3/2023     acetaminophen (TYLENOL) 500 MG tablet Take 500-1,000 mg by mouth every 6 hours as needed for mild pain   5/9/2023 at 0900     atorvastatin (LIPITOR) 10 MG tablet Take 1 tablet (10 mg) by mouth daily 90 tablet 3 5/3/2023        Physical Exam   Vital Signs: Temp: (!) 95.4  F (35.2  C) Temp src: Oral BP: 126/71 Pulse: 71   Resp: 16 SpO2: 95 % O2 Device: None (Room air)    Weight: 247 lbs 12.75 oz    GENERAL: Alert and oriented x 3. Lying in bed, appears comfortable. Pleasant and conversant.   HEENT: Anicteric sclera. Mucous membranes moist.   CV: RRR. S1, S2. No  murmurs appreciated.   RESPIRATORY: Effort normal on room air. Lungs CTAB with no wheezing, rales, rhonchi.   GI: Abdomen soft, non distended, non tender.   NEUROLOGICAL: No focal deficits. Moves all extremities.   EXTREMITIES: No peripheral edema. Warm and well perfused. Right knee in CPM.   SKIN: No jaundice. No rashes.       Medical Decision Making             Data   Reviewed 5/2/23 EKG, CMP, CBC, lipid panel

## 2023-05-11 NOTE — INTERVAL H&P NOTE
"I have reviewed the surgical (or preoperative) H&P that is linked to this encounter, and examined the patient. There are no significant changes    Clinical Conditions Present on Arrival:  Clinically Significant Risk Factors Present on Admission                  # Obesity: Estimated body mass index is 35.51 kg/m  as calculated from the following:    Height as of 5/2/23: 1.791 m (5' 10.5\").    Weight as of 5/2/23: 113.9 kg (251 lb).       "

## 2023-05-11 NOTE — ANESTHESIA CARE TRANSFER NOTE
Patient: Stephane Shaikh    Procedure: Procedure(s):  Total Knee Arthroplasty Right  Inject steroid knee       Diagnosis: Arthritis of right knee [M17.11]  Diagnosis Additional Information: No value filed.    Anesthesia Type:   Spinal     Note:    Oropharynx: oropharynx clear of all foreign objects and spontaneously breathing  Level of Consciousness: awake  Oxygen Supplementation: room air    Independent Airway: airway patency satisfactory and stable  Dentition: dentition unchanged  Vital Signs Stable: post-procedure vital signs reviewed and stable  Report to RN Given: handoff report given  Patient transferred to: PACU    Handoff Report: Identifed the Patient, Identified the Reponsible Provider, Reviewed the pertinent medical history, Discussed the surgical course, Reviewed Intra-OP anesthesia mangement and issues during anesthesia, Set expectations for post-procedure period and Allowed opportunity for questions and acknowledgement of understanding      Vitals:  Vitals Value Taken Time   /69 05/11/23 1316   Temp 36.4  C (97.5  F) 05/11/23 1316   Pulse 72 05/11/23 1322   Resp 20 05/11/23 1322   SpO2 93 % 05/11/23 1322   Vitals shown include unvalidated device data.    Electronically Signed By: HEMANTH Gaytan CRNA  May 11, 2023  1:24 PM

## 2023-05-11 NOTE — OP NOTE
PREOPERATIVE DIAGNOSES: Right  Knee Osteoarthritis           Genu Varum (11 degrees), no correctable            BMI 37           Mild Flexion Contracture          Left knee medial compartment osteoarthritis      POSTOPERATIVE DIAGNOSES:  Same       PROCEDURES:     Total knee arthoplasty right  Left knee intra -articular knee injection (Di)        COMPONENTS USED:  [employer] System,   #6 PS femur,#6 tibia, 10mm PS  tibial insert, A35x11 mm patella       OPERATORS:  Lorena Tariq MD     ASSISTANTS: José Miguel Robertson MD; Gerard Kingsley PA-C     ANESTHESIA:  Spinal Anesthesia supplemented with Haritha-articular Injection    Exam under anesthesia revealed range of motion 8/0/122 degrees       OPERATIVE FINDINGS: Grade 4 MFC, grade 4MTP, grade 2  LFC, grade 1 LTP, grade 2-3 patella based medially, grade2-3  Trochlea based medially     DESCRIPTION OF PROCEDURE:  Under Spinal Anesthesia, the patient was positioned supine on the operating room table.   The patient's leg was prepped and draped in usual sterile fashion.  A pause was performed   identifying the correct leg and administration of antibiotics.      A tourniquet, which was on the upper aspect of the patient's leg was elevated   to 250 mmHg after Esmarch exsanguination of the leg.  A midline incision   was used to enter the knee.  A vastus splitting incision was used to enter the joint.    Findings as above.      The procedure began with soft tissue balancing by  a  Large  release of the  medial side including the distal  MCL.    We used an intramedullary femoral guide set at 6 degrees of anatomic valgus and making a distal cut of 8 mm.  We then made  the appropriate anterior and posterior cuts to fit a #6 femur.      We then turned our attention to the tibia.  We used an extramedullary guide set at neutral  and made a minimal cut on the bone on the medial tibial side.  We used the tensioning device to evaluate balance between the collaterals  and between flexion and extension and felt that we  were tight in flexion.  We therefore sacrificed the PCL.  We re-evaluated balance using the tensioning device. We felt we had good balance between the collaterals and between flexion and extension.    We made the appropriate box and chamfer cuts on the femur to fit it for a PS femur.     We then returned to the tibia and fit the tibia to a #6 tibia base plate.      We then turned attention to the patella.  Total thickness was 20-27 mm.  We   removed bone to a 16 mm thickness and put an asymmetric patella button  for resulting thickness of 27 mm.  We did a minimal jasmyne-retinacular release.    With all components in place, we did a trial  range of motion and felt we had good balance, good tracking of the patella through a passive range of motion.      We then removed the trial prosthesis, brought the permanent prosthesis in the room, pulse lavaged the knee with antibiotic irrigation.  Cemented all components at once in the following order; tibia, femur, patella.  Excess cement was removed both during and after the hardening process.  For the bulk of the hardening process, the leg was held in full extension with a trial insert in place.  Once we verified that the trial insert was the   correct size, we put the permanent prosthesis in place.      We then pulse lavaged the knee again with antibiotic irrigation.  A drain was placed deep to the wound.  The subvastus and superficial vastus fascia was  closed with a running #1 Vicryl, 2-0 Ethibond sutures followed by #1 Vicryl, closed the extensor mechanism proper.  Subcutaneous tissue was closed with 2-0 Vicryl.  The skin was closed with a running Monocryl. Exofin wound closure system, sterile dressing and a Tubigrip stockinette was put in place.      Tourniquet was let down after closure of the extensor mechanism.  Total tourniquet time was 120 minutes.    The patient tolerated the procedure well and was taken to the  recovery room in satisfactory condition.     The patient will be maintained weightbearing as tolerated.  The patient will use a Active Ice and a CPM postoperatively.     At three distinct times during the case the knee was injected with a pre-mixed cocktail of epimorphine, toradol, and ropivicaine.  This was done in the posterior knee before cementing, in the retinacular tissues while the cement was hardening, and in the subcutaneious space prior to closure.    After completion of the right knee arthroplasty, we injected the left knee with 9 cc of quarter percent ropivacaine and 1 cc of Kenalog 40 mg/mL.  This was done under sterile conditions.    JOSEPHINE Mayorga was a necessary component of this case for tissue retraction and limb positioning.  There was a resident learner available for the case, but a second set of hands was necessary  to  help manage the limb in the OR, help with tissue retraction to ensure maximum exposure and maximum surgical efficiency, both which promotes best practice and best surgical outcomes.  A PA was an essential part of this case.        MARION WOO MD          5/11/2023     Stephane Shaikh   MRN# [id number]                             YOB: 1955

## 2023-05-11 NOTE — ANESTHESIA PREPROCEDURE EVALUATION
Anesthesia Pre-Procedure Evaluation    Patient: Stephane Shaikh   MRN: [id number] : 1955        Procedure : Procedure(s):  Total Knee Arthroplasty Right          Past Medical History:   Diagnosis Date     Acoustic neuroma (H) 2016    left      Heart murmur      History of vasectomy      Hx of tonsillectomy      Obesity, Class III, BMI 40-49.9 (morbid obesity) (H) 2014     Rheumatic fever       Past Surgical History:   Procedure Laterality Date     ARTHROSCOPY KNEE Left     approx      ARTHROSCOPY KNEE RT/LT Right remote     ARTHROSCOPY SHOULDER RT/LT Right 2011    rotator cuff repair      No Known Allergies   Social History     Tobacco Use     Smoking status: Never     Smokeless tobacco: Never   Vaping Use     Vaping status: Not on file   Substance Use Topics     Alcohol use: Yes     Alcohol/week: 0.0 - 1.0 standard drinks of alcohol      Wt Readings from Last 1 Encounters:   23 112.4 kg (247 lb 12.8 oz)        Anesthesia Evaluation   Pt has had prior anesthetic.         ROS/MED HX  ENT/Pulmonary:     (+) FÁTIMA risk factors, hypertension, obese,     Neurologic:  - neg neurologic ROS     Cardiovascular: Comment: Patient doesn't have a heart murmer    (+) Dyslipidemia hypertension-range: 138/83/ ----    METS/Exercise Tolerance:     Hematologic:  - neg hematologic  ROS     Musculoskeletal:   (+) arthritis,     GI/Hepatic:  - neg GI/hepatic ROS     Renal/Genitourinary:  - neg Renal ROS     Endo:  - neg endo ROS   (+) Obesity,     Psychiatric/Substance Use:  - neg psychiatric ROS     Infectious Disease:  - neg infectious disease ROS     Malignancy:  - neg malignancy ROS     Other:  - neg other ROS          Physical Exam    Airway        Mallampati: I   TM distance: > 3 FB   Neck ROM: full   Mouth opening: > 3 cm    Respiratory Devices and Support         Dental         B=Bridge, C=Chipped, L=Loose, M=Missing    Cardiovascular   cardiovascular exam normal       Rhythm and rate: regular  and normal     Pulmonary   pulmonary exam normal                OUTSIDE LABS:  CBC:   Lab Results   Component Value Date    WBC 7.0 05/02/2023    WBC 6.5 06/14/2016    HGB 14.5 05/02/2023    HGB 15.4 06/14/2016    HCT 43.3 05/02/2023    HCT 45.3 06/14/2016     05/02/2023     06/14/2016     BMP:   Lab Results   Component Value Date     05/02/2023     06/14/2016    POTASSIUM 4.0 05/02/2023    POTASSIUM 4.2 06/14/2016    CHLORIDE 103 05/02/2023    CHLORIDE 107 06/14/2016    CO2 28 05/02/2023    CO2 21 06/14/2016    BUN 12.3 05/02/2023    BUN 15 06/14/2016    CR 1.06 05/02/2023    CR 0.87 06/14/2016     (H) 05/11/2023     (H) 05/02/2023     COAGS: No results found for: PTT, INR, FIBR  POC: No results found for: BGM, HCG, HCGS  HEPATIC:   Lab Results   Component Value Date    ALBUMIN 4.4 05/02/2023    PROTTOTAL 7.8 05/02/2023    ALT 22 05/02/2023    AST 29 05/02/2023    ALKPHOS 84 05/02/2023    BILITOTAL 1.0 05/02/2023     OTHER:   Lab Results   Component Value Date    A1C 5.2 07/22/2016    FARIDEH 9.8 05/02/2023    TSH 2.56 05/02/2023       Anesthesia Plan    ASA Status:  2      Anesthesia Type: Spinal.      Maintenance: TIVA.        Consents    Anesthesia Plan(s) and associated risks, benefits, and realistic alternatives discussed. Questions answered and patient/representative(s) expressed understanding.     - Discussed: Risks, Benefits and Alternatives for BOTH SEDATION and the PROCEDURE were discussed     - Discussed with:  Patient      - Extended Intubation/Ventilatory Support Discussed: No.      - Patient is DNR/DNI Status: No    Use of blood products discussed: No .     Postoperative Care    Pain management: IV analgesics, Oral pain medications.   PONV prophylaxis: Ondansetron (or other 5HT-3), Dexamethasone or Solumedrol     Comments:                Cecily Cleveland MD

## 2023-05-12 ENCOUNTER — APPOINTMENT (OUTPATIENT)
Dept: PHYSICAL THERAPY | Facility: CLINIC | Age: 68
End: 2023-05-12
Attending: ORTHOPAEDIC SURGERY
Payer: MEDICARE

## 2023-05-12 VITALS
SYSTOLIC BLOOD PRESSURE: 147 MMHG | WEIGHT: 247.8 LBS | HEIGHT: 70 IN | BODY MASS INDEX: 35.48 KG/M2 | HEART RATE: 62 BPM | OXYGEN SATURATION: 93 % | DIASTOLIC BLOOD PRESSURE: 74 MMHG | TEMPERATURE: 96.8 F | RESPIRATION RATE: 16 BRPM

## 2023-05-12 LAB
GLUCOSE BLDC GLUCOMTR-MCNC: 131 MG/DL (ref 70–99)
HGB BLD-MCNC: 12.4 G/DL (ref 13.3–17.7)
HOLD SPECIMEN: NORMAL

## 2023-05-12 PROCEDURE — 250N000011 HC RX IP 250 OP 636: Performed by: PHYSICIAN ASSISTANT

## 2023-05-12 PROCEDURE — 97116 GAIT TRAINING THERAPY: CPT | Mod: GP

## 2023-05-12 PROCEDURE — 99213 OFFICE O/P EST LOW 20 MIN: CPT | Performed by: INTERNAL MEDICINE

## 2023-05-12 PROCEDURE — 250N000013 HC RX MED GY IP 250 OP 250 PS 637: Performed by: PHYSICIAN ASSISTANT

## 2023-05-12 PROCEDURE — 85018 HEMOGLOBIN: CPT | Performed by: PHYSICIAN ASSISTANT

## 2023-05-12 PROCEDURE — 999N000111 HC STATISTIC OT IP EVAL DEFER

## 2023-05-12 PROCEDURE — 97161 PT EVAL LOW COMPLEX 20 MIN: CPT | Mod: GP

## 2023-05-12 PROCEDURE — 82962 GLUCOSE BLOOD TEST: CPT

## 2023-05-12 PROCEDURE — 97110 THERAPEUTIC EXERCISES: CPT | Mod: GP

## 2023-05-12 PROCEDURE — 36415 COLL VENOUS BLD VENIPUNCTURE: CPT | Performed by: PHYSICIAN ASSISTANT

## 2023-05-12 RX ADMIN — ACETAMINOPHEN 975 MG: 325 TABLET ORAL at 00:19

## 2023-05-12 RX ADMIN — ACETAMINOPHEN 975 MG: 325 TABLET ORAL at 08:08

## 2023-05-12 RX ADMIN — ASPIRIN 81 MG: 81 TABLET, COATED ORAL at 08:08

## 2023-05-12 RX ADMIN — CEFAZOLIN SODIUM 2 G: 2 INJECTION, SOLUTION INTRAVENOUS at 02:06

## 2023-05-12 ASSESSMENT — ACTIVITIES OF DAILY LIVING (ADL)
ADLS_ACUITY_SCORE: 24
ADLS_ACUITY_SCORE: 24
ADLS_ACUITY_SCORE: 25
ADLS_ACUITY_SCORE: 25
ADLS_ACUITY_SCORE: 24
ADLS_ACUITY_SCORE: 24

## 2023-05-12 NOTE — PLAN OF CARE
"Goal Outcome Evaluation:      Plan of Care Reviewed With: patient    Overall Patient Progress: improving    VS: VSS. Denies CP/SOB. A/O x4.   O2: >90% on RA    Output: Voiding adequate amounts w/o pain or difficulty    Last BM: 5/11 per pt report    Activity: Up with 1 assist, GB and crutches    Skin: R knee surgical incision    Pain: Minimal pain in RLE, managing with scheduled tylenol. Ice packs   CMS: Numbness/tingling in RLE improving    Dressing: CDI   Diet: Regular, tolerating well.    LDA: PIV removed before discharge    Equipment: IV pole, CPM, personal belongings    Additional Info:      DISCHARGE SUMMARY    Pt discharging to: Home  Transportation: Family, car  AVS given and discussed: Yes, no further questions.   Stoplight Tool given and discussed: Yes, no further questions.  Medications given: Yes, discussed. No further questions. Pt declined to take home aspirin- stated he already has a bottle at home with exact dosage. Pt also declined to take home stool softeners stating \"he does not need them\", educated that they are over the counter if needed and encouraged other bowel elimination measures.    Belongings returned: Yes, ensured all belongings packed and sent with pt. No items in security.   Comments: Escorted safely to elevators.     "

## 2023-05-12 NOTE — PROGRESS NOTES
"[facility]    Medicine Progress Note - Hospitalist Service, GOLD TEAM 18    Date of Admission:  5/11/2023    Assessment & Plan      Stephane Shaikh is a 68 year old man with a history of obesity, HTN, HLD, rheumatic fever, and bilateral knee OA who is admitted s/p right TKA and left knee intraarticular steroid injection.      #Bilateral Knee OA s/p R TKA and L Knee Intraarticular Steroid Injection. No complications noted.   - Management per primary team.   - DVT proph is w/ ASA 81 mg BID per primary team.   - Pain control w/ scheduled Tylenol, PRN oxycodone/IV Dilaudid, PRN Robaxin per primary team.      #HLD. Controlled. Resumed home statin.      #HTN. vs. white coat HTN by patient report. Currently normotensive. Not on PTA meds. Sinus priyanka w/ 1st deg AVB on pre op EKG.   - Monitor.               Diet: Advance Diet as Tolerated: Regular Diet Adult  Discharge Instruction - Regular Diet Adult    DVT Prophylaxis: Defer to primary service  [place] Catheter: Not present  Lines: None     Cardiac Monitoring: None  Code Status: Full Code      Clinically Significant Risk Factors Present on Admission                # Drug Induced Platelet Defect: home medication list includes an antiplatelet medication        # Obesity: Estimated body mass index is 35.56 kg/m  as calculated from the following:    Height as of this encounter: 1.778 m (5' 10\").    Weight as of this encounter: 112.4 kg (247 lb 12.8 oz).            Disposition Plan      Expected Discharge Date: 05/12/2023                  Kelly Gama MD  Hospitalist Service, GOLD TEAM 18  [facility]  Securely message with [employer] (more info)  Text page via [employer] Paging/Directory   See signed in provider for up to date coverage information  ______________________________________________________________________    Interval History   Feeling ok, rates pain 3 , no chest pain  No shortness of " breath      Physical Exam   Vital Signs: Temp: 96.8  F (36  C) Temp src: Oral BP: (!) 147/74 Pulse: 62   Resp: 16 SpO2: 93 % O2 Device: None (Room air)    Weight: 247 lbs 12.75 oz  General appearance: awake alert in  no apparent distress     HEENT: EOMI and PEARLA, sclera nonicteric, moist mucus membranes, head atraumatic  NECK: supple  RESPIRATORY: lungs are clear   CARDIOVASCULAR: normal S1S2 regular rate and rhythm,  GASTROINTESTINAL: abdomen is soft,  non-distended, non-tender with normal bowel sounds.  MUSCULOSKELETAL: status post  Right knee surgery   SKIN: warm and dry, no rashes, no mottling noted   NEUROLOGIC: awake alert and oriented  EXTREMITIES: no clubbing cyanosis or edema noted  moves all extremities     Data         Imaging results reviewed over the past 24 hrs:   Recent Results (from the past 24 hour(s))   XR Knee Port Right 1/2 Views    Narrative    EXAM: XR KNEE PORT RIGHT 1/2 VIEWS  LOCATION: [facility]  DATE/TIME: 5/11/2023 2:02 PM CDT    INDICATION: Post Op Total Knee  COMPARISON: 05/02/2023.      Impression    IMPRESSION: Status post recent right arthroplasty. No immediate hardware complication. Expected postsurgical soft tissue edema, subcutaneous emphysema, and gas containing joint effusion. No acute fracture or malalignment. Vascular calcifications.

## 2023-05-12 NOTE — PLAN OF CARE
Goal Outcome Evaluation:         VS: VSS   O2: RA >90%  Denies SOB and CP, no cough present. Lung sounds clear.   Output: Voiding spontaneously and without difficulty   Last BM: 05/11/2023 prior to surgery per pt report   Activity: SBA w/ gait belt & walker  CPM on intermittently. Ind with this.   Ambulated to the bathroom and back    Skin: R- knee surgical incision   scab to R- hand   Pain: Pain on the low side at 3/10  Managed with scheduled tylenol   Neuro: AOx4, reports some numbness to RLE from spinal   Dressing: CDI, ace wrap in place   Diet: Regular   LDA: R- PIV infusing TKO in between IV abx   Equipment: Personal belongings, call light, walker, gait belt, IV pump/pole   Plan: Possible discharge today 5/12   Additional Info: Daughter will pick him up before 11am

## 2023-05-12 NOTE — PROGRESS NOTES
Occupational Therapy: Orders received. Chart reviewed and discussed with care team.? Occupational Therapy not indicated due to at ADL baseline and deferred by PT.? Defer discharge recommendations to ortho team.? Will complete orders.

## 2023-05-12 NOTE — PROGRESS NOTES
Orthopaedic Surgery Progress Note 05/12/2023    S: No acute events overnight.  Pain adequately controlled. Denies numbness or tingling in the affected extremity. chest pain (-), SOB(-), nausea/vomiting(-). Tolerating oral diet.   O:  Temp: 98.6  F (37  C) Temp src: Oral BP: 112/61 Pulse: 85   Resp: 18 SpO2: 92 % O2 Device: None (Room air)      Exam:  Gen: No acute distress, resting comfortably in bed.  Resp: Non-labored breathing  MSK:  LE:  - Dressings c/d/i  - SILT femoral/tibial/sural/saphenous/DP/SP nerves  - Fires Quad, TA, EHL, FHL, GaSC  - Extremity wwp    No lab results found in last 7 days.    Invalid input(s): SEDRATE  No results found for: SED    Assessment/Plan: Stephane Shaikh is a 68 year old male s/p Procedure(s):  Total Knee Arthroplasty Right  Inject steroid knee on 5/11/2023 with Dr. Tariq.     Activity: Up with assist and assistive devices as needed until independent. Knee ROM as tolerated. Advance CPM as tolerated to goal of 0 to 90 degrees.  Weight bearing status: WBAT    Antibiotics: Cefazolin x 24 hours  Diet: Begin with clear fluids and progress diet as tolerated. Bowel regimen. Anti-emetics PRN.    DVT prophylaxis: Aspirin 162mg daily x 4 weeks, starting morning of POD 1  Elevation: Elevate heels off of bed on pillows, no pillows behind the knee at any time    Wound Care: No dressing change until POD #10  Pain management: Orals PRN, IV for breakthrough only  X-rays: AP/Lat operative knee XR in PACU  Physical Therapy: Mobilization, ROM, ADL's  Occupational Therapy: ADL's  Labs: Trend Hgb on PODs #1 & 2  Cultures: None  Consults: PT, OT. Hospitalist, appreciate assistance in caring for this patient throughout the perioperative period            Future Appointments   Date Time Provider Department Center   5/12/2023  9:00 AM Alyse Escobar PT [facility]   5/12/2023 11:15 AM Erica Brooks, OTR [facility]   5/12/2023  3:00 PM Libia Siddiqi PT [facility]   6/9/2023  4:00 PM  Ana Kingsley PA-C [facility]   7/18/2023 10:40 AM Lorena Tariq MD [facility]   10/17/2023  1:00 PM Lorena Tariq MD [facility]

## 2023-05-12 NOTE — PLAN OF CARE
Physical Therapy Discharge Summary    Reason for therapy discharge:    All goals and outcomes met, no further needs identified.    Progress towards therapy goal(s). See goals on Care Plan in [facility] electronic health record for goal details.  Goals met    Therapy recommendation(s):    Continued therapy is recommended.  Rationale/Recommendations:  [place]

## 2023-05-12 NOTE — PROGRESS NOTES
[facility]  OUTPATIENT PHYSICAL THERAPY EVALUATION  PLAN OF TREATMENT FOR OUTPATIENT REHABILITATION  (COMPLETE FOR INITIAL CLAIMS ONLY)  Patient's Last Name, First Name, M.I.  YOB: 1955  Stephane Shaikh                        Provider's Name  [facility] Medical Record No.  [id number]                             Onset Date:  05/11/23   Start of Care Date:   5/12/23   Type:     _X_PT   ___OT   ___SLP Medical Diagnosis:                 PT Diagnosis:  Impaired functional mobility Visits from SOC:  1     See note for plan of treatment, functional goals and certification details    I CERTIFY THE NEED FOR THESE SERVICES FURNISHED UNDER        THIS PLAN OF TREATMENT AND WHILE UNDER MY CARE     (Physician co-signature of this document indicates review and certification of the therapy plan).              05/12/23 0800   Appointment Info   Signing Clinician's Name / Credentials (PT) Alyse Escobar DPT   Rehab Comments (PT) WBAT R LE   Living Environment   People in Home   (Wife, dtr, and dtr's SO on initial DC, will be home and able to help)   Current Living Arrangements   (Will DC to daughter's house, then [place]/ wife after a few days)   Home Accessibility stairs to enter home;stairs within home   Number of Stairs, Main Entrance   (Dtr's home: 3 ALFREDO B rails can stay one level; 5th wheel: 3 STR L rail, will not need more stairs once in can sleep on pull out couch or recliner)   Stair Railings, Main Entrance   (B rail dtr's and L rail 5th wheel)   Number of Stairs, Within Home, Primary   (Not needed)   Transportation Anticipated family or friend will provide   Living Environment Comments Daughter's home first, Monday will [place] to [place] w/ outhouse.   Self-Care   Usual Activity Tolerance good   Current Activity Tolerance good   Regular Exercise   (Pickle ball daily, no AD baseline)   Equipment Currently Used at  Home   (No AD accessible, would prefer crutches)   Fall history within last six months no   Activity/Exercise/Self-Care Comment Pt has been mobilizing very well w/ nursing   General Information   Onset of Illness/Injury or Date of Surgery 05/11/23   Referring Physician Ana Kingsley PA-C   Patient/Family Therapy Goals Statement (PT) To return to dtr's home this AM   Pertinent History of Current Problem (include personal factors and/or comorbidities that impact the POC) Stephane Shaikh is a 68 year old male s/p Procedure(s):  Total Knee Arthroplasty Right  Inject steroid knee on 5/11/2023 with Dr. Tariq.   Existing Precautions/Restrictions fall;weight bearing   Weight-Bearing Status - LUE full weight-bearing   Weight-Bearing Status - RUE full weight-bearing   Weight-Bearing Status - LLE full weight-bearing   Weight-Bearing Status - RLE weight-bearing as tolerated   General Observations Pt very eager to participate as he wishes to go home today, mobilizing very well   Cognition   Cognitive Status Comments WFL, follows cues, provides accurate subjective information   Pain Assessment   Patient Currently in Pain No   Integumentary/Edema   Integumentary/Edema   (Post-op dressing/ACE wrap to R LE)   Posture    Posture Forward head position;Protracted shoulders   Range of Motion (ROM)   Range of Motion ROM deficits secondary to surgical procedure  (Decreased R ROM, achieving ~60* flexion)   Strength (Manual Muscle Testing)   Strength (Manual Muscle Testing) Able to perform R SLR;No deficits observed during functional mobility   Bed Mobility   Comment, (Bed Mobility) Ind bed mob   Transfers   Comment, (Transfers) Ev transfers   Gait/Stairs (Locomotion)   Comment, (Gait/Stairs) Ev gait w/ B axillary crutches   Balance   Balance no deficits were identified   Sensory Examination   Sensory Perception   (Does still feel some numbness about R LE but is able to demonstrate motor control w/ mobility)   Clinical Impression    Criteria for Skilled Therapeutic Intervention Evaluation only  (1x evaluation and treatment)   PT Diagnosis (PT) Impaired functional mobility   Influenced by the following impairments post-op restrictions, requiring AD for mobility   Functional limitations due to impairments impaired transfers, amb, and stairs requiring use of AD   Clinical Presentation (PT Evaluation Complexity) Stable/Uncomplicated   Clinical Presentation Rationale expected post-op presentation   Clinical Decision Making (Complexity) low complexity   Planned Therapy Interventions (PT) bed mobility training;gait training;home exercise program;stair training;strengthening;stretching;transfer training   Anticipated Equipment Needs at Discharge (PT) crutches, axillary   Risk & Benefits of therapy have been explained evaluation/treatment results reviewed;care plan/treatment goals reviewed;risks/benefits reviewed;current/potential barriers reviewed;participants voiced agreement with care plan;participants included;patient   Clinical Impression Comments Pt mobilizing well and has good family support, is eager to DC today   PT Total Evaluation Time   PT Eval, Low Complexity Minutes (39715) 5   Plan of Care Review   Plan of Care Reviewed With patient   Physical Therapy Goals   PT Frequency One time eval and treatment only   PT Predicted Duration/Target Date for Goal Attainment 05/12/23   PT Goals Bed Mobility;Transfers;Gait;Stairs   PT: Bed Mobility Independent;Supine to/from sit;Rolling;Within precautions;Goal Met   PT: Transfers Modified independent;Sit to/from stand;Bed to/from chair;Assistive device;Within precautions;Goal Met   PT: Gait Modified independent;Within precautions;Greater than 200 feet;Goal Met;Assistive device;Crutches   PT: Stairs Supervision/stand-by assist;Within precautions;4 stairs;Rail on both sides;Goal Met   Interventions   Interventions Quick Adds Gait Training;Therapeutic Procedure   Therapeutic Procedure/Exercise   Ther.  Procedure: strength, endurance, ROM, flexibillity Minutes (90388) 13   Symptoms Noted During/After Treatment none   Treatment Detail/Skilled Intervention W/ pt in supine issued and completed HEP to R LE 10x each as follows: AP, QS, GS, HS, SAQ, SLR, heel slides into knee flex/ext (achieves ~60* flex), and heel slides into hip abd/add. Pt demonstrates good understanding and ind w/ HEP   Gait Training   Gait Training Minutes (01959) 23   Symptoms Noted During/After Treatment (Gait Training) none   Treatment Detail/Skilled Intervention Pt sup in bed on CPM on PT arrival, able to complete ind SLR to get off machine to participate w/ therapy. Completes sup>sit EOB ind. Requests to use crutches as this is what he prefers for home. Education provided on FWW vs. crutches. Pt has already been using FWW w/ nursing staff w/ no concerns. Brought in crutches and sized, pt able to use crutches and demonstrate safe STS w/ supervision. Pt amb in hallway for 400ft w/ use of crutches needing only intermittent cues for sequencing when he gets distracted w/ conversation. Between gait bout pt completes 4 stairs step to pattern w/ B rail support, able to verbalize correct sequencing and demonstrates good safety w/ just SBA. Pt has no concerns in regards to stairs or mobility. He walked back to room and completes SPT back to bed. Seated on EOB then completes sit>sup ind. Completed HEP then brought pt tray table and call light near.   PT Discharge Planning   PT Plan DC PT   PT Discharge Recommendation (DC Rec)   (Defer to ortho)   PT Rationale for DC Rec Defer to ortho, but pt met goals so anticipate home w/ assist and outpatient PT appropriate DC plan   PT Brief overview of current status Ind bed mob, Ev w/ crutches for transfers and amb   Total Session Time   Timed Code Treatment Minutes 36   Total Session Time (sum of timed and untimed services) 41

## 2023-05-16 NOTE — DISCHARGE SUMMARY
ORTHOPAEDIC SURGERY DISCHARGE SUMMARY     Date of Admission: 5/11/2023  Date of Discharge: 5/12/2023 12:00 PM  Disposition: Home  Staff Physician: No att. providers found  Primary Care Provider: No Ref-Primary, Physician    DISCHARGE DIAGNOSIS:  Arthritis of right knee [M17.11]    PROCEDURES: Procedure(s):  Total Knee Arthroplasty Right  Inject steroid knee  on 5/11/2023    BRIEF HISTORY:  A thorough pre-operative workup was conducted and the patient was indicated for the above stated procedure(s). The patient was extensively counseled on the risks, benefits, and alternatives of operative versus non-operative interventions to manage the above stated diagnoses, and elected to proceed with the above stated procedures. Written informed consent was obtained prior to the procedure.     HOSPITAL COURSE:    The patient was admitted following the above listed procedures for pain control and rehabilitation. Setphane Shaikh did well post-operatively. Medicine was consulted post operatively to aid in management of medical co-morbidities. The patient received routine nursing cares and at the time of discharge was medically stable. Vital signs were stable throughout admission. The patient is tolerating a regular diet and is voiding spontaneously. All PT/OT goals have been met for safe mobility. Pain is now controlled on oral medications which will be available on discharge. Stool softeners have been used while taking pain medications to help prevent constipation. Stephane Shaikh is deemed medically safe to discharge.     Antibiotics:  24 hours of perioperative antibiotics were administered.  PT Progress:  Has met PT/OT goals for safe mobility.  Pain Meds:  Weaned off all IV pain meds by discharge.  Inpatient Events: No significant events or complications.    PHYSICAL EXAM:    Please see daily progress note from day of discharge for complete discharging physical exam.     FOLLOWUP:    Follow up with Dr. Tariq at 2 weeks  postoperatively.    Future Appointments   Date Time Provider Department Center   6/9/2023  4:00 PM Ana Kingsley PA-C [facility]   7/18/2023 10:40 AM Lorena Tariq MD [facility]   10/17/2023  1:00 PM Lorena Tariq MD [facility]       Orthopaedic Surgery appointments are at the [facility] ([address] [place] [address]). Call [phone] to schedule a follow-up appointment at this location with your provider.     PLANNED DISCHARGE ORDERS:     Wound Care: see Below      Discharge Medication List as of 5/12/2023 10:54 AM      START taking these medications    Details   ondansetron (ZOFRAN ODT) 4 MG ODT tab Take 1-2 tablets (4-8 mg) by mouth every 8 hours as needed for nausea Dissolve ON the tongue., Disp-10 tablet, R-0, E-Prescribe      oxyCODONE (ROXICODONE) 5 MG tablet Take 1-2 tablets (5-10 mg) by mouth every 4 hours as needed for moderate to severe pain, Disp-26 tablet, R-0, E-Prescribe      polyethylene glycol (MIRALAX) 17 g packet Take 17 g by mouth daily, Disp-7 packet, R-0, E-Prescribe      senna-docusate (SENOKOT-S/PERICOLACE) 8.6-50 MG tablet Take 1-2 tablets by mouth 2 times daily Take while on oral narcotics to prevent or treat constipation., Disp-30 tablet, R-0, E-PrescribeWhile taking narcotics      tiZANidine (ZANAFLEX) 4 MG tablet Take 0.5 tablets (2 mg) by mouth 3 times daily as needed for muscle spasms, Disp-30 tablet, R-0, E-Prescribe         CONTINUE these medications which have CHANGED    Details   acetaminophen (TYLENOL) 325 MG tablet Take 2 tablets (650 mg) by mouth every 4 hours as needed for other (mild pain), Disp-100 tablet, R-0, E-Prescribe      aspirin 81 MG EC tablet Take 1 tablet (81 mg) by mouth 2 times daily, Disp-60 tablet, R-0, E-Prescribe         CONTINUE these medications which have NOT CHANGED    Details   glucosamine 500 MG CAPS capsule Take 1,000 mg by mouth daily, Historical      Melatonin 10 MG TABS tablet Take 10 mg  "by mouth nightly as needed for sleep, Historical      multivitamin w/minerals (MULTI-VITAMIN) tablet Take 1 tablet by mouth daily, Historical      Omega-3 Fatty Acids (FISH OIL) 1200 MG capsule Take 1,200 mg by mouth daily, Historical      atorvastatin (LIPITOR) 10 MG tablet Take 1 tablet (10 mg) by mouth daily, Disp-90 tablet, R-3, E-Prescribe               Discharge Procedure Orders   Referral Order to Outpatient Physical Therapy   Standing Status: Future   Referral Priority: Routine Referral Type: Rehab Therapy Physical Therapy   Number of Visits Requested: 1     Reason for your hospital stay   Order Comments: Right total knee replacement, left knee steroid injection     When to call - Contact Surgeon Team   Order Comments: You may experience symptoms that require follow-up before your scheduled appointment. Refer to the \"[employer]\" for instructions on when to contact your Surgeon Team if you are concerned about pain control, blood clots, constipation, or if you are unable to urinate.     When to call - Reach out to Urgent Care   Order Comments: If you are not able to reach your Surgeon Team and you need immediate care, go to the Orthopedic Walk-in Clinic or Urgent Care at your Surgeon's office.  Do NOT go to the Emergency Room unless you have shortness of breath, chest pain, or other signs of a medical emergency.     When to call - Reasons to Call 911   Order Comments: Call 911 immediately if you experience sudden-onset chest pain, arm weakness/numbness, slurred speech, or shortness of breath     Discharge Instruction - Breathing exercises   Order Comments: Perform breathing exercises using your Incentive Spirometer 10 times per hour while awake for 2 weeks.     Symptoms - Fever Management   Order Comments: A low grade fever can be expected after surgery.  Use acetaminophen (TYLENOL) as needed for fever management.  Contact your Surgeon Team if you have a fever greater than 101.5 F, chills, and/or night " sweats.     Symptoms - Constipation management   Order Comments: Constipation (hard, dry bowel movements) is expected after surgery due to the combination of being less active, the anesthetic, and the opioid pain medication.  You can do the following to help reduce constipation:  ~  FLUIDS:  Drink clear liquids (water or Gatorade), or juice (apple/prune).  ~  DIET:  Eat a fiber rich diet.    ~  ACTIVITY:  Get up and move around several times a day.  Increase your activity as you are able.  MEDICATIONS:  Reduce the risk of constipation by starting medications before you are constipated.  You can take Miralax   (1 packet as directed) and/or a stool softener (Senokot 1-2 tablets 1-2 times a day).  If you already have constipation and these medications are not working, you can get magnesium citrate and use as directed.  If you continue to have constipation you can try an over the counter suppository or enema.  Call your Surgeon Team if it has been greater than 3 days since your last bowel movement.     Symptoms - Reduced Urine Output   Order Comments: Changes in the amount of fluids you drank before and after surgery may result in problems urinating.  It is important to stay well-hydrated after surgery and drink plenty of water. If it has been greater than 8 hours since you have urinated despite drinking plenty of water, call your Surgeon Team.     Activity - Exercises to prevent blood clots   Order Comments: Unless otherwise directed by your Surgeon team, perform the following exercises at least three times per day for the first four weeks after surgery to prevent blood clots in your legs: 1) Point and flex your feet (Ankle Pumps), 2) Move your ankle around in big circles, 3) Wiggle your toes, 4) Walk, even for short distances, several times a day, will help decrease the risk of blood clots.     Order Specific Question Answer Comments   Is discharge order? Yes      Comfort and Pain Management - Pain after Surgery    Order Comments: Pain after surgery is normal and expected.  You will have some amount of pain for several weeks after surgery.  Your pain will improve with time.  There are several things you can do to help reduce your pain including: rest, ice, elevation, and using pain medications as needed. Contact your Surgeon Team if you have pain that persists or worsens after surgery despite rest, ice, elevation, and taking your medication(s) as prescribed. Contact your Surgeon Team if you have new numbness, tingling, or weakness in your operative extremity.     Comfort and Pain Management - Swelling after Surgery   Order Comments: Swelling and/or bruising of the surgical extremity is common and may persist for several months after surgery. In addition to frequent icing and elevation, gentle compressive support with an ACE wrap or tubigrip may help with swelling. Apply compression regularly, removing at least twice daily to perform skin checks. Contact your Surgeon Team if your swelling increases and is NOT associated with an increase in your activity level, or if your swelling increases and is associated with redness and pain.     Comfort and Pain Management - Cold therapy   Order Comments: Ice can be used to control swelling and discomfort after surgery. Place a thin towel over your operative site and apply the ice pack overtop. Leave ice pack in place for 20 minutes, then remove for 20 minutes. Repeat this 20 minutes on/20 minutes off routine as often as tolerated.     Medication Instructions - Acetaminophen (TYLENOL) Instructions   Order Comments: You were discharged with acetaminophen (TYLENOL) for pain management after surgery. Acetaminophen most effectively manages pain symptoms when it is taken on a schedule without missing doses (every four, six, or eight hours). Your Provider will prescribe a safe daily dose between 3000 - 4000 mg.  Do NOT exceed this daily dose. Most patients use acetaminophen for pain control  for the first four weeks after surgery.  You can wean from this medication as your pain decreases.     Medication Instructions - NSAID Instructions   Order Comments: You were discharged with an anti-inflammatory medication for pain management to use in combination with acetaminophen (TYLENOL) and the narcotic pain medication.  Take this medication exactly as directed.  You should only take one anti-inflammatory at a time.  Some common anti-inflammatories include: ibuprofen (ADVIL, MOTRIN), naproxen (ALEVE, NAPROSYN), celecoxib (CELEBREX), meloxicam (MOBIC), ketorolac (TORADOL).  Take this medication with food and water.     Medication Instructions - Opioids - Tapering Instructions   Order Comments: In the first three days following surgery, your symptoms may warrant use of the narcotic pain medication every four to six hours as prescribed. This is normal. As your pain symptoms improve, focus your efforts on decreasing (tapering) use of narcotic medications. The most successful tapering strategy is to first, decrease the number of tablets you take every 4-6 hours to the minimum prescribed. Then, increase the amount of time between doses.  For example:  First, taper to   or 1 tablet every 4-6 hours.  Then, taper to   or 1 tablet every 6-8 hours.  Then, taper to   or 1 tablet every 8-10 hours.  Then, taper to   or 1 tablet every 10-12 hours.  Then, taper to   or 1 tablet at bedtime.  The bedtime dose can help with comfort during sleep and is typically the last dose to be discontinued after surgery.     Follow Up Care   Order Comments: Follow-up with your Surgeon Team in 2 weeks for wound check.     Medication instructions -  Anticoagulation - aspirin   Order Comments: Take the aspirin as prescribed for a total of four weeks after surgery.  This is given to help minimize your risk of blood clot.     Comfort and Pain Management - LOWER Extremity Elevation   Order Comments: Swelling is expected for several months after  "surgery. This type of swelling is usually associated with gravity and activity, and can be improved with elevation.   The best way to do this is to get your \"toes above your nose\" by laying down and placing several pillows lengthwise under your calf and heel. When elevating your leg keep your knee completely straight. Perform this elevation as often as possible especially for the first two weeks after surgery.     Medication Instructions - Opioid Instructions (Greater than or equal to 65 years)   Order Comments: You were discharged with an opioid medication (hydromorphone, oxycodone, hydrocodone, or tramadol). This medication should only be taken for breakthrough pain that is not controlled with acetaminophen (TYLENOL). If you rate your pain less than 3 you do not need this medication.  Pain rating 0-3:  You do not need this medication  Pain rating 4-6:  Take 1/2 tablet every 4-6 hours as needed  Pain rating 7-10:  Take 1 tablet every 4-6 hours as needed  Do not exceed 4 tablets per day     Activity - Total Knee Arthroplasty     Order Specific Question Answer Comments   Is discharge order? Yes      Return to Driving   Order Comments: Return to driving - Driving is NOT permitted until directed by your provider. Under no circumstance are you permitted to drive while using narcotic pain medications.     Order Specific Question Answer Comments   Is discharge order? Yes      Dressing / Wound Care - Wound   Order Comments: Leave dressing in place for ten days following surgery. May shower over dressing starting postop day one. May remove clear dressing and silver pads after ten days and shower directely over incision thereafter. Leave white tape strips in place until incision check in clinic. IF leaking, remove and redress. Wash hands before and after and use gloves. Call Orthopedic clinic for assistance if this occurs.     Dressing / Wound Care - NO Tub Bathing   Order Comments: Tub bathing, swimming, or any other " activities that will cause your incision to be submerged in water should be avoided until allowed by your Surgeon.     NO Precautions   Order Comments: No precautions directed by your Provider.  You may perform range of motion activities as tolerated.     Order Specific Question Answer Comments   Is discharge order? Yes      Weight bearing as tolerated   Order Comments: Weight bearing as tolerated on your operative extremity.     Order Specific Question Answer Comments   Is discharge order? Yes      Dressing Wound Care - Shower with wound/dressing NOT covered   Order Comments: You do not need to cover your dressing or incision in the shower, you may allow water and soap to run over top of the surgical dressing or incision. You may shower 2 days after surgery.  You are strictly prohibited from soaking or submerging the surgical wound underwater.     Crutches DME   Order Comments: DME Documentation: Describe the reason for need to support medical necessity: Impaired gait status post knee surgery. I, the undersigned, certify that the above prescribed supplies are medically necessary for this patient and is both reasonable and necessary in reference to accepted standards of medical practice in the treatment of this patient's condition and is not prescribed as a convenience.     Order Specific Question Answer Comments   DME Provider: [facility]    Crutch Type: Standard    Crutches Add On: NA    Length of Need: Lifetime      Cane DME   Order Comments: DME Documentation: Describe the reason for need to support medical necessity: Impaired gait status post knee surgery. I, the undersigned, certify that the above prescribed supplies are medically necessary for this patient and is both reasonable and necessary in reference to accepted standards of medical practice in the treatment of this patient's condition and is not prescribed as a convenience.     Order Specific Question Answer Comments   LOAN Provider: [employer]     Cane Type: Single Tip    Reminder: Patient can typically get 1 every 5 years      Walker DME   Order Comments: DME Documentation: Describe the reason for need to support medical necessity: Impaired gait status post knee surgery. I, the undersigned, certify that the above prescribed supplies are medically necessary for this patient and is both reasonable and necessary in reference to accepted standards of medical practice in the treatment of this patient's condition and is not prescribed as a convenience.     Order Specific Question Answer Comments   DME Provider: [facility]    Walker Type: Standard (2 Wheel)    Accessories: N/A      Discharge Instruction - Regular Diet Adult   Order Comments: Return to your pre-surgery diet unless instructed otherwise     Order Specific Question Answer Comments   Is discharge order? Yes          Tommy Robertson MD

## 2023-05-18 ENCOUNTER — MYC MEDICAL ADVICE (OUTPATIENT)
Dept: ORTHOPEDICS | Facility: CLINIC | Age: 68
End: 2023-05-18
Payer: COMMERCIAL

## 2023-05-18 DIAGNOSIS — Z96.651 STATUS POST TOTAL RIGHT KNEE REPLACEMENT: ICD-10-CM

## 2023-05-18 NOTE — TELEPHONE ENCOUNTER
Rx for tizanidine refill sent to pharmacy.       Ana Kingsley PA-C  5/18/2023 10:00 AM  [facility]  Orthopaedic Surgery

## 2023-05-22 DIAGNOSIS — Z96.651 STATUS POST TOTAL RIGHT KNEE REPLACEMENT: ICD-10-CM

## 2023-05-22 RX ORDER — OXYCODONE HYDROCHLORIDE 5 MG/1
5-10 TABLET ORAL EVERY 4 HOURS PRN
Qty: 26 TABLET | Refills: 0 | Status: SHIPPED | OUTPATIENT
Start: 2023-05-22 | End: 2023-06-07

## 2023-05-25 ENCOUNTER — DOCUMENTATION ONLY (OUTPATIENT)
Dept: ORTHOPEDICS | Facility: CLINIC | Age: 68
End: 2023-05-25
Payer: COMMERCIAL

## 2023-05-25 NOTE — PROGRESS NOTES
Received Completed forms Yes   Faxed Forms Faxed To: [facility]  Fax Number: [phone]   Sent to [facility] (Date) 5/24/23

## 2023-05-27 ENCOUNTER — HEALTH MAINTENANCE LETTER (OUTPATIENT)
Age: 68
End: 2023-05-27

## 2023-06-06 ENCOUNTER — MYC MEDICAL ADVICE (OUTPATIENT)
Dept: ORTHOPEDICS | Facility: CLINIC | Age: 68
End: 2023-06-06

## 2023-06-06 DIAGNOSIS — Z96.651 STATUS POST TOTAL RIGHT KNEE REPLACEMENT: ICD-10-CM

## 2023-06-06 NOTE — TELEPHONE ENCOUNTER
See My Chart request from pt for refill of Oxycodone postop.   in clinic today told Mckayla  she would sign refill.  Refilled Oxycodone.  Has RTN POP appt this Fri 6-9-23.    S.O./Nel Herrera RN.

## 2023-06-07 RX ORDER — OXYCODONE HYDROCHLORIDE 5 MG/1
5-10 TABLET ORAL EVERY 4 HOURS PRN
Qty: 26 TABLET | Refills: 0 | Status: SHIPPED | OUTPATIENT
Start: 2023-06-07 | End: 2024-05-06

## 2023-06-08 NOTE — PROGRESS NOTES
"The patient was informed of the following:    \"This virtual visit will be conducted via a call between you and your physician/provider. We have found that certain health care needs can be provided without the need for an extensive physical exam.  This service lets us provide the care you need with a short phone conversation.  If a prescription is necessary we can send it directly to your pharmacy.  If lab work is needed we can place an order for that and you can then stop by our lab to have the test done at a later time.     If during the course of the call the physician/provider feels a virtual visit is not appropriate, you will not be charged for this service.\"     _________________________________      ASSESSMENT/PLAN:  Stephane Shaikh is a 68 year old who is status post RTKA with Dr. Tariq on 5/11/23.    Incision appears to have blistering and possible dehiscence, though it is very difficult to assess via the phone video today. [place] I recommended he come to see Dr. Tariq on 6/13. He will keep the area covered and use antibiotic ointment on the wound. He was counseled on monitoring for redness, warmth, increasing pain, fever or chills, which he currently denies. His knee ROM is very supple and reportedly pain-free which is not consistent with a septic arthritis picture. He was advised to report to the ED if symptoms worsen over the weekend.       Stephane has our clinic number and will call with any questions or concerns.    Ana Kingsley PA-C  Orthopaedic Surgery    _________________________________    HISTORY OF PRESENT ILLNESS:  Stephane Shaikh is a 68 year old male who is approximately 4 weeks status post the above procedure.    Weight bearing status/devices: ambulating independently.   Pain level and management: pain is decreasing. Currently using oxycodone and has weaned from muscle relaxants.   Physical therapy & ROM: working twice weekly with  Noble López of " [facility] in [place].     He also reports onset of drainage over the last week, though he can't recall exactly when. It was clear at first, now slightly cloudy. Not associated with increased pain. He denies recent fevers and chills, as well as any other symptoms concerning for infection.     DVT prophylaxis: ASA 162mg x 4 weeks  Patient denies calf pain or tenderness.      MEDICATIONS:   Current Outpatient Rx   Medication Sig Dispense Refill     acetaminophen (TYLENOL) 325 MG tablet Take 2 tablets (650 mg) by mouth every 4 hours as needed for other (mild pain) 100 tablet 0     aspirin 81 MG EC tablet Take 1 tablet (81 mg) by mouth 2 times daily 60 tablet 0     atorvastatin (LIPITOR) 10 MG tablet Take 1 tablet (10 mg) by mouth daily 90 tablet 3     glucosamine 500 MG CAPS capsule Take 1,000 mg by mouth daily       Melatonin 10 MG TABS tablet Take 10 mg by mouth nightly as needed for sleep       multivitamin w/minerals (MULTI-VITAMIN) tablet Take 1 tablet by mouth daily       Omega-3 Fatty Acids (FISH OIL) 1200 MG capsule Take 1,200 mg by mouth daily       ondansetron (ZOFRAN ODT) 4 MG ODT tab Take 1-2 tablets (4-8 mg) by mouth every 8 hours as needed for nausea Dissolve ON the tongue. 10 tablet 0     oxyCODONE (ROXICODONE) 5 MG tablet Take 1-2 tablets (5-10 mg) by mouth every 4 hours as needed for moderate to severe pain 26 tablet 0     polyethylene glycol (MIRALAX) 17 g packet Take 17 g by mouth daily 7 packet 0     senna-docusate (SENOKOT-S/PERICOLACE) 8.6-50 MG tablet Take 1-2 tablets by mouth 2 times daily Take while on oral narcotics to prevent or treat constipation. 30 tablet 0     tiZANidine (ZANAFLEX) 4 MG tablet Take 0.5 tablets (2 mg) by mouth 3 times daily as needed for muscle spasms 30 tablet 0         ALLERGIES: Patient has no known allergies.       PHYSICAL EXAMINATION:   The physical exam is limited due to the nature of this virtual visit. The patient is comfortable and in no acute distress. The  affect is appropriate and breathing is non-labored. The incision (inspected via virtual conferencing) there is area of opening/blistering over the proximal incision. Difficult to discern if it tracks or is more superficial. No significant surrounding redness appreciated.     ROM: appears supple and to be flexing well-past 90 degrees. No pain with ROM.   Isometric activation of the quadriceps: in tact  SLR: in tact  Gait: not viewed.     Motor intact distally throughout the tibial and peroneal nerve distributions, 5/5 strength with tibialis anterior, gastrocnemius and soleus, EHL, FHL firing

## 2023-06-09 ENCOUNTER — VIRTUAL VISIT (OUTPATIENT)
Dept: ORTHOPEDICS | Facility: CLINIC | Age: 68
End: 2023-06-09
Payer: COMMERCIAL

## 2023-06-09 DIAGNOSIS — Z98.890 S/P KNEE SURGERY: Primary | ICD-10-CM

## 2023-06-09 PROCEDURE — 99024 POSTOP FOLLOW-UP VISIT: CPT | Mod: VID | Performed by: PHYSICIAN ASSISTANT

## 2023-06-09 ASSESSMENT — KOOS JR
BENDING TO THE FLOOR TO PICK UP OBJECT: MODERATE
TWISING OR PIVOTING ON KNEE: MILD
KOOS JR SCORING: 59.38
HOW SEVERE IS YOUR KNEE STIFFNESS AFTER FIRST WAKING IN MORNING: MODERATE
STANDING UPRIGHT: MILD
STRAIGHTENING KNEE FULLY: MODERATE
RISING FROM SITTING: MODERATE
GOING UP OR DOWN STAIRS: MILD

## 2023-06-09 NOTE — NURSING NOTE
Reason For Visit:   Chief Complaint   Patient presents with     Surgical Followup     DOS: 5/11/23 // Right Total Knee Arthroplasty// 4 weeks       There were no vitals taken for this visit.    Pain Assessment  Patient Currently in Pain: Yes  0-10 Pain Scale: 1  Primary Pain Location: Knee (Right)  Pain Descriptors: Other (comment) (irritation)    Mckayla Espinal ATC

## 2023-06-09 NOTE — LETTER
"    6/9/2023         RE: Stephane Shaikh  [address] [address]        Dear Colleague,    Thank you for referring your patient, Stephane Shaikh, to the [facility] [place]. Please see a copy of my visit note below.    The patient was informed of the following:    \"This virtual visit will be conducted via a call between you and your physician/provider. We have found that certain health care needs can be provided without the need for an extensive physical exam.  This service lets us provide the care you need with a short phone conversation.  If a prescription is necessary we can send it directly to your pharmacy.  If lab work is needed we can place an order for that and you can then stop by our lab to have the test done at a later time.     If during the course of the call the physician/provider feels a virtual visit is not appropriate, you will not be charged for this service.\"     _________________________________      ASSESSMENT/PLAN:  Stephane Shaikh is a 68 year old who is status post RTKA with Dr. Tariq on 5/11/23.    Incision appears to have blistering and possible dehiscence, though it is very difficult to assess via the phone video today. He happen to be coming to the [facility] next week so I recommended he come to see Dr. Tariq on 6/13. He will keep the area covered and use antibiotic ointment on the wound. He was counseled on monitoring for redness, warmth, increasing pain, fever or chills, which he currently denies. His knee ROM is very supple and reportedly pain-free which is not consistent with a septic arthritis picture. He was advised to report to the ED if symptoms worsen over the weekend.       Stephane has our clinic number and will call with any questions or concerns.    Ana Kingsley PA-C  Orthopaedic Surgery    _________________________________    HISTORY OF PRESENT ILLNESS:  Stephane Shaikh is a 68 year old male who is approximately 4 weeks status post the " above procedure.    Weight bearing status/devices: ambulating independently.   Pain level and management: pain is decreasing. Currently using oxycodone and has weaned from muscle relaxants.   Physical therapy & ROM: working twice weekly with  Noble Rene of [facility] in [place].     He also reports onset of drainage over the last week, though he can't recall exactly when. It was clear at first, now slightly cloudy. Not associated with increased pain. He denies recent fevers and chills, as well as any other symptoms concerning for infection.     DVT prophylaxis: ASA 162mg x 4 weeks  Patient denies calf pain or tenderness.      MEDICATIONS:   Current Outpatient Rx   Medication Sig Dispense Refill    acetaminophen (TYLENOL) 325 MG tablet Take 2 tablets (650 mg) by mouth every 4 hours as needed for other (mild pain) 100 tablet 0    aspirin 81 MG EC tablet Take 1 tablet (81 mg) by mouth 2 times daily 60 tablet 0    atorvastatin (LIPITOR) 10 MG tablet Take 1 tablet (10 mg) by mouth daily 90 tablet 3    glucosamine 500 MG CAPS capsule Take 1,000 mg by mouth daily      Melatonin 10 MG TABS tablet Take 10 mg by mouth nightly as needed for sleep      multivitamin w/minerals (MULTI-VITAMIN) tablet Take 1 tablet by mouth daily      Omega-3 Fatty Acids (FISH OIL) 1200 MG capsule Take 1,200 mg by mouth daily      ondansetron (ZOFRAN ODT) 4 MG ODT tab Take 1-2 tablets (4-8 mg) by mouth every 8 hours as needed for nausea Dissolve ON the tongue. 10 tablet 0    oxyCODONE (ROXICODONE) 5 MG tablet Take 1-2 tablets (5-10 mg) by mouth every 4 hours as needed for moderate to severe pain 26 tablet 0    polyethylene glycol (MIRALAX) 17 g packet Take 17 g by mouth daily 7 packet 0    senna-docusate (SENOKOT-S/PERICOLACE) 8.6-50 MG tablet Take 1-2 tablets by mouth 2 times daily Take while on oral narcotics to prevent or treat constipation. 30 tablet 0    tiZANidine (ZANAFLEX) 4 MG tablet Take 0.5 tablets (2 mg) by mouth 3 times  daily as needed for muscle spasms 30 tablet 0         ALLERGIES: Patient has no known allergies.       PHYSICAL EXAMINATION:   The physical exam is limited due to the nature of this virtual visit. The patient is comfortable and in no acute distress. The affect is appropriate and breathing is non-labored. The incision (inspected via virtual conferencing) there is area of opening/blistering over the proximal incision. Difficult to discern if it tracks or is more superficial. No significant surrounding redness appreciated.     ROM: appears supple and to be flexing well-past 90 degrees. No pain with ROM.   Isometric activation of the quadriceps: in tact  SLR: in tact  Gait: not viewed.     Motor intact distally throughout the tibial and peroneal nerve distributions, 5/5 strength with tibialis anterior, gastrocnemius and soleus, EHL, FHL firing

## 2023-06-11 DIAGNOSIS — E78.5 HYPERLIPIDEMIA LDL GOAL <100: ICD-10-CM

## 2023-06-12 ENCOUNTER — TELEPHONE (OUTPATIENT)
Dept: FAMILY MEDICINE | Facility: OTHER | Age: 68
End: 2023-06-12

## 2023-06-12 NOTE — TELEPHONE ENCOUNTER
Atorvastatin      Last Written Prescription Date:  6/14/22  Last Fill Quantity: 90,   # refills: 3  Last Office Visit: 5/2/23  Future Office visit:         Patient needs an appointment to establish care

## 2023-06-13 ENCOUNTER — OFFICE VISIT (OUTPATIENT)
Dept: ORTHOPEDICS | Facility: CLINIC | Age: 68
End: 2023-06-13
Payer: COMMERCIAL

## 2023-06-13 DIAGNOSIS — Z98.890 S/P KNEE SURGERY: Primary | ICD-10-CM

## 2023-06-13 PROCEDURE — 99024 POSTOP FOLLOW-UP VISIT: CPT | Performed by: ORTHOPAEDIC SURGERY

## 2023-06-13 RX ORDER — ATORVASTATIN CALCIUM 10 MG/1
10 TABLET, FILM COATED ORAL DAILY
Qty: 90 TABLET | Refills: 1 | Status: SHIPPED | OUTPATIENT
Start: 2023-06-13 | End: 2023-06-27

## 2023-06-13 NOTE — LETTER
6/13/2023         RE: Stephane Shaikh  [address] [address]        Dear Colleague,    Thank you for referring your patient, Stephane Shaikh, to the [facility] [place]. Please see a copy of my visit note below.    Patient is a 68-year-old male who is now 1 month status post a right total knee arthroplasty.  He had a virtual visit last Friday and there was some concerns about his wound.  He was in the [facility] anyway and came here for an onsite wound check.    Overall he feels that he is doing great.  He is walking without pain or without any amatory aid.  He is going to physical therapy 2 times a week.     He noticed that the upper part of his incision had some blistering and a slight amount of serous drainage in select areas.  This was noted by the virtual visit on Friday and a picture was placed into the chart.  He began to do wet-to-dry dressings and covering it with bacitracin gel.    On today's exam the patient has overall benign a fit appearing knee, with no redness or warmth.  Good straight leg raising effort without a lag.  Range of motion 0-95 without pain and with a soft endpoint.  Kneecap tracks well through an active arc of motion.    The proximal part of the incision has 2 or 3 pinpoint areas that have a small amount of serous drainage on the dressing that was put in place 2 hours ago.  There is nothing that we can express from the wound once the dressing is removed.  The lower part of the incision is closed.    X-rays were taken and show satisfactory position of component and overall alignment    Assessment: Satisfactory motion and  Component positioning.  Superior part of the wound appears to be reactive to either the Steri-Strips and or the suture.    Plan: We gave him Medihoney and told him to continue to do dressing changes as needed.  He will apply the Medihoney once a day.    He will be back in the [facility] on the 18th and will follow-up with me at  that time.  Meanwhile he will stay out of water immersion particularly stay out of the lake water.  He will continue to shower.    This is a postop visit    Lorena Tariq MD  Professor Orthopedic Surgery  [facility]

## 2023-06-13 NOTE — PROGRESS NOTES
Patient is a 68-year-old male who is now 1 month status post a right total knee arthroplasty.  He had a virtual visit last Friday and there was some concerns about his wound.  [place] here for an onsite wound check.    Overall he feels that he is doing great.  He is walking without pain or without any amatory aid.  He is going to physical therapy 2 times a week.     He noticed that the upper part of his incision had some blistering and a slight amount of serous drainage in select areas.  This was noted by the virtual visit on Friday and a picture was placed into the chart.  He began to do wet-to-dry dressings and covering it with bacitracin gel.    On today's exam the patient has overall benign a fit appearing knee, with no redness or warmth.  Good straight leg raising effort without a lag.  Range of motion 0-95 without pain and with a soft endpoint.  Kneecap tracks well through an active arc of motion.    The proximal part of the incision has 2 or 3 pinpoint areas that have a small amount of serous drainage on the dressing that was put in place 2 hours ago.  There is nothing that we can express from the wound once the dressing is removed.  The lower part of the incision is closed.    X-rays were taken and show satisfactory position of component and overall alignment    Assessment: Satisfactory motion and  Component positioning.  Superior part of the wound appears to be reactive to either the Steri-Strips and or the suture.    Plan: We gave him Medihoney and told him to continue to do dressing changes as needed.  He will apply the Medihoney once a day.    He will be back in the [facility] on the 18th and will follow-up with me at that time.  Meanwhile he will stay out of water immersion particularly stay out of the lake water.  He will continue to shower.    This is a postop visit    Lorena Tariq MD  Professor Orthopedic Surgery  [facility]

## 2023-06-13 NOTE — NURSING NOTE
Reason For Visit:   Chief Complaint   Patient presents with     Surgical Followup     wound check DOS: 5/11/23 // Right Total Knee Arthroplasty       Primary MD: No Ref-Primary, Physician  Referring MD: Est     ?  No  Occupation retired.     Date of injury: No  Type of injury: No.     Date of surgery: 30 years ago  Type of surgery: scope L knee  Date of surgery: 5/11/23  Total knee arthoplasty right  Left knee intra -articular knee injection (Di)     Smoker: No  Request smoking cessation information: No    There were no vitals taken for this visit.    Pain Assessment  Patient Currently in Pain: Denies

## 2023-06-27 ENCOUNTER — OFFICE VISIT (OUTPATIENT)
Dept: FAMILY MEDICINE | Facility: OTHER | Age: 68
End: 2023-06-27
Attending: FAMILY MEDICINE
Payer: COMMERCIAL

## 2023-06-27 VITALS
TEMPERATURE: 98.1 F | BODY MASS INDEX: 34.9 KG/M2 | OXYGEN SATURATION: 96 % | RESPIRATION RATE: 18 BRPM | HEART RATE: 89 BPM | HEIGHT: 70 IN | SYSTOLIC BLOOD PRESSURE: 140 MMHG | WEIGHT: 243.8 LBS | DIASTOLIC BLOOD PRESSURE: 76 MMHG

## 2023-06-27 DIAGNOSIS — R73.9 ELEVATED BLOOD SUGAR: ICD-10-CM

## 2023-06-27 DIAGNOSIS — E78.5 HYPERLIPIDEMIA LDL GOAL <100: ICD-10-CM

## 2023-06-27 LAB
EST. AVERAGE GLUCOSE BLD GHB EST-MCNC: 105 MG/DL
HBA1C MFR BLD: 5.3 %

## 2023-06-27 PROCEDURE — 83036 HEMOGLOBIN GLYCOSYLATED A1C: CPT | Mod: ZL | Performed by: FAMILY MEDICINE

## 2023-06-27 PROCEDURE — G0463 HOSPITAL OUTPT CLINIC VISIT: HCPCS

## 2023-06-27 PROCEDURE — 36415 COLL VENOUS BLD VENIPUNCTURE: CPT | Mod: ZL | Performed by: FAMILY MEDICINE

## 2023-06-27 PROCEDURE — 99213 OFFICE O/P EST LOW 20 MIN: CPT | Performed by: FAMILY MEDICINE

## 2023-06-27 RX ORDER — ATORVASTATIN CALCIUM 10 MG/1
10 TABLET, FILM COATED ORAL DAILY
Qty: 90 TABLET | Refills: 3 | Status: SHIPPED | OUTPATIENT
Start: 2023-06-27 | End: 2024-05-06

## 2023-06-27 ASSESSMENT — PAIN SCALES - GENERAL: PAINLEVEL: NO PAIN (0)

## 2023-06-27 NOTE — PROGRESS NOTES
"  Assessment & Plan     Hyperlipidemia LDL goal <100  Lipids, CMP done 5/2/23.  - atorvastatin (LIPITOR) 10 MG tablet; Take 1 tablet (10 mg) by mouth daily    Elevated blood sugar  - Hemoglobin A1c      MENDEZ DE LEON, DO  [facility] - KERI Vargas is a 68 year old, presenting for the following health issues:  Lipids and Knee Pain (Right knee)      HPI     Seen for preop 5/2/23, plan was to see back today for a physical.  He declines a physical today, just wishing to do med refills and check A1c due to high blood glucose at preop visit.      Review of Systems   Constitutional: Negative for fever.   Gastrointestinal: Negative for abdominal pain.   Musculoskeletal: Negative for myalgias.            Objective    BP (!) 140/76   Pulse 89   Temp 98.1  F (36.7  C) (Tympanic)   Resp 18   Ht 1.778 m (5' 10\")   Wt 110.6 kg (243 lb 12.8 oz)   SpO2 96%   BMI 34.98 kg/m    Body mass index is 34.98 kg/m .  Physical Exam  Constitutional:       General: He is not in acute distress.     Appearance: Normal appearance.   Pulmonary:      Effort: Pulmonary effort is normal.   Neurological:      Mental Status: He is alert and oriented to person, place, and time.                            "

## 2023-07-03 ASSESSMENT — ENCOUNTER SYMPTOMS
ABDOMINAL PAIN: 0
FEVER: 0
MYALGIAS: 0

## 2023-07-18 ENCOUNTER — VIRTUAL VISIT (OUTPATIENT)
Dept: ORTHOPEDICS | Facility: CLINIC | Age: 68
End: 2023-07-18
Payer: COMMERCIAL

## 2023-07-18 DIAGNOSIS — Z98.890 S/P KNEE SURGERY: Primary | ICD-10-CM

## 2023-07-18 PROCEDURE — 99024 POSTOP FOLLOW-UP VISIT: CPT | Mod: 95 | Performed by: ORTHOPAEDIC SURGERY

## 2023-07-18 NOTE — NURSING NOTE
Reason For Visit:   Chief Complaint   Patient presents with     Surgical Followup     Telephone [phone] DOS: 5/11/23 // Right Total Knee Arthroplasty// 8 -10 weeks        Primary MD: No Ref-Primary, Physician  Referring MD: Est     ?  No  Occupation retired.     Date of injury: No  Type of injury: No.     Date of surgery: 30 years ago  Type of surgery: scope L knee    Date of surgery: 5/11/23  Right TKA and left knee steroid injection      Smoker: No  Request smoking cessation information: No    There were no vitals taken for this visit.    Pain Assessment  Patient Currently in Pain: Yes  0-10 Pain Scale: 1  Primary Pain Location: Knee (right)    Sam is a 68 year old who is being evaluated via a billable Telephone visit.      How would you like to obtain your AVS? MyChart  If the video visit is dropped, the invitation should be resent by: Text to cell phone: [phone]  Will anyone else be joining your video visit? No  {If patient encounters technical issues they should call [phone] :[id number]}      Telephone-Visit Details    Type of service:  Telephone Visit   Telephone Start Time: {video visit start/end time for provider to select:001974}  Telephone End Time:{video visit start/end time for provider to select:444727}    Originating Location (pt. Location): Home  {PROVIDER LOCATION On-site should be selected for visits conducted from your clinic location or adjoining [facility] hospital, academic office, or other nearby [facility] building. Off-site should be selected for all other provider locations, including home:[id number]}  Distant Location (provider location):  On-site

## 2023-07-18 NOTE — LETTER
7/18/2023         RE: Stephane Shaikh  [address] [address]        Dear Colleague,    Thank you for referring your patient, Stephane Shaikh, to the [facility] [place]. Please see a copy of my visit note below.    Reason For Visit:   Chief Complaint   Patient presents with    Surgical Followup     Telephone [phone] DOS: 5/11/23 // Right Total Knee Arthroplasty// 8 -10 weeks        Primary MD: No Ref-Primary, Physician  Referring MD: Est     ?  No  Occupation retired.     Date of injury: No  Type of injury: No.     Date of surgery: 30 years ago  Type of surgery: scope L knee    Date of surgery: 5/11/23  Right TKA and left knee steroid injection      Smoker: No  Request smoking cessation information: No    There were no vitals taken for this visit.    Pain Assessment  Patient Currently in Pain: Yes  0-10 Pain Scale: 1  Primary Pain Location: Knee (right)    Sam is a 68 year old who is being evaluated via a billable Telephone visit.      How would you like to obtain your AVS? MyChart  If the video visit is dropped, the invitation should be resent by: Text to cell phone: [phone]  Will anyone else be joining your video visit? No    Telephone-Visit Details    Type of service:  Telephone Visit   Telephone Start Time: 11:05  Telephone End Time:11:13    Originating Location (pt. Location): Home    Distant Location (provider location):  On-site    Subjective   Patient is a 68-year-old male who is now 8 weeks status post right total knee replacement.  He lives up at [facility] and asked to have this be a virtual visit.  He is done with physical therapy.  He reached 105 degrees about 5 weeks ago and since that time has not been remeasured.  He is able to do steps in a tandem fashion without complaints and he is able to go around on the bike.  Has been doing stationary bike for about 5 weeks.    He did have some blistering around the skin.  He did send an  image of the wound at this point time and there are some scabs remaining and spotty fashion across the midportion of the wound.  Each scab is quite small in size and there is no redness or warmth or drainage around it.    He did ask if he could swim in lake water and ice that he could not swim until he had all of the scabs done and gone.    He is no longer taking any meds.  He is doing everything that he wants to be doing except getting into lake water.    I told him that we needed standing alignment films.  He will be back down in October to receive an injection in his opposite left knee prior to going to [place] for the winter.      At that time he will get a long-leg alignment film, AP.    Lorena Tariq MD  Professor Orthopedic Surgery  [facility]

## 2023-10-16 DIAGNOSIS — Z98.890 S/P KNEE SURGERY: Primary | ICD-10-CM

## 2023-10-17 ENCOUNTER — OFFICE VISIT (OUTPATIENT)
Dept: ORTHOPEDICS | Facility: CLINIC | Age: 68
End: 2023-10-17
Payer: COMMERCIAL

## 2023-10-17 ENCOUNTER — CARE COORDINATION (OUTPATIENT)
Dept: ORTHOPEDICS | Facility: CLINIC | Age: 68
End: 2023-10-17

## 2023-10-17 ENCOUNTER — ANCILLARY PROCEDURE (OUTPATIENT)
Dept: GENERAL RADIOLOGY | Facility: CLINIC | Age: 68
End: 2023-10-17
Attending: ORTHOPAEDIC SURGERY
Payer: COMMERCIAL

## 2023-10-17 VITALS — HEIGHT: 71 IN | WEIGHT: 254 LBS | BODY MASS INDEX: 35.56 KG/M2

## 2023-10-17 DIAGNOSIS — E66.812 CLASS 2 SEVERE OBESITY DUE TO EXCESS CALORIES WITH SERIOUS COMORBIDITY AND BODY MASS INDEX (BMI) OF 35.0 TO 35.9 IN ADULT (H): ICD-10-CM

## 2023-10-17 DIAGNOSIS — Z98.890 S/P KNEE SURGERY: ICD-10-CM

## 2023-10-17 DIAGNOSIS — E66.01 CLASS 2 SEVERE OBESITY DUE TO EXCESS CALORIES WITH SERIOUS COMORBIDITY AND BODY MASS INDEX (BMI) OF 35.0 TO 35.9 IN ADULT (H): ICD-10-CM

## 2023-10-17 DIAGNOSIS — M17.12 ARTHRITIS OF LEFT KNEE: Primary | ICD-10-CM

## 2023-10-17 PROCEDURE — 77073 BONE LENGTH STUDIES: CPT | Performed by: STUDENT IN AN ORGANIZED HEALTH CARE EDUCATION/TRAINING PROGRAM

## 2023-10-17 PROCEDURE — 20610 DRAIN/INJ JOINT/BURSA W/O US: CPT | Mod: LT | Performed by: ORTHOPAEDIC SURGERY

## 2023-10-17 PROCEDURE — 99213 OFFICE O/P EST LOW 20 MIN: CPT | Mod: 25 | Performed by: ORTHOPAEDIC SURGERY

## 2023-10-17 RX ORDER — TRIAMCINOLONE ACETONIDE 40 MG/ML
40 INJECTION, SUSPENSION INTRA-ARTICULAR; INTRAMUSCULAR
Status: DISCONTINUED | OUTPATIENT
Start: 2023-10-17 | End: 2024-05-23

## 2023-10-17 RX ORDER — LIDOCAINE HYDROCHLORIDE 10 MG/ML
9 INJECTION, SOLUTION EPIDURAL; INFILTRATION; INTRACAUDAL; PERINEURAL
Status: DISCONTINUED | OUTPATIENT
Start: 2023-10-17 | End: 2024-05-23

## 2023-10-17 RX ADMIN — TRIAMCINOLONE ACETONIDE 40 MG: 40 INJECTION, SUSPENSION INTRA-ARTICULAR; INTRAMUSCULAR at 13:15

## 2023-10-17 RX ADMIN — LIDOCAINE HYDROCHLORIDE 9 ML: 10 INJECTION, SOLUTION EPIDURAL; INFILTRATION; INTRACAUDAL; PERINEURAL at 13:15

## 2023-10-17 NOTE — PROGRESS NOTES
Patient is a 69 y/o male here for a F/U for his Right total knee arthroplasty DOS 5/11/23.  He did have some issues with wound healing, probable reaction to Exofin, but this healed up eventually.    He loves his new knee and has not complaints.  He is interested in scheduling his LEFT knee total knee arthroplasty next May.    He is leaving for [place] for the winter this weekend.  He would like an injection into his left knee today.    He is trying hard to lose weight.  By our scales today he has not lost any weight but by his scales in his gym and he has lost 20 pounds.  He continues to have a BMI of 36 based on our scales.    In regards to his function he feels like his right knee is doing very well.  His total limitations with his left knee.  He continues to limp at the end of the day and feels that his exercise stamina is limited now by his left knee    PE  RIGHT  knee: ROM 5-120.  Kneecap tracks well through an active arc of motion  Wound slightly keloid but well healed  No knee effusion    Left knee: Fullness of the knee suggestive of synovitis.  Trace fluid wave.    Range of motion .    X-rays were reviewed.  Long-leg alignment view shows neutral alignment and patient's right knee, status post total knee replacement.  Left knee shows near bone-on-bone changes medial compartment with a varus alignment of 11 degrees    Sagittal views right knee show satisfactory component positioning    Assessment: 1.  Excellent early postoperative results right knee  2.  Left knee with varus alignment and decreasing range of motion    Plan: 1.  Left knee injection today with steroids.  The injection be separately dictated  2.  Plan for left total knee replacement next May    Lorena Tariq MD  Professor Orthopedic Surgery  [facility]

## 2023-10-17 NOTE — PROGRESS NOTES
Pre-Op Teaching was done in person at the clinic.    Teaching Flowsheet   Relevant Diagnosis: Pre-Op Teaching  Teaching Topic: L TKA Pre-Op     Person(s) involved in teaching:   Patient     Motivation Level:  Asks Questions: Yes  Eager to Learn: Yes  Cooperative: Yes  Receptive (willing/able to accept information): Yes  Any cultural factors/Confucianist beliefs that may influence understanding or compliance? No     Patient demonstrates understanding of the following:  Reason for the appointment, diagnosis and treatment plan: Yes  Knowledge of proper use of medications and conditions for which they are ordered (with special attention to potential side effects or drug interactions): Yes  Which situations necessitate calling provider and whom to contact: Yes- discussed the stoplight tool to help assist with this.      Teaching Concerns Addressed:      Proper use of surgical scrub explain: Yes    Nutritional needs and diet plan: Yes  Pain management techniques: Yes  Wound Care: Yes  How and/when to access community resources: Yes     Instructional Materials Used/Given:  2 bottle of chlorhexidine, a surgery packet, and a joint booklet given to patient in clinic.      - Important contact info/ phone numbers: emphasizing clinic number and after hours number  - Map/ location of surgery  - Medications to avoid  - Showering instructions  - Stop light tool  - Your Guide to Joint Replacement Booklet   - Antibiotic post op before every dentist appointment, procedure, or surgery for the rest of their life.     Additionally the following was discussed with patient:  - Son, will be driving the patient to surgery and staying with them for 4-5 days after surgery.  - Need to schedule a dentist appointment and get done any recommended dental work prior to surgery.   - Online joint replacement call and the use of [employer] to send educational messages. Asked patient to sign up for join class during visit and patient declined to sign up at  this time and stated will sign up later. Sheet with sign up instruction given to patient.   - Told patient to check in with insurance to check about coverage.     -Next step: Schedule a surgery date and schedule a Pre-Op appointment with PCP     Time spent with patient: 15 minutes.

## 2023-10-17 NOTE — NURSING NOTE
"Reason For Visit:   Chief Complaint   Patient presents with    RECHECK     6 month DOS: 5/11/23 // Right Total Knee Arthroplasty and left knee injection last injection 5/11/23       Primary MD: Sixto Tran  Ref. MD: EST    ?  No  Occupation retired.     Date of injury: No  Type of injury: No.     Date of surgery: 30 years ago  Type of surgery: scope L knee  Date of surgery: 5/11/23  Total knee arthoplasty right  Left knee intra -articular knee injection (Di)     Smoker: No  Request smoking cessation information: No  Ht 1.795 m (5' 10.67\")   Wt 115.2 kg (254 lb)   BMI 35.76 kg/m      Pain Assessment  Patient Currently in Pain: Denies          Susan Raphael LPN   "

## 2023-10-17 NOTE — LETTER
10/17/2023         RE: Stephane Shaikh  [address] [facility] [address]        Dear Colleague,    Thank you for referring your patient, Stephane Shaikh, to the [facility]. Please see a copy of my visit note below.    Large Joint Injection/Arthocentesis: L knee joint    Date/Time: 10/17/2023 1:15 PM    Performed by: Lorena Tariq MD  Authorized by: Lorena Tariq MD    Indications:  Pain and osteoarthritis  Needle Size:  21 G  Guidance: landmark guided    Approach:  Medial  Location:  Knee      Medications:  40 mg triamcinolone 40 MG/ML; 9 mL lidocaine (PF) 1 %  Outcome:  Tolerated well, no immediate complications  Procedure discussed: discussed risks, benefits, and alternatives    Consent Given by:  Patient  Timeout: timeout called immediately prior to procedure    Prep: patient was prepped and draped in usual sterile fashion     HISTORY OF PRESENT ILLNESS:  Stephane Shaikh is a 68 year old male with knee pain.  Diagnosis is knee arthritis supported by history, physical exam, and imaging.    PROCEDURE:  After informed verbal and writtten consent, under sterile conditions, patient's left knee was injected with 9 cc of Lidocaine and 1 cc of Kenalog (40 mg/ml).   The injection was done by Dr. Tariq.    Patient had good pain relief upon leaving the clinic, and will follow up with us on an as needed basis.        [facility] [address] [address] [phone]  Dept: [phone]  ______________________________________________________________________________    Patient: Stephane Shaikh   : 1955   MRN: [id number]   2023    INVASIVE PROCEDURE SAFETY CHECKLIST    Date: 10/17/2023   Procedure:Left knee steroid injection  Patient Name: Stephane Shaikh  MRN: [id number]  YOB: 1955    Action: Complete sections as appropriate. Any discrepancy results in a HARD COPY  until resolved.     PRE PROCEDURE:  Patient ID verified with 2 identifiers (name and  or MRN): Yes  Procedure and site verified with patient/designee (when able): Yes  Accurate consent documentation in medical record: Yes  H&P (or appropriate assessment) documented in medical record: NA  H&P must be up to 20 days prior to procedure and updates within 24 hours of procedure as applicable: NA  Relevant diagnostic and radiology test results appropriately labeled and displayed as applicable: Yes  Procedure site(s) marked with provider initials: NA    TIMEOUT:  Time-Out performed immediately prior to starting procedure, including verbal and active participation of all team members addressing the following:Yes  * Correct patient identify  * Confirmed that the correct side and site are marked  * An accurate procedure consent form  * Agreement on the procedure to be done  * Correct patient position  * Relevant images and results are properly labeled and appropriately displayed  * The need to administer antibiotics or fluids for irrigation purposes during the procedure as applicable   * Safety precautions based on patient history or medication use    DURING PROCEDURE: Verification of correct person, site, and procedures any time the responsibility for care of the patient is transferred to another member of the care team.       The following medications were given:         Prior to injection, verified patient identity using patient's name and date of birth.  Due to injection administration, patient instructed to remain in clinic for 15 minutes  afterwards, and to report any adverse reaction to me immediately.    Joint injection was performed.    Medication Name: Lidocaine NDC 08575-696-42  Drug Amount Wasted:  Yes: 11 mg/ml   Vial/Syringe: Single dose vial  Expiration Date:  10/24    Medication Name Kenolog NDC [address]-7    Scribed by Mckayla Espinal ATC for Dr. Tariq on 2023 at 1:18p based on the provider's  statements to me.     SHERRON Terrazas MD  Professor Orthopedic Surgery  [facility]       Patient is a 69 y/o male here for a F/U for his Right total knee arthroplasty DOS 5/11/23.  He did have some issues with wound healing, probable reaction to Exofin, but this healed up eventually.    He loves his new knee and has not complaints.  He is interested in scheduling his LEFT knee total knee arthroplasty next May.    He is leaving for [place] for the winter this weekend.  He would like an injection into his left knee today.    He is trying hard to lose weight.  By our scales today he has not lost any weight but by his scales in his gym and he has lost 20 pounds.  He continues to have a BMI of 36 based on our scales.    In regards to his function he feels like his right knee is doing very well.  His total limitations with his left knee.  He continues to limp at the end of the day and feels that his exercise stamina is limited now by his left knee    PE  RIGHT  knee: ROM 5-120.  Kneecap tracks well through an active arc of motion  Wound slightly keloid but well healed  No knee effusion    Left knee: Fullness of the knee suggestive of synovitis.  Trace fluid wave.    Range of motion .    X-rays were reviewed.  Long-leg alignment view shows neutral alignment and patient's right knee, status post total knee replacement.  Left knee shows near bone-on-bone changes medial compartment with a varus alignment of 11 degrees    Sagittal views right knee show satisfactory component positioning    Assessment: 1.  Excellent early postoperative results right knee  2.  Left knee with varus alignment and decreasing range of motion    Plan: 1.  Left knee injection today with steroids.  The injection be separately dictated  2.  Plan for left total knee replacement next May    Lorena Tariq MD  Professor Orthopedic Surgery  [facility]

## 2023-10-17 NOTE — PROGRESS NOTES
Large Joint Injection/Arthocentesis: L knee joint    Date/Time: 10/17/2023 1:15 PM    Performed by: Lorena Tariq MD  Authorized by: Lorena Tariq MD    Indications:  Pain and osteoarthritis  Needle Size:  21 G  Guidance: landmark guided    Approach:  Medial  Location:  Knee      Medications:  40 mg triamcinolone 40 MG/ML; 9 mL lidocaine (PF) 1 %  Outcome:  Tolerated well, no immediate complications  Procedure discussed: discussed risks, benefits, and alternatives    Consent Given by:  Patient  Timeout: timeout called immediately prior to procedure    Prep: patient was prepped and draped in usual sterile fashion     HISTORY OF PRESENT ILLNESS:  Stephane Shaikh is a 68 year old male with knee pain.  Diagnosis is knee arthritis supported by history, physical exam, and imaging.    PROCEDURE:  After informed verbal and writtten consent, under sterile conditions, patient's left knee was injected with 9 cc of Lidocaine and 1 cc of Kenalog (40 mg/ml).   The injection was done by Dr. Tariq.    Patient had good pain relief upon leaving the clinic, and will follow up with us on an as needed basis.        [facility] [address] [address] [phone]  Dept: [phone]  ______________________________________________________________________________    Patient: Stephane Shaikh   : 1955   MRN: [id number]   2023    INVASIVE PROCEDURE SAFETY CHECKLIST    Date: 10/17/2023   Procedure:Left knee steroid injection  Patient Name: Stephane Shaikh  MRN: [id number]  YOB: 1955    Action: Complete sections as appropriate. Any discrepancy results in a HARD COPY until resolved.     PRE PROCEDURE:  Patient ID verified with 2 identifiers (name and  or MRN): Yes  Procedure and site verified with patient/designee (when able): Yes  Accurate consent documentation in medical record: Yes  H&P (or appropriate assessment)  documented in medical record: NA  H&P must be up to 20 days prior to procedure and updates within 24 hours of procedure as applicable: NA  Relevant diagnostic and radiology test results appropriately labeled and displayed as applicable: Yes  Procedure site(s) marked with provider initials: NA    TIMEOUT:  Time-Out performed immediately prior to starting procedure, including verbal and active participation of all team members addressing the following:Yes  * Correct patient identify  * Confirmed that the correct side and site are marked  * An accurate procedure consent form  * Agreement on the procedure to be done  * Correct patient position  * Relevant images and results are properly labeled and appropriately displayed  * The need to administer antibiotics or fluids for irrigation purposes during the procedure as applicable   * Safety precautions based on patient history or medication use    DURING PROCEDURE: Verification of correct person, site, and procedures any time the responsibility for care of the patient is transferred to another member of the care team.       The following medications were given:         Prior to injection, verified patient identity using patient's name and date of birth.  Due to injection administration, patient instructed to remain in clinic for 15 minutes  afterwards, and to report any adverse reaction to me immediately.    Joint injection was performed.    Medication Name: Lidocaine NDC 80257-828-60  Drug Amount Wasted:  Yes: 11 mg/ml   Vial/Syringe: Single dose vial  Expiration Date:  10/24    Medication Name Kenolog NDC [address]-7    Scribed by Mckayla Espinal ATC for Dr. Tariq on October 17, 2023 at 1:18p based on the provider's statements to me.     SHERRON Terrazas MD  Professor Orthopedic Surgery  [facility]

## 2023-10-24 ENCOUNTER — TELEPHONE (OUTPATIENT)
Dept: ORTHOPEDICS | Facility: CLINIC | Age: 68
End: 2023-10-24

## 2023-10-24 NOTE — TELEPHONE ENCOUNTER
Patient is scheduled for surgery with Dr. Tariq    Spoke with: Sam    Date of Surgery: 5/9/24    Location: [facility]    Informed patient they will need an adult  Yes    Pre op with Provider PCP, patient will schedule    Additional imaging/appointments: POP Made    Surgery packet: Received     Additional comments: N/A        Sweetie Harding on 10/24/2023 at 12:24 PM

## 2023-10-24 NOTE — TELEPHONE ENCOUNTER
Phoned patient to get him scheduled for surgery with Dr. Tariq.    Patient was unavailable,   Provided call back number in voicemail:   [phone] & [phone] for care team.   Will try again.

## 2023-11-28 NOTE — TELEPHONE ENCOUNTER
Rescheduled   Patient is scheduled for surgery with Dr. Tariq    Spoke with: Sam    Date of Surgery: 5/23/24    [facility]    Informed patient they will need an adult  Yes    Pre op with Provider PCP, patient will reschedule    Additional imaging/appointments: POP Made    Additional comments: Reschedule from 5/9/24 to 5/23/24 due to providers change in schedule.         Sweetie Harding on 11/28/2023 at 9:19 AM

## 2023-11-28 NOTE — TELEPHONE ENCOUNTER
Phoned patient to get him rescheduled per Dr. Tariq's change in schedule. Surgeon will now be out on his schedule date.     Call went to voicemail. Provided reason of call and call back number of [phone].

## 2024-02-13 ENCOUNTER — TELEPHONE (OUTPATIENT)
Dept: ORTHOPEDICS | Facility: CLINIC | Age: 69
End: 2024-02-13
Payer: COMMERCIAL

## 2024-04-10 DIAGNOSIS — M17.12 ARTHRITIS OF LEFT KNEE: Primary | ICD-10-CM

## 2024-05-06 ENCOUNTER — OFFICE VISIT (OUTPATIENT)
Dept: FAMILY MEDICINE | Facility: OTHER | Age: 69
End: 2024-05-06
Attending: FAMILY MEDICINE
Payer: COMMERCIAL

## 2024-05-06 VITALS
SYSTOLIC BLOOD PRESSURE: 157 MMHG | TEMPERATURE: 96.5 F | BODY MASS INDEX: 35.42 KG/M2 | RESPIRATION RATE: 18 BRPM | DIASTOLIC BLOOD PRESSURE: 82 MMHG | HEART RATE: 60 BPM | HEIGHT: 71 IN | WEIGHT: 253 LBS | OXYGEN SATURATION: 97 %

## 2024-05-06 DIAGNOSIS — E78.5 DYSLIPIDEMIA: ICD-10-CM

## 2024-05-06 DIAGNOSIS — Z01.818 PREOPERATIVE EXAMINATION: Primary | ICD-10-CM

## 2024-05-06 DIAGNOSIS — E66.01 MORBID OBESITY (H): ICD-10-CM

## 2024-05-06 DIAGNOSIS — I10 BENIGN ESSENTIAL HYPERTENSION: ICD-10-CM

## 2024-05-06 DIAGNOSIS — M17.12 PRIMARY OSTEOARTHRITIS OF LEFT KNEE: ICD-10-CM

## 2024-05-06 LAB
ALBUMIN SERPL BCG-MCNC: 4.6 G/DL (ref 3.5–5.2)
ALP SERPL-CCNC: 83 U/L (ref 40–150)
ALT SERPL W P-5'-P-CCNC: 31 U/L (ref 0–70)
ANION GAP SERPL CALCULATED.3IONS-SCNC: 14 MMOL/L (ref 7–15)
AST SERPL W P-5'-P-CCNC: 43 U/L (ref 0–45)
ATRIAL RATE - MUSE: 62 BPM
BASOPHILS # BLD AUTO: 0.1 10E3/UL (ref 0–0.2)
BASOPHILS NFR BLD AUTO: 1 %
BILIRUB SERPL-MCNC: 0.8 MG/DL
BUN SERPL-MCNC: 28.5 MG/DL (ref 8–23)
CALCIUM SERPL-MCNC: 9.4 MG/DL (ref 8.8–10.2)
CHLORIDE SERPL-SCNC: 103 MMOL/L (ref 98–107)
CREAT SERPL-MCNC: 1.05 MG/DL (ref 0.67–1.17)
DEPRECATED HCO3 PLAS-SCNC: 21 MMOL/L (ref 22–29)
DIASTOLIC BLOOD PRESSURE - MUSE: NORMAL MMHG
EGFRCR SERPLBLD CKD-EPI 2021: 78 ML/MIN/1.73M2
EOSINOPHIL # BLD AUTO: 0.2 10E3/UL (ref 0–0.7)
EOSINOPHIL NFR BLD AUTO: 3 %
ERYTHROCYTE [DISTWIDTH] IN BLOOD BY AUTOMATED COUNT: 12.9 % (ref 10–15)
GLUCOSE SERPL-MCNC: 85 MG/DL (ref 70–99)
HCT VFR BLD AUTO: 43.4 % (ref 40–53)
HGB BLD-MCNC: 14.6 G/DL (ref 13.3–17.7)
IMM GRANULOCYTES # BLD: 0 10E3/UL
IMM GRANULOCYTES NFR BLD: 0 %
INTERPRETATION ECG - MUSE: NORMAL
LYMPHOCYTES # BLD AUTO: 3.2 10E3/UL (ref 0.8–5.3)
LYMPHOCYTES NFR BLD AUTO: 45 %
MCH RBC QN AUTO: 30.5 PG (ref 26.5–33)
MCHC RBC AUTO-ENTMCNC: 33.6 G/DL (ref 31.5–36.5)
MCV RBC AUTO: 91 FL (ref 78–100)
MONOCYTES # BLD AUTO: 0.6 10E3/UL (ref 0–1.3)
MONOCYTES NFR BLD AUTO: 8 %
NEUTROPHILS # BLD AUTO: 3.1 10E3/UL (ref 1.6–8.3)
NEUTROPHILS NFR BLD AUTO: 44 %
NRBC # BLD AUTO: 0 10E3/UL
NRBC BLD AUTO-RTO: 0 /100
P AXIS - MUSE: 24 DEGREES
PLATELET # BLD AUTO: 230 10E3/UL (ref 150–450)
POTASSIUM SERPL-SCNC: 4.5 MMOL/L (ref 3.4–5.3)
PR INTERVAL - MUSE: 270 MS
PROT SERPL-MCNC: 8.2 G/DL (ref 6.4–8.3)
QRS DURATION - MUSE: 104 MS
QT - MUSE: 424 MS
QTC - MUSE: 430 MS
R AXIS - MUSE: -24 DEGREES
RBC # BLD AUTO: 4.78 10E6/UL (ref 4.4–5.9)
SODIUM SERPL-SCNC: 138 MMOL/L (ref 135–145)
SYSTOLIC BLOOD PRESSURE - MUSE: NORMAL MMHG
T AXIS - MUSE: 8 DEGREES
VENTRICULAR RATE- MUSE: 62 BPM
WBC # BLD AUTO: 7.1 10E3/UL (ref 4–11)

## 2024-05-06 PROCEDURE — 93010 ELECTROCARDIOGRAM REPORT: CPT | Mod: 77 | Performed by: INTERNAL MEDICINE

## 2024-05-06 PROCEDURE — 85025 COMPLETE CBC W/AUTO DIFF WBC: CPT | Mod: ZL | Performed by: FAMILY MEDICINE

## 2024-05-06 PROCEDURE — 93005 ELECTROCARDIOGRAM TRACING: CPT | Performed by: FAMILY MEDICINE

## 2024-05-06 PROCEDURE — G0463 HOSPITAL OUTPT CLINIC VISIT: HCPCS

## 2024-05-06 PROCEDURE — 99214 OFFICE O/P EST MOD 30 MIN: CPT | Performed by: FAMILY MEDICINE

## 2024-05-06 PROCEDURE — 82040 ASSAY OF SERUM ALBUMIN: CPT | Mod: ZL | Performed by: FAMILY MEDICINE

## 2024-05-06 PROCEDURE — 36415 COLL VENOUS BLD VENIPUNCTURE: CPT | Mod: ZL | Performed by: FAMILY MEDICINE

## 2024-05-06 RX ORDER — ATORVASTATIN CALCIUM 10 MG/1
10 TABLET, FILM COATED ORAL DAILY
Qty: 90 TABLET | Refills: 0 | Status: SHIPPED | OUTPATIENT
Start: 2024-05-06 | End: 2024-07-11

## 2024-05-06 ASSESSMENT — ENCOUNTER SYMPTOMS
LIGHT-HEADEDNESS: 0
COUGH: 0
FEVER: 0
ABDOMINAL PAIN: 0
CHEST TIGHTNESS: 0
NUMBNESS: 0
PALPITATIONS: 0
SHORTNESS OF BREATH: 0

## 2024-05-06 NOTE — PROGRESS NOTES
Preoperative Evaluation  [facility] [place]  [address]  [place] [address]  Phone: [phone]  Primary Provider: Sixto De Leon  Pre-op Performing Provider: SIXTO DE LEON  May 6, 2024       Sam is a 69 year old, presenting for the following:  Pre-Op Exam        5/6/2024    12:53 PM   Additional Questions   Roomed by Josie Crystal   Accompanied by none         5/6/2024    12:53 PM   Patient Reported Additional Medications   Patient reports taking the following new medications none     Surgical Information  Surgery/Procedure: Left total knee arthroplasty  Surgery Location: [place]  Surgeon: Dr. Tariq  Surgery Date: 5/23/  Time of Surgery: TBD  Where patient plans to recover: At home with family  Fax number for surgical facility: Note does not need to be faxed, will be available electronically in Epic.    Assessment & Plan     The proposed surgical procedure is considered INTERMEDIATE risk.    Preoperative examination  - EKG 12-lead complete w/read - Clinics  - CBC with Platelets & Differential  - Comprehensive metabolic panel    Primary osteoarthritis of left knee    Benign essential hypertension    Dyslipidemia  - atorvastatin (LIPITOR) 10 MG tablet; Take 1 tablet (10 mg) by mouth daily    Morbid obesity (H)              - No identified additional risk factors other than previously addressed    Antiplatelet or Anticoagulation Medication Instructions  Aspirin per surgeons instructions    Additional Medication Instructions  Patient is to take all scheduled medications on the day of surgery unless instructed otherwise by surgeon's office      Recommendation  APPROVAL GIVEN to proceed with proposed procedure, without further diagnostic evaluation.          Subjective       HPI related to upcoming procedure: preop left knee replacement        5/6/2024    12:41 PM   Preop Questions   1. Have you ever had a heart attack or stroke? No   2. Have you ever had surgery on your heart or blood vessels, such  as a stent placement, a coronary artery bypass, or surgery on an artery in your head, neck, heart, or legs? No   3. Do you have chest pain with activity? No   4. Do you have a history of  heart failure? No   5. Do you currently have a cold, bronchitis or symptoms of other infection? No   6. Do you have a cough, shortness of breath, or wheezing? No   7. Do you or anyone in your family have previous history of blood clots? YES - mom   8. Do you or does anyone in your family have a serious bleeding problem such as prolonged bleeding following surgeries or cuts? No   9. Have you ever had problems with anemia or been told to take iron pills? No   10. Have you had any abnormal blood loss such as black, tarry or bloody stools? No   11. Have you ever had a blood transfusion? No   12. Are you willing to have a blood transfusion if it is medically needed before, during, or after your surgery? Yes   13. Have you or any of your relatives ever had problems with anesthesia? No   14. Do you have sleep apnea, excessive snoring or daytime drowsiness? SNORES - never checked for FÁTIMA   15. Do you have any artifical heart valves or other implanted medical devices like a pacemaker, defibrillator, or continuous glucose monitor? No   16. Do you have artificial joints? YES - right knee   17. Are you allergic to latex? No       Health Care Directive  Patient has a Health Care Directive on file      Preoperative Review of    reviewed - no record of controlled substances prescribed.      Status of Chronic Conditions:  Hypertension - not treated - not starting meds immediately before surgery - patient will follow-up post up for meds.  Dyslipidemia - on statin  BMI 35.6  Reported history of heart murmur when he was a kid - no longer heard - no cardiac symptoms      Patient Active Problem List    Diagnosis Date Noted    Class 2 severe obesity due to excess calories with serious comorbidity in adult (H) 10/17/2023     Priority: Medium     Arthrosis of knee 05/11/2023     Priority: Medium    Advance care planning 06/14/2016     Priority: Medium     Advance Care Planning 6/14/2016: Discussed advance care planning with patient; information given to patient to review. June 14, 2016.  Beau Cruz MA              Acoustic neuroma (H) 06/14/2016     Priority: Medium     left      Obesity, Class II, BMI 35-39.9 06/23/2014     Priority: Medium    CARDIOVASCULAR SCREENING; LDL GOAL LESS THAN 160 04/24/2012     Priority: Medium      Past Medical History:   Diagnosis Date    Acoustic neuroma (H) 06/14/2016    left     Benign essential hypertension     Heart murmur     Hyperlipidemia     Obesity, Class III, BMI 40-49.9 (morbid obesity) (H) 06/23/2014    Osteoarthritis     Rheumatic fever      Past Surgical History:   Procedure Laterality Date    ARTHROPLASTY KNEE Right 5/11/2023    Procedure: Total Knee Arthroplasty Right;  Surgeon: Lorena Tariq MD;  [facility]    ARTHROSCOPY KNEE Left     approx 1990    ARTHROSCOPY KNEE RT/LT Right remote    ARTHROSCOPY SHOULDER RT/LT Right 01/01/2011    rotator cuff repair    INJECT STEROID (LOCATION) Left 5/11/2023    Procedure: Inject steroid knee;  Surgeon: Lorena Tariq MD;  [facility]    TONSILLECTOMY      VASECTOMY       Current Outpatient Medications   Medication Sig Dispense Refill    aspirin 81 MG EC tablet Take 1 tablet (81 mg) by mouth 2 times daily (Patient taking differently: Take 81 mg by mouth daily) 60 tablet 0    atorvastatin (LIPITOR) 10 MG tablet Take 1 tablet (10 mg) by mouth daily 90 tablet 3    glucosamine 500 MG CAPS capsule Take 1,000 mg by mouth daily      Melatonin 10 MG TABS tablet Take 10 mg by mouth nightly as needed for sleep      multivitamin w/minerals (MULTI-VITAMIN) tablet Take 1 tablet by mouth daily      Omega-3 Fatty Acids (FISH OIL) 1200 MG capsule Take 1,200 mg by mouth daily      Wound Dressings (MEDIHONEY WOUND/BURN DRESSING) paste Apply topically daily Small  "pea sized amount to open wounds with daily dressing changes. 15 mL 1    acetaminophen (TYLENOL) 325 MG tablet Take 2 tablets (650 mg) by mouth every 4 hours as needed for other (mild pain) 100 tablet 0    ondansetron (ZOFRAN ODT) 4 MG ODT tab Take 1-2 tablets (4-8 mg) by mouth every 8 hours as needed for nausea Dissolve ON the tongue. 10 tablet 0    oxyCODONE (ROXICODONE) 5 MG tablet Take 1-2 tablets (5-10 mg) by mouth every 4 hours as needed for moderate to severe pain 26 tablet 0    polyethylene glycol (MIRALAX) 17 g packet Take 17 g by mouth daily 7 packet 0    senna-docusate (SENOKOT-S/PERICOLACE) 8.6-50 MG tablet Take 1-2 tablets by mouth 2 times daily Take while on oral narcotics to prevent or treat constipation. 30 tablet 0    tiZANidine (ZANAFLEX) 4 MG tablet Take 0.5 tablets (2 mg) by mouth 3 times daily as needed for muscle spasms 30 tablet 0       No Known Allergies     Social History     Tobacco Use    Smoking status: Never    Smokeless tobacco: Never   Substance Use Topics    Alcohol use: Yes     Alcohol/week: 0.0 - 1.0 standard drinks of alcohol       History   Drug Use No         Review of Systems   Constitutional:  Negative for fever.   Respiratory:  Negative for cough, chest tightness and shortness of breath.    Cardiovascular:  Negative for chest pain, palpitations and peripheral edema.   Gastrointestinal:  Negative for abdominal pain.   Neurological:  Negative for light-headedness and numbness.         Objective    BP (!) 157/82 (BP Location: Left arm, Patient Position: Sitting, Cuff Size: Adult Large)   Pulse 60   Temp (!) 96.5  F (35.8  C) (Tympanic)   Resp 18   Ht 1.795 m (5' 10.67\")   Wt 114.8 kg (253 lb)   SpO2 97%   BMI 35.62 kg/m     Estimated body mass index is 35.62 kg/m  as calculated from the following:    Height as of this encounter: 1.795 m (5' 10.67\").    Weight as of this encounter: 114.8 kg (253 lb).  Physical Exam  Constitutional:       General: He is not in acute " distress.     Appearance: Normal appearance.   HENT:      Head: Normocephalic and atraumatic.      Right Ear: Tympanic membrane normal.      Left Ear: Tympanic membrane normal.      Mouth/Throat:      Mouth: Mucous membranes are moist.      Pharynx: Oropharynx is clear.   Eyes:      Conjunctiva/sclera: Conjunctivae normal.      Pupils: Pupils are equal, round, and reactive to light.   Neck:      Vascular: No carotid bruit.   Cardiovascular:      Rate and Rhythm: Normal rate and regular rhythm.      Heart sounds: Normal heart sounds. No murmur heard.  Pulmonary:      Effort: Pulmonary effort is normal.      Breath sounds: Normal breath sounds.   Abdominal:      General: Bowel sounds are normal. There is no distension.      Palpations: Abdomen is soft.      Tenderness: There is no abdominal tenderness. There is no guarding.   Musculoskeletal:      Cervical back: Normal range of motion.      Right lower leg: No edema.      Left lower leg: No edema.   Lymphadenopathy:      Cervical: No cervical adenopathy.   Neurological:      Mental Status: He is alert and oriented to person, place, and time.           Recent Labs   Lab Test 06/27/23  1412 05/12/23  0813 05/02/23  1026   HGB  --  12.4* 14.5   PLT  --   --  236   NA  --   --  140   POTASSIUM  --   --  4.0   CR  --   --  1.06   A1C 5.3  --   --         Diagnostics  Recent Results (from the past 168 hour(s))   Comprehensive metabolic panel    Collection Time: 05/06/24  1:04 PM   Result Value Ref Range    Sodium 138 135 - 145 mmol/L    Potassium 4.5 3.4 - 5.3 mmol/L    Carbon Dioxide (CO2) 21 (L) 22 - 29 mmol/L    Anion Gap 14 7 - 15 mmol/L    Urea Nitrogen 28.5 (H) 8.0 - 23.0 mg/dL    Creatinine 1.05 0.67 - 1.17 mg/dL    GFR Estimate 78 >60 mL/min/1.73m2    Calcium 9.4 8.8 - 10.2 mg/dL    Chloride 103 98 - 107 mmol/L    Glucose 85 70 - 99 mg/dL    Alkaline Phosphatase 83 40 - 150 U/L    AST 43 0 - 45 U/L    ALT 31 0 - 70 U/L    Protein Total 8.2 6.4 - 8.3 g/dL    Albumin  4.6 3.5 - 5.2 g/dL    Bilirubin Total 0.8 <=1.2 mg/dL   CBC with platelets and differential    Collection Time: 05/06/24  1:04 PM   Result Value Ref Range    WBC Count 7.1 4.0 - 11.0 10e3/uL    RBC Count 4.78 4.40 - 5.90 10e6/uL    Hemoglobin 14.6 13.3 - 17.7 g/dL    Hematocrit 43.4 40.0 - 53.0 %    MCV 91 78 - 100 fL    MCH 30.5 26.5 - 33.0 pg    MCHC 33.6 31.5 - 36.5 g/dL    RDW 12.9 10.0 - 15.0 %    Platelet Count 230 150 - 450 10e3/uL    % Neutrophils 44 %    % Lymphocytes 45 %    % Monocytes 8 %    % Eosinophils 3 %    % Basophils 1 %    % Immature Granulocytes 0 %    NRBCs per 100 WBC 0 <1 /100    Absolute Neutrophils 3.1 1.6 - 8.3 10e3/uL    Absolute Lymphocytes 3.2 0.8 - 5.3 10e3/uL    Absolute Monocytes 0.6 0.0 - 1.3 10e3/uL    Absolute Eosinophils 0.2 0.0 - 0.7 10e3/uL    Absolute Basophils 0.1 0.0 - 0.2 10e3/uL    Absolute Immature Granulocytes 0.0 <=0.4 10e3/uL    Absolute NRBCs 0.0 10e3/uL   EKG 12-lead complete w/read - Clinics    Collection Time: 05/06/24  1:13 PM   Result Value Ref Range    Systolic Blood Pressure  mmHg    Diastolic Blood Pressure  mmHg    Ventricular Rate 62 BPM    Atrial Rate 62 BPM    SC Interval 270 ms    QRS Duration 104 ms     ms    QTc 430 ms    P Axis 24 degrees    R AXIS -24 degrees    T Axis 8 degrees    Interpretation ECG       Sinus rhythm with 1st degree A-V block  Minimal voltage criteria for LVH, may be normal variant ( R in aVL )  Cannot rule out Anterior infarct , age undetermined  Abnormal ECG  No previous ECGs available  Confirmed by MD Tevin, Jerry ([id number]) on 5/6/2024 3:34:01 PM                       Signed Electronically by: MENDEZ DE LEON DO  Copy of this evaluation report is provided to requesting physician.

## 2024-05-07 DIAGNOSIS — M17.12 ARTHRITIS OF LEFT KNEE: Primary | ICD-10-CM

## 2024-05-14 ENCOUNTER — OFFICE VISIT (OUTPATIENT)
Dept: ORTHOPEDICS | Facility: CLINIC | Age: 69
End: 2024-05-14
Payer: COMMERCIAL

## 2024-05-14 ENCOUNTER — ANCILLARY PROCEDURE (OUTPATIENT)
Dept: GENERAL RADIOLOGY | Facility: CLINIC | Age: 69
End: 2024-05-14
Attending: ORTHOPAEDIC SURGERY
Payer: COMMERCIAL

## 2024-05-14 VITALS — HEIGHT: 71 IN | WEIGHT: 251 LBS | BODY MASS INDEX: 35.14 KG/M2

## 2024-05-14 DIAGNOSIS — M17.12 ARTHRITIS OF LEFT KNEE: ICD-10-CM

## 2024-05-14 DIAGNOSIS — Z96.651 STATUS POST TOTAL RIGHT KNEE REPLACEMENT: Primary | ICD-10-CM

## 2024-05-14 PROCEDURE — 73560 X-RAY EXAM OF KNEE 1 OR 2: CPT | Mod: LT | Performed by: RADIOLOGY

## 2024-05-14 PROCEDURE — 99214 OFFICE O/P EST MOD 30 MIN: CPT | Performed by: ORTHOPAEDIC SURGERY

## 2024-05-14 ASSESSMENT — KOOS JR
HOW SEVERE IS YOUR KNEE STIFFNESS AFTER FIRST WAKING IN MORNING: SEVERE
GOING UP OR DOWN STAIRS: MODERATE
STANDING UPRIGHT: MODERATE
KOOS JR SCORING: 54.84
BENDING TO THE FLOOR TO PICK UP OBJECT: MILD
TWISING OR PIVOTING ON KNEE: MODERATE
STRAIGHTENING KNEE FULLY: MODERATE
RISING FROM SITTING: MILD

## 2024-05-14 NOTE — LETTER
5/14/2024         RE: Stephane Shaikh  [address] [facility] [address]        Dear Colleague,    Thank you for referring your patient, Stephane Shaikh, to the [facility] [place]. Please see a copy of my visit note below.    Patient is a 69-year-old male who is here as a preoperative visit.  He is now 1 year status post a right total knee arthroplasty (DOS 5/11/2023).    He is still delighted with the results of that arthroplasty.  He had some problems with wound healing on the proximal aspect of the wound that was associated with blistering.  We felt at the time that it was related to the Exofin our type of wound closure and we are keeping this in mind for his opposite knee.  Other than that he had no problems with getting back his range of motion and he is happy with his results today.    He is here as a preoperative discussion for his left knee (anticipated DOS 5/23/2024)    Physical exam of patient's right knee reveals benign-appearing.  Trace effusion is present.  Good straight leg raising effort without a lag.  Range of motion 0-1 20.  There is a slight squeak from deep flexion to extension.  This is not always present.    Examination of patient's left knee reveals mild fullness about the knee without a violetta fluid wave.  Range of motion 10-1 or 3 with a firm endpoint.  A varus alignment is noted that is not passively corrected  CMS checks are intact on the left lower limb.    Long-leg AP alignment films taken in October 2023 reveal:   Right limb 2 degree varus alignment with a otherwise satisfactory component positioning in place  Left limb weightbearing line falls in zone 4 with a 10 degree, varus mechanical axis    Assessment: 1.  Satisfactory 1 year status post right total knee arthroplasty  2.  Symptomatic left knee medial compartment disease with varus alignment    Plan: Proceed with left total knee arthroplasty.    Lorena Tariq MD  Professor Orthopedic  Surgery  [facility]

## 2024-05-14 NOTE — NURSING NOTE
"Reason For Visit:   Chief Complaint   Patient presents with    RECHECK     follow up before surgery       Primary MD: Sixto Tran  Ref. MD: EST    ?  No  Occupation retired.     Date of injury: No  Type of injury: No.     Date of surgery: 30 years ago  Type of surgery: scope L knee  Date of surgery: 5/11/23  Total knee arthoplasty right  Left knee intra -articular knee injection (Nancyog)     Smoker: No  Request smoking cessation information: No    Ht 1.8 m (5' 10.87\")   Wt 113.9 kg (251 lb)   BMI 35.14 kg/m      Pain Assessment  Patient Currently in Pain: Yes  0-10 Pain Scale: 1 (discomfort)  Primary Pain Location: Knee          Susan Formato, LPN   "

## 2024-05-14 NOTE — NURSING NOTE
Pre-Op Teaching was done in person at the clinic.    Teaching Flowsheet   Relevant Diagnosis: Pre-Op Teaching  Teaching Topic: TKA Pre-Op     Person(s) involved in teaching:   Patient and Wife     Motivation Level:  Asks Questions: Yes  Eager to Learn: Yes  Cooperative: Yes  Receptive (willing/able to accept information): Yes  Any cultural factors/Temple beliefs that may influence understanding or compliance? No     Patient demonstrates understanding of the following:  Reason for the appointment, diagnosis and treatment plan: Yes  Knowledge of proper use of medications and conditions for which they are ordered (with special attention to potential side effects or drug interactions): Yes  Which situations necessitate calling provider and whom to contact: Yes- discussed the stoplight tool to help assist with this.      Teaching Concerns Addressed:      Proper use of surgical scrub explain: Yes    Nutritional needs and diet plan: Yes  Pain management techniques: Yes  Wound Care: Yes  How and/when to access community resources: Yes     Instructional Materials Used/Given:  2 bottle of chlorhexidine, a surgery packet, and a joint booklet given to patient in clinic.      - Important contact info/ phone numbers: emphasizing clinic number and after hours number  - Map/ location of surgery  - Medications to avoid  - Showering instructions  - Stop light tool  - Your Guide to Joint Replacement Booklet   - Antibiotic post op before every dentist appointment, procedure, or surgery for the rest of their life.     Additionally the following was discussed with patient:  - Wife will be driving the patient to surgery and able to pick the patient up after discharge and staying with them for 4-5 days after surgery.  - Need to schedule a dentist appointment and get done any recommended dental work prior to surgery.   - Online joint replacement call and the use of [employer] to send educational messages. Asked patient to sign up for join  class during visit and patient declined to sign up at this time and stated will sign up later. Sheet with sign up instruction given to patient.   - Told patient to check in with insurance to check about coverage.      -Next step: pre op completed and Surgery date: scheduled    Time spent with patient: 15 minutes.

## 2024-05-14 NOTE — PROGRESS NOTES
Patient is a 69-year-old male who is here as a preoperative visit.  He is now 1 year status post a right total knee arthroplasty (DOS 5/11/2023).    He is still delighted with the results of that arthroplasty.  He had some problems with wound healing on the proximal aspect of the wound that was associated with blistering.  We felt at the time that it was related to the Exofin our type of wound closure and we are keeping this in mind for his opposite knee.  Other than that he had no problems with getting back his range of motion and he is happy with his results today.    He is here as a preoperative discussion for his left knee (anticipated DOS 5/23/2024)    Physical exam of patient's right knee reveals benign-appearing.  Trace effusion is present.  Good straight leg raising effort without a lag.  Range of motion 0-1 20.  There is a slight squeak from deep flexion to extension.  This is not always present.    Examination of patient's left knee reveals mild fullness about the knee without a violetta fluid wave.  Range of motion 10-1 or 3 with a firm endpoint.  A varus alignment is noted that is not passively corrected  CMS checks are intact on the left lower limb.    Long-leg AP alignment films taken in October 2023 reveal:   Right limb 2 degree varus alignment with a otherwise satisfactory component positioning in place  Left limb weightbearing line falls in zone 4 with a 10 degree, varus mechanical axis    Assessment: 1.  Satisfactory 1 year status post right total knee arthroplasty  2.  Symptomatic left knee medial compartment disease with varus alignment    Plan: Proceed with left total knee arthroplasty.    Lorena Tariq MD  Professor Orthopedic Surgery  [facility]

## 2024-05-15 NOTE — PROGRESS NOTES
Ortho Navigator Note      Pre-op Date 5/6/24 [facility]       Medical Clearance  Cleared      Labs WNR       COVID Test Date No longer indicated       Skin  Intact       Activity: Ambulates independently without assistive device       Equipment Need Patient will likely need a walker for discharge. Defer to PT/OT for recs.        Meds to Hold Held all supplements 14 days prior to surgery  Hold ASA for 7 days prior to surgery   Hold ibuprofen 24 hr prior to surgery   * Medication recommendations are not intended to be exhaustive; they are limited to common medications that are potentially dangerous if incorrectly managed   NPO Instructions  Defer to PAN RN      Pre-op Joint Education Complete? Complete   Discharge Plan Patient has plan to discharge to daughter's home on morning of POD 1. She lives in the [facility]. Daughters boyfriend will arrive at hospital at 10 am to transport patient home. He will stay with family for a few days before discharging back to his home in [facility]. He is currently living in a large motor home while his home is being renovated. He has a portable bathroom rented and it sits just outside motor home. His steps into motor home have railings in place. The shower has a bench.    /Transportation Daughter will be  for the first few days and then wife will be  when patient returns home.  is physically able to care for patient.       Barriers to Discharge No barriers to discharge.       Additional Info/   Special Needs : Patient had no unanswered questions or concerns.              05/15/24 0647   Discharge Planning   Patient/Family Anticipates Transition to home with family   Concerns to be Addressed no discharge needs identified   Living Arrangements   People in Home spouse   Type of Residence Private Residence   Is your private residence a single family home or apartment?   (Living in 5th wheel camper while house is being built.)   Number of Stairs, Within Home, Primary  three   Stair Railings, Within Home, Primary railings safe and in good condition   Once home, are you able to live on one level? Yes   Which level? Main Level   Bathroom Shower/Tub Walk-in shower   Equipment Currently Used at Home none   Support System   Support Systems Children   Do you have someone available to stay with you one or two nights once you are home? Yes   Medical Clearance   Date of Physical 05/06/24   Clinic Name [facility]   It is recommended that you call and check with any specialty providers before surgery to see if you need surgical clearance.  Do you see any specialty providers outside of your primary care provider? No   Blood   Known Bleeding Disorder or Coagulopathy No   Does the patient have any Gnosticism/cultural preferences related to blood products? No   Education   Has the patient scheduled or completed pre-op total joint education, either in class or online, in the last 12 months? Yes   Patient attended total joint pre-op class/received pre-op teaching  online

## 2024-05-21 ENCOUNTER — MEDICAL CORRESPONDENCE (OUTPATIENT)
Dept: HEALTH INFORMATION MANAGEMENT | Facility: CLINIC | Age: 69
End: 2024-05-21
Payer: COMMERCIAL

## 2024-05-22 ENCOUNTER — ANESTHESIA EVENT (OUTPATIENT)
Dept: SURGERY | Facility: CLINIC | Age: 69
End: 2024-05-22
Payer: MEDICARE

## 2024-05-23 ENCOUNTER — HOSPITAL ENCOUNTER (OUTPATIENT)
Facility: CLINIC | Age: 69
LOS: 1 days | Discharge: HOME | End: 2024-05-24
Attending: ORTHOPAEDIC SURGERY | Admitting: ORTHOPAEDIC SURGERY
Payer: MEDICARE

## 2024-05-23 ENCOUNTER — ANESTHESIA (OUTPATIENT)
Dept: SURGERY | Facility: CLINIC | Age: 69
End: 2024-05-23
Payer: MEDICARE

## 2024-05-23 ENCOUNTER — APPOINTMENT (OUTPATIENT)
Dept: PHYSICAL THERAPY | Facility: CLINIC | Age: 69
End: 2024-05-23
Attending: ORTHOPAEDIC SURGERY
Payer: MEDICARE

## 2024-05-23 ENCOUNTER — APPOINTMENT (OUTPATIENT)
Dept: GENERAL RADIOLOGY | Facility: CLINIC | Age: 69
End: 2024-05-23
Attending: PHYSICIAN ASSISTANT
Payer: MEDICARE

## 2024-05-23 DIAGNOSIS — Z96.652 S/P TOTAL KNEE ARTHROPLASTY, LEFT: ICD-10-CM

## 2024-05-23 DIAGNOSIS — Z96.652 STATUS POST TOTAL LEFT KNEE REPLACEMENT: Primary | ICD-10-CM

## 2024-05-23 PROBLEM — Z96.659 S/P TOTAL KNEE ARTHROPLASTY: Status: ACTIVE | Noted: 2024-05-23

## 2024-05-23 LAB — GLUCOSE BLDC GLUCOMTR-MCNC: 99 MG/DL (ref 70–99)

## 2024-05-23 PROCEDURE — 27447 TOTAL KNEE ARTHROPLASTY: CPT | Performed by: ANESTHESIOLOGY

## 2024-05-23 PROCEDURE — 999N000141 HC STATISTIC PRE-PROCEDURE NURSING ASSESSMENT: Performed by: ORTHOPAEDIC SURGERY

## 2024-05-23 PROCEDURE — 258N000003 HC RX IP 258 OP 636: Performed by: PHYSICIAN ASSISTANT

## 2024-05-23 PROCEDURE — 27447 TOTAL KNEE ARTHROPLASTY: CPT | Mod: LT | Performed by: ORTHOPAEDIC SURGERY

## 2024-05-23 PROCEDURE — 250N000011 HC RX IP 250 OP 636: Performed by: PHYSICIAN ASSISTANT

## 2024-05-23 PROCEDURE — 99215 OFFICE O/P EST HI 40 MIN: CPT | Performed by: PHYSICIAN ASSISTANT

## 2024-05-23 PROCEDURE — C1713 ANCHOR/SCREW BN/BN,TIS/BN: HCPCS | Performed by: ORTHOPAEDIC SURGERY

## 2024-05-23 PROCEDURE — 999N000065 XR KNEE PORT LEFT 1/2 VIEWS: Mod: LT

## 2024-05-23 PROCEDURE — 250N000011 HC RX IP 250 OP 636: Performed by: ANESTHESIOLOGY

## 2024-05-23 PROCEDURE — 360N000084 HC SURGERY LEVEL 4 W/ FLUORO, PER MIN: Performed by: ORTHOPAEDIC SURGERY

## 2024-05-23 PROCEDURE — 97116 GAIT TRAINING THERAPY: CPT | Mod: GP,GZ

## 2024-05-23 PROCEDURE — C1776 JOINT DEVICE (IMPLANTABLE): HCPCS | Performed by: ORTHOPAEDIC SURGERY

## 2024-05-23 PROCEDURE — 250N000013 HC RX MED GY IP 250 OP 250 PS 637: Performed by: PHYSICIAN ASSISTANT

## 2024-05-23 PROCEDURE — 250N000009 HC RX 250

## 2024-05-23 PROCEDURE — 258N000003 HC RX IP 258 OP 636

## 2024-05-23 PROCEDURE — P9045 ALBUMIN (HUMAN), 5%, 250 ML: HCPCS | Mod: JZ

## 2024-05-23 PROCEDURE — 97530 THERAPEUTIC ACTIVITIES: CPT | Mod: GP

## 2024-05-23 PROCEDURE — 250N000011 HC RX IP 250 OP 636

## 2024-05-23 PROCEDURE — 272N000001 HC OR GENERAL SUPPLY STERILE: Performed by: ORTHOPAEDIC SURGERY

## 2024-05-23 PROCEDURE — 370N000017 HC ANESTHESIA TECHNICAL FEE, PER MIN: Performed by: ORTHOPAEDIC SURGERY

## 2024-05-23 PROCEDURE — 27447 TOTAL KNEE ARTHROPLASTY: CPT

## 2024-05-23 PROCEDURE — 710N000010 HC RECOVERY PHASE 1, LEVEL 2, PER MIN: Performed by: ORTHOPAEDIC SURGERY

## 2024-05-23 PROCEDURE — 97161 PT EVAL LOW COMPLEX 20 MIN: CPT | Mod: GP

## 2024-05-23 DEVICE — POSTERIOR STABILIZED FEMORAL
Type: IMPLANTABLE DEVICE | Site: KNEE | Status: FUNCTIONAL
Brand: TRIATHLON

## 2024-05-23 DEVICE — PRIMARY TIBIAL BASEPLATE
Type: IMPLANTABLE DEVICE | Site: KNEE | Status: FUNCTIONAL
Brand: TRIATHLON

## 2024-05-23 DEVICE — SPEEDSET FULL DOSE ANTIBIOTIC BONE CEMENT, 10 PACK CATALOG NUMBER IS 6192-1-010
Type: IMPLANTABLE DEVICE | Site: KNEE | Status: FUNCTIONAL
Brand: SIMPLEX

## 2024-05-23 DEVICE — TIBIAL BEARING INSERT - PS
Type: IMPLANTABLE DEVICE | Site: KNEE | Status: FUNCTIONAL
Brand: TRIATHLON

## 2024-05-23 DEVICE — ASYMMETRIC PATELLA
Type: IMPLANTABLE DEVICE | Site: KNEE | Status: FUNCTIONAL
Brand: TRIATHLON

## 2024-05-23 DEVICE — FEMORAL DISTAL FIXATION PEG
Type: IMPLANTABLE DEVICE | Site: KNEE | Status: FUNCTIONAL
Brand: TRIATHLON

## 2024-05-23 RX ORDER — SODIUM CHLORIDE, SODIUM LACTATE, POTASSIUM CHLORIDE, CALCIUM CHLORIDE 600; 310; 30; 20 MG/100ML; MG/100ML; MG/100ML; MG/100ML
INJECTION, SOLUTION INTRAVENOUS CONTINUOUS
Status: DISCONTINUED | OUTPATIENT
Start: 2024-05-23 | End: 2024-05-24 | Stop reason: HOSPADM

## 2024-05-23 RX ORDER — FENTANYL CITRATE 50 UG/ML
25 INJECTION, SOLUTION INTRAMUSCULAR; INTRAVENOUS EVERY 5 MIN PRN
Status: DISCONTINUED | OUTPATIENT
Start: 2024-05-23 | End: 2024-05-23 | Stop reason: HOSPADM

## 2024-05-23 RX ORDER — FENTANYL CITRATE 50 UG/ML
INJECTION, SOLUTION INTRAMUSCULAR; INTRAVENOUS PRN
Status: DISCONTINUED | OUTPATIENT
Start: 2024-05-23 | End: 2024-05-23

## 2024-05-23 RX ORDER — HYDROMORPHONE HYDROCHLORIDE 1 MG/ML
0.2 INJECTION, SOLUTION INTRAMUSCULAR; INTRAVENOUS; SUBCUTANEOUS EVERY 5 MIN PRN
Status: DISCONTINUED | OUTPATIENT
Start: 2024-05-23 | End: 2024-05-23 | Stop reason: HOSPADM

## 2024-05-23 RX ORDER — ASPIRIN 81 MG/1
81 TABLET ORAL 2 TIMES DAILY
Qty: 56 TABLET | Refills: 0 | Status: SHIPPED | OUTPATIENT
Start: 2024-05-24 | End: 2024-06-21

## 2024-05-23 RX ORDER — NALOXONE HYDROCHLORIDE 0.4 MG/ML
0.4 INJECTION, SOLUTION INTRAMUSCULAR; INTRAVENOUS; SUBCUTANEOUS
Status: DISCONTINUED | OUTPATIENT
Start: 2024-05-23 | End: 2024-05-24 | Stop reason: HOSPADM

## 2024-05-23 RX ORDER — TRANEXAMIC ACID 650 MG/1
1950 TABLET ORAL ONCE
Status: COMPLETED | OUTPATIENT
Start: 2024-05-23 | End: 2024-05-23

## 2024-05-23 RX ORDER — NALOXONE HYDROCHLORIDE 0.4 MG/ML
0.1 INJECTION, SOLUTION INTRAMUSCULAR; INTRAVENOUS; SUBCUTANEOUS
Status: DISCONTINUED | OUTPATIENT
Start: 2024-05-23 | End: 2024-05-23 | Stop reason: HOSPADM

## 2024-05-23 RX ORDER — ONDANSETRON 2 MG/ML
4 INJECTION INTRAMUSCULAR; INTRAVENOUS EVERY 6 HOURS PRN
Status: DISCONTINUED | OUTPATIENT
Start: 2024-05-23 | End: 2024-05-24 | Stop reason: HOSPADM

## 2024-05-23 RX ORDER — ACETAMINOPHEN 325 MG/1
650 TABLET ORAL EVERY 4 HOURS PRN
Status: DISCONTINUED | OUTPATIENT
Start: 2024-05-26 | End: 2024-05-24 | Stop reason: HOSPADM

## 2024-05-23 RX ORDER — LIDOCAINE HYDROCHLORIDE 20 MG/ML
INJECTION, SOLUTION INFILTRATION; PERINEURAL PRN
Status: DISCONTINUED | OUTPATIENT
Start: 2024-05-23 | End: 2024-05-23

## 2024-05-23 RX ORDER — AMOXICILLIN 250 MG
1 CAPSULE ORAL 2 TIMES DAILY
Status: DISCONTINUED | OUTPATIENT
Start: 2024-05-23 | End: 2024-05-24 | Stop reason: HOSPADM

## 2024-05-23 RX ORDER — OXYCODONE HYDROCHLORIDE 10 MG/1
10 TABLET ORAL
Status: DISCONTINUED | OUTPATIENT
Start: 2024-05-23 | End: 2024-05-23 | Stop reason: HOSPADM

## 2024-05-23 RX ORDER — ONDANSETRON 4 MG/1
4 TABLET, ORALLY DISINTEGRATING ORAL EVERY 30 MIN PRN
Status: DISCONTINUED | OUTPATIENT
Start: 2024-05-23 | End: 2024-05-23 | Stop reason: HOSPADM

## 2024-05-23 RX ORDER — HYDRALAZINE HYDROCHLORIDE 20 MG/ML
2.5-5 INJECTION INTRAMUSCULAR; INTRAVENOUS EVERY 10 MIN PRN
Status: DISCONTINUED | OUTPATIENT
Start: 2024-05-23 | End: 2024-05-23 | Stop reason: HOSPADM

## 2024-05-23 RX ORDER — NALOXONE HYDROCHLORIDE 0.4 MG/ML
0.2 INJECTION, SOLUTION INTRAMUSCULAR; INTRAVENOUS; SUBCUTANEOUS
Status: DISCONTINUED | OUTPATIENT
Start: 2024-05-23 | End: 2024-05-24 | Stop reason: HOSPADM

## 2024-05-23 RX ORDER — OXYCODONE HYDROCHLORIDE 5 MG/1
5 TABLET ORAL
Status: DISCONTINUED | OUTPATIENT
Start: 2024-05-23 | End: 2024-05-23 | Stop reason: HOSPADM

## 2024-05-23 RX ORDER — POLYETHYLENE GLYCOL 3350 17 G/17G
17 POWDER, FOR SOLUTION ORAL DAILY
Status: DISCONTINUED | OUTPATIENT
Start: 2024-05-24 | End: 2024-05-24 | Stop reason: HOSPADM

## 2024-05-23 RX ORDER — SODIUM CHLORIDE, SODIUM LACTATE, POTASSIUM CHLORIDE, CALCIUM CHLORIDE 600; 310; 30; 20 MG/100ML; MG/100ML; MG/100ML; MG/100ML
INJECTION, SOLUTION INTRAVENOUS CONTINUOUS
Status: DISCONTINUED | OUTPATIENT
Start: 2024-05-23 | End: 2024-05-23 | Stop reason: HOSPADM

## 2024-05-23 RX ORDER — ONDANSETRON 2 MG/ML
INJECTION INTRAMUSCULAR; INTRAVENOUS PRN
Status: DISCONTINUED | OUTPATIENT
Start: 2024-05-23 | End: 2024-05-23

## 2024-05-23 RX ORDER — PROPOFOL 10 MG/ML
INJECTION, EMULSION INTRAVENOUS CONTINUOUS PRN
Status: DISCONTINUED | OUTPATIENT
Start: 2024-05-23 | End: 2024-05-23

## 2024-05-23 RX ORDER — HYDROMORPHONE HYDROCHLORIDE 2 MG/1
4 TABLET ORAL EVERY 4 HOURS PRN
Status: DISCONTINUED | OUTPATIENT
Start: 2024-05-23 | End: 2024-05-24 | Stop reason: HOSPADM

## 2024-05-23 RX ORDER — ONDANSETRON 4 MG/1
4 TABLET, ORALLY DISINTEGRATING ORAL EVERY 6 HOURS PRN
Status: DISCONTINUED | OUTPATIENT
Start: 2024-05-23 | End: 2024-05-24 | Stop reason: HOSPADM

## 2024-05-23 RX ORDER — DIAZEPAM 10 MG/2ML
2.5 INJECTION, SOLUTION INTRAMUSCULAR; INTRAVENOUS
Status: DISCONTINUED | OUTPATIENT
Start: 2024-05-23 | End: 2024-05-23 | Stop reason: HOSPADM

## 2024-05-23 RX ORDER — HYDROMORPHONE HYDROCHLORIDE 1 MG/ML
0.4 INJECTION, SOLUTION INTRAMUSCULAR; INTRAVENOUS; SUBCUTANEOUS EVERY 5 MIN PRN
Status: DISCONTINUED | OUTPATIENT
Start: 2024-05-23 | End: 2024-05-23 | Stop reason: HOSPADM

## 2024-05-23 RX ORDER — BUPIVACAINE HYDROCHLORIDE 7.5 MG/ML
INJECTION, SOLUTION INTRASPINAL
Status: COMPLETED | OUTPATIENT
Start: 2024-05-23 | End: 2024-05-23

## 2024-05-23 RX ORDER — ONDANSETRON 2 MG/ML
4 INJECTION INTRAMUSCULAR; INTRAVENOUS EVERY 30 MIN PRN
Status: DISCONTINUED | OUTPATIENT
Start: 2024-05-23 | End: 2024-05-23 | Stop reason: HOSPADM

## 2024-05-23 RX ORDER — SODIUM CHLORIDE, SODIUM LACTATE, POTASSIUM CHLORIDE, CALCIUM CHLORIDE 600; 310; 30; 20 MG/100ML; MG/100ML; MG/100ML; MG/100ML
INJECTION, SOLUTION INTRAVENOUS CONTINUOUS PRN
Status: DISCONTINUED | OUTPATIENT
Start: 2024-05-23 | End: 2024-05-23

## 2024-05-23 RX ORDER — CEFAZOLIN SODIUM/WATER 2 G/20 ML
2 SYRINGE (ML) INTRAVENOUS SEE ADMIN INSTRUCTIONS
Status: DISCONTINUED | OUTPATIENT
Start: 2024-05-23 | End: 2024-05-23 | Stop reason: HOSPADM

## 2024-05-23 RX ORDER — HYDROMORPHONE HYDROCHLORIDE 1 MG/ML
0.4 INJECTION, SOLUTION INTRAMUSCULAR; INTRAVENOUS; SUBCUTANEOUS
Status: DISCONTINUED | OUTPATIENT
Start: 2024-05-23 | End: 2024-05-24 | Stop reason: HOSPADM

## 2024-05-23 RX ORDER — DEXAMETHASONE SODIUM PHOSPHATE 4 MG/ML
4 INJECTION, SOLUTION INTRA-ARTICULAR; INTRALESIONAL; INTRAMUSCULAR; INTRAVENOUS; SOFT TISSUE
Status: DISCONTINUED | OUTPATIENT
Start: 2024-05-23 | End: 2024-05-23 | Stop reason: HOSPADM

## 2024-05-23 RX ORDER — FENTANYL CITRATE 50 UG/ML
50 INJECTION, SOLUTION INTRAMUSCULAR; INTRAVENOUS EVERY 5 MIN PRN
Status: DISCONTINUED | OUTPATIENT
Start: 2024-05-23 | End: 2024-05-23 | Stop reason: HOSPADM

## 2024-05-23 RX ORDER — ACETAMINOPHEN 325 MG/1
975 TABLET ORAL ONCE
Status: DISCONTINUED | OUTPATIENT
Start: 2024-05-23 | End: 2024-05-23 | Stop reason: HOSPADM

## 2024-05-23 RX ORDER — ACETAMINOPHEN 325 MG/1
975 TABLET ORAL ONCE
Status: COMPLETED | OUTPATIENT
Start: 2024-05-23 | End: 2024-05-23

## 2024-05-23 RX ORDER — BISACODYL 10 MG
10 SUPPOSITORY, RECTAL RECTAL DAILY PRN
Status: DISCONTINUED | OUTPATIENT
Start: 2024-05-23 | End: 2024-05-24 | Stop reason: HOSPADM

## 2024-05-23 RX ORDER — ACETAMINOPHEN 325 MG/1
650 TABLET ORAL EVERY 4 HOURS PRN
Qty: 100 TABLET | Refills: 0 | Status: SHIPPED | OUTPATIENT
Start: 2024-05-23

## 2024-05-23 RX ORDER — EPHEDRINE SULFATE 50 MG/ML
INJECTION, SOLUTION INTRAMUSCULAR; INTRAVENOUS; SUBCUTANEOUS PRN
Status: DISCONTINUED | OUTPATIENT
Start: 2024-05-23 | End: 2024-05-23

## 2024-05-23 RX ORDER — OXYCODONE HYDROCHLORIDE 5 MG/1
5-10 TABLET ORAL EVERY 4 HOURS PRN
Qty: 26 TABLET | Refills: 0 | Status: SHIPPED | OUTPATIENT
Start: 2024-05-23 | End: 2024-05-30

## 2024-05-23 RX ORDER — METHOCARBAMOL 500 MG/1
500 TABLET, FILM COATED ORAL EVERY 6 HOURS PRN
Status: DISCONTINUED | OUTPATIENT
Start: 2024-05-23 | End: 2024-05-24 | Stop reason: HOSPADM

## 2024-05-23 RX ORDER — LABETALOL HYDROCHLORIDE 5 MG/ML
10 INJECTION, SOLUTION INTRAVENOUS
Status: DISCONTINUED | OUTPATIENT
Start: 2024-05-23 | End: 2024-05-23 | Stop reason: HOSPADM

## 2024-05-23 RX ORDER — HYDROMORPHONE HYDROCHLORIDE 1 MG/ML
0.2 INJECTION, SOLUTION INTRAMUSCULAR; INTRAVENOUS; SUBCUTANEOUS
Status: DISCONTINUED | OUTPATIENT
Start: 2024-05-23 | End: 2024-05-24 | Stop reason: HOSPADM

## 2024-05-23 RX ORDER — CEFAZOLIN SODIUM/WATER 2 G/20 ML
2 SYRINGE (ML) INTRAVENOUS
Status: COMPLETED | OUTPATIENT
Start: 2024-05-23 | End: 2024-05-23

## 2024-05-23 RX ORDER — ACETAMINOPHEN 325 MG/1
975 TABLET ORAL EVERY 8 HOURS
Status: DISCONTINUED | OUTPATIENT
Start: 2024-05-23 | End: 2024-05-24 | Stop reason: HOSPADM

## 2024-05-23 RX ORDER — HYDROMORPHONE HYDROCHLORIDE 2 MG/1
2 TABLET ORAL EVERY 4 HOURS PRN
Status: DISCONTINUED | OUTPATIENT
Start: 2024-05-23 | End: 2024-05-24 | Stop reason: HOSPADM

## 2024-05-23 RX ORDER — AMOXICILLIN 250 MG
1-2 CAPSULE ORAL 2 TIMES DAILY PRN
Qty: 30 TABLET | Refills: 0 | Status: SHIPPED | OUTPATIENT
Start: 2024-05-23 | End: 2024-06-25

## 2024-05-23 RX ORDER — CEFAZOLIN SODIUM 2 G/100ML
2 INJECTION, SOLUTION INTRAVENOUS EVERY 8 HOURS
Qty: 40 ML | Refills: 0 | Status: COMPLETED | OUTPATIENT
Start: 2024-05-23 | End: 2024-05-24

## 2024-05-23 RX ORDER — ASPIRIN 81 MG/1
81 TABLET ORAL 2 TIMES DAILY
Status: DISCONTINUED | OUTPATIENT
Start: 2024-05-24 | End: 2024-05-24 | Stop reason: HOSPADM

## 2024-05-23 RX ORDER — ATORVASTATIN CALCIUM 10 MG/1
10 TABLET, FILM COATED ORAL DAILY
Status: DISCONTINUED | OUTPATIENT
Start: 2024-05-24 | End: 2024-05-24 | Stop reason: HOSPADM

## 2024-05-23 RX ORDER — LIDOCAINE 40 MG/G
CREAM TOPICAL
Status: DISCONTINUED | OUTPATIENT
Start: 2024-05-23 | End: 2024-05-24 | Stop reason: HOSPADM

## 2024-05-23 RX ADMIN — ACETAMINOPHEN 975 MG: 325 TABLET, FILM COATED ORAL at 06:19

## 2024-05-23 RX ADMIN — SODIUM CHLORIDE, POTASSIUM CHLORIDE, SODIUM LACTATE AND CALCIUM CHLORIDE: 600; 310; 30; 20 INJECTION, SOLUTION INTRAVENOUS at 08:40

## 2024-05-23 RX ADMIN — Medication 2 G: at 07:45

## 2024-05-23 RX ADMIN — CEFAZOLIN SODIUM 2 G: 2 INJECTION, SOLUTION INTRAVENOUS at 16:06

## 2024-05-23 RX ADMIN — TRANEXAMIC ACID 1950 MG: 650 TABLET ORAL at 06:19

## 2024-05-23 RX ADMIN — FENTANYL CITRATE 50 MCG: 50 INJECTION INTRAMUSCULAR; INTRAVENOUS at 07:33

## 2024-05-23 RX ADMIN — PROPOFOL 100 MCG/KG/MIN: 10 INJECTION, EMULSION INTRAVENOUS at 07:45

## 2024-05-23 RX ADMIN — ALBUMIN HUMAN: 0.05 INJECTION, SOLUTION INTRAVENOUS at 08:06

## 2024-05-23 RX ADMIN — HYDROMORPHONE HYDROCHLORIDE 4 MG: 2 TABLET ORAL at 21:04

## 2024-05-23 RX ADMIN — SENNOSIDES AND DOCUSATE SODIUM 1 TABLET: 8.6; 5 TABLET ORAL at 21:05

## 2024-05-23 RX ADMIN — BUPIVACAINE HYDROCHLORIDE IN DEXTROSE 1.8 ML: 7.5 INJECTION, SOLUTION SUBARACHNOID at 07:38

## 2024-05-23 RX ADMIN — EPHEDRINE SULFATE 5 MG: 5 INJECTION INTRAVENOUS at 08:10

## 2024-05-23 RX ADMIN — FENTANYL CITRATE 25 MCG: 50 INJECTION INTRAMUSCULAR; INTRAVENOUS at 10:08

## 2024-05-23 RX ADMIN — HYDROMORPHONE HYDROCHLORIDE 2 MG: 2 TABLET ORAL at 16:05

## 2024-05-23 RX ADMIN — ACETAMINOPHEN 975 MG: 325 TABLET, FILM COATED ORAL at 14:12

## 2024-05-23 RX ADMIN — SODIUM CHLORIDE, POTASSIUM CHLORIDE, SODIUM LACTATE AND CALCIUM CHLORIDE: 600; 310; 30; 20 INJECTION, SOLUTION INTRAVENOUS at 07:29

## 2024-05-23 RX ADMIN — ONDANSETRON 4 MG: 2 INJECTION INTRAMUSCULAR; INTRAVENOUS at 07:34

## 2024-05-23 RX ADMIN — EPHEDRINE SULFATE 5 MG: 5 INJECTION INTRAVENOUS at 08:13

## 2024-05-23 RX ADMIN — MIDAZOLAM 1 MG: 1 INJECTION INTRAMUSCULAR; INTRAVENOUS at 07:42

## 2024-05-23 RX ADMIN — LIDOCAINE HYDROCHLORIDE 100 MG: 20 INJECTION, SOLUTION INFILTRATION; PERINEURAL at 07:35

## 2024-05-23 RX ADMIN — MIDAZOLAM 1 MG: 1 INJECTION INTRAMUSCULAR; INTRAVENOUS at 07:33

## 2024-05-23 RX ADMIN — HYDROMORPHONE HYDROCHLORIDE 0.2 MG: 1 INJECTION, SOLUTION INTRAMUSCULAR; INTRAVENOUS; SUBCUTANEOUS at 17:33

## 2024-05-23 RX ADMIN — FENTANYL CITRATE 25 MCG: 50 INJECTION INTRAMUSCULAR; INTRAVENOUS at 07:48

## 2024-05-23 RX ADMIN — ACETAMINOPHEN 975 MG: 325 TABLET, FILM COATED ORAL at 21:05

## 2024-05-23 RX ADMIN — SODIUM CHLORIDE, POTASSIUM CHLORIDE, SODIUM LACTATE AND CALCIUM CHLORIDE: 600; 310; 30; 20 INJECTION, SOLUTION INTRAVENOUS at 21:16

## 2024-05-23 ASSESSMENT — ACTIVITIES OF DAILY LIVING (ADL)
ADLS_ACUITY_SCORE: 25
ADLS_ACUITY_SCORE: 25
ADLS_ACUITY_SCORE: 26
ADLS_ACUITY_SCORE: 25
ADLS_ACUITY_SCORE: 25
ADLS_ACUITY_SCORE: 26
ADLS_ACUITY_SCORE: 25
ADLS_ACUITY_SCORE: 26
ADLS_ACUITY_SCORE: 25
ADLS_ACUITY_SCORE: 26
ADLS_ACUITY_SCORE: 25
ADLS_ACUITY_SCORE: 26
ADLS_ACUITY_SCORE: 25
ADLS_ACUITY_SCORE: 26

## 2024-05-23 NOTE — ANESTHESIA PROCEDURE NOTES
"Intrathecal injection Procedure Note    Pre-Procedure   Staff -        Performed By: anesthesiologist       Location: OR       Procedure Start/Stop Times: 5/23/2024 7:38 AM and 5/23/2024 7:42 AM       Pre-Anesthestic Checklist: patient identified, IV checked, risks and benefits discussed, informed consent, monitors and equipment checked, pre-op evaluation, at physician/surgeon's request and post-op pain management  Timeout:       Correct Patient: Yes        Correct Procedure: Yes        Correct Site: Yes        Correct Position: Yes   Procedure Documentation  Procedure: intrathecal injection       Patient Position: sitting       Patient Prep/Sterile Barriers: sterile gloves, mask, patient draped       Skin prep: Betadine       Insertion Site: L3-4. (midline approach).       Needle Gauge: 25.        Needle Length (Inches): 3.5        Spinal Needle Type: Pencan       Introducer used       Introducer: 20 G       # of attempts: 1 and  # of redirects:  1    Assessment/Narrative         Paresthesias: No.       Sensory Level: T5    Medication(s) Administered   0.75% Hyperbaric Bupivacaine (Intrathecal) - Intrathecal   1.8 mL - 5/23/2024 7:38:00 AM  Medication Administration Time: 5/23/2024 7:38 AM     Comments:  Atraumatic Spinal x 1 attempt.  +CSF (clear) swirl during dosing.  No paresthesias.  Pt tolerated well.      FOR [facility] ([facility]) ONLY:   Pain Team Contact information: please page the Pain Team [address] [place]. Search \"Pain\". During daytime hours, please page the attending first. At night please page the resident first.      "

## 2024-05-23 NOTE — OR NURSING
"PACU to Inpatient Nursing Handoff    Patient Stephane Shaikh is a 69 year old male who speaks English.   Procedure Procedure(s):  Total Knee Arthroplasty Left, left knee lateral retinacular lengthening   Surgeon(s) Primary: Lorena Tariq MD  Assisting: Ana Kingsley PA-C  Resident - Assisting: Eladio Doyle MD; Vivi Dee MD     Allergies   Allergen Reactions    Liquid Adhesive Rash and Blisters     Blistering after TKA surgery in May 2023       Isolation  No active isolations     Past Medical History   has a past medical history of Acoustic neuroma (H) (06/14/2016), Benign essential hypertension, Heart murmur, Hyperlipidemia, Obesity, Class III, BMI 40-49.9 (morbid obesity) (H) (06/23/2014), Osteoarthritis, and Rheumatic fever.    Anesthesia Spinal   Dermatome Level  Currently at groin level- receding    Preop Meds acetaminophen (Tylenol) - time given: 0619  TXA 1950 mg - time given: 0619   Nerve block Not applicable   Intraop Meds fentanyl (Sublimaze): 100 mcg total  ondansetron (Zofran): last given at 0734  Versed 2 mg IV @ 0742   Local Meds Yes - Local Cocktail (ropivacaine, epinephrine, Toradol) @ 0958   Antibiotics cefazolin (Ancef) - last given at 0745     Pain Patient Currently in Pain: yes   PACU meds  Not applicable   PCA / epidural No   Capnography Machine ready   Telemetry ECG Rhythm: Sinus rhythm;First degree AV blockNone    Inpatient Telemetry Monitor Ordered? No        Labs Glucose Lab Results   Component Value Date    GLC 99 05/23/2024     07/22/2016       Hgb Lab Results   Component Value Date    HGB 14.6 05/06/2024    HGB 15.4 06/14/2016       INR No results found for: \"INR\"   PACU Imaging Completed     Wound/Incision Incision/Surgical Site 05/23/24 Anterior;Left Knee (Active)   Incision Assessment UTV 05/23/24 1050   Incision Drainage Amount UTV 05/23/24 1050   Drainage Description UTV 05/23/24 1050   Dressing Intervention Clean, dry, intact 05/23/24 1050   Number of " days: 0      CMS  Diminished sensation in lower extremities. S/p spinal, able to shift hips and bend knees.        Equipment continuous passive motion machine (CPM)   Other LDA       IV Access Peripheral IV Left;Dorsal Hand (Active)   Site Assessment WDL 05/23/24 1050   Line Status Infusing 05/23/24 1050   Dressing Transparent 05/23/24 1050   Dressing Status clean;dry;intact 05/23/24 1050   Dressing Intervention New dressing  05/23/24 0641   Phlebitis Scale 0-->no symptoms 05/23/24 1050   Infiltration? no 05/23/24 1050   Number of days:       Blood Products Albumin EBL 50 mL   Intake/Output Date 05/23/24 0700 - 05/24/24 0659   Shift 7484-2449 1111-0638 9383-7784 24 Hour Total   INTAKE   I.V. 1300   1300   Colloid 250   250   Shift Total(mL/kg) 1550(13.58)   1550(13.58)   OUTPUT   Blood 50   50   Shift Total(mL/kg) 50(0.44)   50(0.44)   Weight (kg) 114.1 114.1 114.1 114.1      Drains / [place]     Time of void PreOp Time of Void Prior to Procedure: 0730    PostOp  See bladder scan    Diapered? No   Bladder Scan  380 mL @ 1130   PO   water water     Vitals    B/P: 103/65  T: 97.2  F (36.2  C)    Temp src: Axillary  P:  Pulse: 58 (05/23/24 1145)          R: 20  O2:  SpO2: 97 %    O2 Device: None (Room air) (05/23/24 1050)              Family/support present Family to visit later- wife Steph updated on phone    Patient belongings  With patient   Patient transported on bed   DC meds/scripts (obs/outpt) Not applicable   Inpatient Pain Meds Released? Yes       Special needs/considerations None   Tasks needing completion None       Willow Vivas, RN  [place]

## 2024-05-23 NOTE — CONSULTS
"[facility]  Consult Note - Hospitalist Service, GOLD TEAM   Date of Admission:  5/23/2024  Consult Requested by: Ana Kingsley PA-C   Reason for Consult: Medical co-management    Assessment & Plan   Stephane Shaikh is a 69 year old male patient with a past medical history significant for obesity, HTN, HLD, rheumatic fever, and bilateral knee OA, RTKA 5/11/23, who was admitted to [facility] after undergoing LTKA with Dr. Tariq on 5/23/24.    S/p LTKA (5/23/24)  Pre-op hemoglobin 14.6 5/6/24. Anesthesia: Spinal with general. EBL 50cc. No apparent intra-op complications.   - Management per primary orthopedics team including pain control, activity, antibiotics, DVT prophylaxis, wound cares. Please refer to their documentation for complete details.   -Activity: Up with assist and assistive devices as needed until independent. Knee ROM as tolerated. Advance CPM as tolerated to goal of 0 to 90 degrees.  -Weight bearing status: WBAT    DVT prophylaxis: Aspirin 162mg daily x 4 weeks, starting morning of POD 1  -Elevation: Elevate heels off of bed on pillows, no pillows behind the knee at any time    -Wound Care: No dressing change until POD #7  -CPM 6-8 hours per day, 0-flexion tolerance, advance by 5 degrees    HLD   - Continue PTA Lipitor     HTN  Possible white coat HTN by patient report. Currently normotensive. Not on PTA meds. Sinus priyanka w/ 1st deg AVB on pre op EKG (unchanged from previous).    - Monitor    Clinically Significant Risk Factors Present on Admission                          # Obesity: Estimated body mass index is 35.08 kg/m  as calculated from the following:    Height as of this encounter: 1.803 m (5' 11\").    Weight as of this encounter: 114.1 kg (251 lb 8.7 oz).              Jeremiah Botello PA-C  Hospitalist Service, GOLD TEAM   Securely message with [employer] (more info)  Text page via [facility] Paging/Directory   See signed in provider for up to date " coverage information  ______________________________________________________________________    Chief Complaint   S/p LTKA    History is obtained from the patient and EMR    History of Present Illness   Stephane Shaikh is a 69 year old male patient with a past medical history significant for obesity, HTN, HLD, rheumatic fever, and bilateral knee OA, RTKA 5/11/23, who was admitted to [facility] after undergoing LTKA with Dr. Tariq on 5/23/24.    Patient seen in PACU, awake and alert, wearing CPM. Reporting spinal block still in effect and pain is 3/10. No acute complaints for me. No fevers. No chest pain/pressure or shortness of breath noted. No nausea. Moving all extremities. Has not urinated yet.    Past Medical History    Past Medical History:   Diagnosis Date    Acoustic neuroma (H) 06/14/2016    left     Benign essential hypertension     Heart murmur     Hyperlipidemia     Obesity, Class III, BMI 40-49.9 (morbid obesity) (H) 06/23/2014    Osteoarthritis     Rheumatic fever        Past Surgical History   Past Surgical History:   Procedure Laterality Date    ARTHROPLASTY KNEE Right 05/11/2023    Procedure: Total Knee Arthroplasty Right;  Surgeon: Lorena Tariq MD;  Location: [facility] OR    ARTHROSCOPY KNEE Left     approx 1990    ARTHROSCOPY KNEE RT/LT Right remote    ARTHROSCOPY SHOULDER RT/LT Right 01/01/2011    rotator cuff repair    INJECT STEROID (LOCATION) Left 05/11/2023    Procedure: Inject steroid knee;  Surgeon: Lorena Tariq MD;  Location: [facility] OR    TONSILLECTOMY      TOTAL KNEE ARTHROPLASTY Right     May 2023    VASECTOMY         Medications   Facility-Administered Medications Prior to Admission   Medication Dose Route Frequency Provider Last Rate Last Admin    lidocaine (PF) (XYLOCAINE) 1 % injection 9 mL  9 mL   Lorena Tariq MD   9 mL at 10/17/23 1315    triamcinolone (KENALOG-40) injection 40 mg  40 mg   Lorena Tariq MD   40 mg at 10/17/23 1315     Medications Prior to  Admission   Medication Sig Dispense Refill Last Dose    atorvastatin (LIPITOR) 10 MG tablet Take 1 tablet (10 mg) by mouth daily 90 tablet 0 5/16/2024    glucosamine 500 MG CAPS capsule Take 1,000 mg by mouth daily       Melatonin 10 MG TABS tablet Take 10 mg by mouth nightly as needed for sleep       multivitamin w/minerals (MULTI-VITAMIN) tablet Take 1 tablet by mouth daily       Omega-3 Fatty Acids (FISH OIL) 1200 MG capsule Take 1,200 mg by mouth daily       Wound Dressings (MEDIHONEY WOUND/BURN DRESSING) paste Apply topically daily Small pea sized amount to open wounds with daily dressing changes. 15 mL 1         Review of Systems    The 10 point Review of Systems is negative other than noted in the HPI or here.    Social History   I have reviewed this patient's social history and updated it with pertinent information if needed.  Social History     Tobacco Use    Smoking status: Never    Smokeless tobacco: Never   Substance Use Topics    Alcohol use: Yes     Alcohol/week: 0.0 - 1.0 standard drinks of alcohol    Drug use: No         Family History   I have reviewed this patient's family history and updated it with pertinent information if needed.  Family History   Problem Relation Age of Onset    Hypertension Mother     Cerebrovascular Disease Mother     Alzheimer Disease Mother     Depression Mother     Hearing Loss Mother     Malignant Hypertension Mother     Migraines Mother     Deep Vein Thrombosis (DVT) Mother     Alcoholism Father     Alcoholism Brother     Myocardial Infarction Maternal Grandfather     Cancer Maternal Uncle         stomach    Diabetes No family hx of     Thyroid Disease No family hx of     Glaucoma No family hx of     Macular Degeneration No family hx of          Allergies   Allergies   Allergen Reactions    Liquid Adhesive Rash and Blisters     Blistering after TKA surgery in May 2023        Physical Exam   Vital Signs: Temp: 97  F (36.1  C) Temp src: Axillary BP: 105/69 Pulse: 56    Resp: 22 SpO2: 99 % O2 Device: None (Room air)    Weight: 251 lbs 8.72 oz    GENERAL: Alert and oriented x 3. Well nourished, well developed.  No acute distress.    HEENT: Normocephalic, atraumatic. Anicteric sclera. Mucous membranes moist.   CV: RRR. S1, S2. No murmurs appreciated.   RESPIRATORY: Effort normal on room air. Lungs CTAB with no wheezing, rales, or rhonchi.   GI: Abdomen soft and non distended, non-tender, bowel sounds present x all 4 quadrants.  NEUROLOGICAL: Spinal block in effect and wearing off as expected, able to move bilateral lower extremities, still reporting some numbness. Moving bilateral upper extremities. No facial droop or slurred speech.  MUSCULOSKELETAL: Dressing CDI L knee, pedal pulses 2+, able to wiggle toes and plantar flex, toes warm, sensation diminished as expected with spinal block though is slowly returning.  EXTREMITIES: No gross deformities. No peripheral edema.   SKIN: Grossly warm, dry, and intact. No jaundice. No rashes.     Medical Decision Making       46 MINUTES SPENT BY ME on the date of service doing chart review, history, exam, documentation & further activities per the note.      Data   Imaging results reviewed over the past 24 hrs:   Recent Results (from the past 24 hour(s))   XR Knee Port Left 1/2 Views    Narrative    EXAM: XR KNEE PORT LEFT 1/2 VIEWS  LOCATION: [facility]  DATE: 5/23/2024    INDICATION: Post Op Total Knee  COMPARISON: 5/4/2024      Impression    IMPRESSION: Recent total knee arthroplasty. Alignment satisfactory. No acute fracture. Postoperative gas in the joint and soft tissues.     Recent Labs   Lab 05/23/24  0552   GLC 99

## 2024-05-23 NOTE — PLAN OF CARE
"Goal Outcome Evaluation: Progressing      Plan of Care Reviewed With: patient    Shift: 9974-8972    Reason for admission: S/p L total knee Arthroplasty    Pt arrived to unit at 1257, oriented to unit routine. VSS, Skin check per 2 RNs. Denies pain on arrival to unit.    VS: BP (!) 144/76 (BP Location: Right arm)   Pulse 66   Temp 98.4  F (36.9  C) (Oral)   Resp 16   Ht 1.803 m (5' 11\")   Wt 114.1 kg (251 lb 8.7 oz)   SpO2 98%   BMI 35.08 kg/m    Pain: Rates pain 7/10, scheduled tylenol, prn po dilaudid and IV Dilaudid administered.  Neuro: A&Ox4.  Cardiac: WDL  Respiratory: WDL  Diet/Appetite: Tolerating reg diet.  /GI: Adequate urine output. Last BM 5/22  LDA's: L PIV infusing LR 75 ml/hr.  Skin: Scabbed scratches L inner shin and R outer shin.  Activity: WBAT, 1 assist w/walker and gait belt. Pt up to B/R and has ambulated on hallway twice by RN with walker and PT with Crutches.     Plan: Do stairs with PT in AM then discharge home.                 "

## 2024-05-23 NOTE — ANESTHESIA CARE TRANSFER NOTE
Patient: Stephane Shaikh    Procedure: Procedure(s):  Total Knee Arthroplasty Left, left knee lateral retinacular lengthening       Diagnosis: Arthritis of left knee [M17.12]  Diagnosis Additional Information: No value filed.    Anesthesia Type:   Spinal     Note:    Oropharynx: oropharynx clear of all foreign objects and spontaneously breathing  Level of Consciousness: awake  Oxygen Supplementation: room air    Independent Airway: airway patency satisfactory and stable  Dentition: dentition unchanged  Vital Signs Stable: post-procedure vital signs reviewed and stable  Report to RN Given: handoff report given  Patient transferred to: PACU    Handoff Report: Identifed the Patient, Identified the Reponsible Provider, Reviewed the pertinent medical history, Discussed the surgical course, Reviewed Intra-OP anesthesia mangement and issues during anesthesia, Set expectations for post-procedure period and Allowed opportunity for questions and acknowledgement of understanding      Vitals:  Vitals Value Taken Time   BP 91/65 05/23/24 1053   Temp     Pulse 82 05/23/24 1054   Resp 19 05/23/24 1054   SpO2 92 % 05/23/24 1054   Vitals shown include unfiled device data.    Electronically Signed By: HEMANTH Gabriel CRNA  May 23, 2024  10:55 AM

## 2024-05-23 NOTE — ANESTHESIA POSTPROCEDURE EVALUATION
Patient: Stephane Shaikh    Procedure: Procedure(s):  Total Knee Arthroplasty Left, left knee lateral retinacular lengthening       Anesthesia Type:  Spinal    Note:  Disposition: Inpatient   Postop Pain Control: Uneventful            Sign Out: Well controlled pain   PONV: No   Neuro/Psych: Uneventful            Sign Out: Acceptable/Baseline neuro status   Airway/Respiratory: Uneventful            Sign Out: Acceptable/Baseline resp. status   CV/Hemodynamics: Uneventful            Sign Out: Acceptable CV status; No obvious hypovolemia; No obvious fluid overload   Other NRE:    DID A NON-ROUTINE EVENT OCCUR? No    Event details/Postop Comments:  Seen at the bedside.  Spinal resolved.  Fully awake and alert.  Comfortable in NAD.  VSS.  Discharge to the floor.       Last vitals:  Vitals Value Taken Time   /69 05/23/24 1215   Temp 36.1  C (97  F) 05/23/24 1200   Pulse 54 05/23/24 1225   Resp 41 05/23/24 1225   SpO2 100 % 05/23/24 1232   Vitals shown include unfiled device data.    Electronically Signed By: Lesley Noble MD  May 23, 2024  12:34 PM

## 2024-05-23 NOTE — ANESTHESIA PREPROCEDURE EVALUATION
Anesthesia Pre-Procedure Evaluation    Patient: Stephane Shaikh   MRN: [id number] : 1955        Procedure : Procedure(s):  Total Knee Arthroplasty Left (Left:  Knee)          Past Medical History:   Diagnosis Date     Acoustic neuroma (H) 2016    left      Benign essential hypertension      Heart murmur      Hyperlipidemia      Obesity, Class III, BMI 40-49.9 (morbid obesity) (H) 2014     Osteoarthritis      Rheumatic fever       Past Surgical History:   Procedure Laterality Date     ARTHROPLASTY KNEE Right 2023    Procedure: Total Knee Arthroplasty Right;  Surgeon: Lorena Tariq MD;  Location: [facility]     ARTHROSCOPY KNEE Left     1990     ARTHROSCOPY KNEE RT/LT Right remote     ARTHROSCOPY SHOULDER RT/LT Right 2011    rotator cuff repair     INJECT STEROID (LOCATION) Left 2023    Procedure: Inject steroid knee;  Surgeon: Lorena Tariq MD;  Location: [facility]     TONSILLECTOMY       TOTAL KNEE ARTHROPLASTY Right     May 2023     VASECTOMY        Allergies   Allergen Reactions     Liquid Adhesive Rash and Blisters     Blistering after TKA surgery in May 2023      Social History     Tobacco Use     Smoking status: Never     Smokeless tobacco: Never   Substance Use Topics     Alcohol use: Yes     Alcohol/week: 0.0 - 1.0 standard drinks of alcohol      Wt Readings from Last 1 Encounters:   24 114.1 kg (251 lb 8.7 oz)        Anesthesia Evaluation   Pt has had prior anesthetic.         ROS/MED HX  ENT/Pulmonary:     (+)     FÁTIMA risk factors,  hypertension, obese,                                Neurologic:  - neg neurologic ROS     Cardiovascular: Comment: Patient doesn't have a heart murmer    (+) Dyslipidemia hypertension-range: 138/83/ -   -  - -                                      METS/Exercise Tolerance:     Hematologic:  - neg hematologic  ROS     Musculoskeletal:   (+)  arthritis,             GI/Hepatic:  - neg GI/hepatic ROS     Renal/Genitourinary:  - neg  "Renal ROS     Endo:  - neg endo ROS   (+)               Obesity,       Psychiatric/Substance Use:  - neg psychiatric ROS     Infectious Disease:  - neg infectious disease ROS     Malignancy:  - neg malignancy ROS     Other:  - neg other ROS          Physical Exam    Airway        Mallampati: I   TM distance: > 3 FB   Neck ROM: full   Mouth opening: > 3 cm    Respiratory Devices and Support         Dental         B=Bridge, C=Chipped, L=Loose, M=Missing    Cardiovascular   cardiovascular exam normal       Rhythm and rate: regular and normal     Pulmonary   pulmonary exam normal              OUTSIDE LABS:  CBC:   Lab Results   Component Value Date    WBC 7.1 05/06/2024    WBC 7.0 05/02/2023    HGB 14.6 05/06/2024    HGB 12.4 (L) 05/12/2023    HCT 43.4 05/06/2024    HCT 43.3 05/02/2023     05/06/2024     05/02/2023     BMP:   Lab Results   Component Value Date     05/06/2024     05/02/2023    POTASSIUM 4.5 05/06/2024    POTASSIUM 4.0 05/02/2023    CHLORIDE 103 05/06/2024    CHLORIDE 103 05/02/2023    CO2 21 (L) 05/06/2024    CO2 28 05/02/2023    BUN 28.5 (H) 05/06/2024    BUN 12.3 05/02/2023    CR 1.05 05/06/2024    CR 1.06 05/02/2023    GLC 99 05/23/2024    GLC 85 05/06/2024     COAGS: No results found for: \"PTT\", \"INR\", \"FIBR\"  POC: No results found for: \"BGM\", \"HCG\", \"HCGS\"  HEPATIC:   Lab Results   Component Value Date    ALBUMIN 4.6 05/06/2024    PROTTOTAL 8.2 05/06/2024    ALT 31 05/06/2024    AST 43 05/06/2024    ALKPHOS 83 05/06/2024    BILITOTAL 0.8 05/06/2024     OTHER:   Lab Results   Component Value Date    A1C 5.3 06/27/2023    FARIDEH 9.4 05/06/2024    TSH 2.56 05/02/2023       Anesthesia Plan    ASA Status:  2       Anesthesia Type: Spinal.      Maintenance: TIVA.        Consents    Anesthesia Plan(s) and associated risks, benefits, and realistic alternatives discussed. Questions answered and patient/representative(s) expressed understanding.     - Discussed: Risks, Benefits and " Alternatives for BOTH SEDATION and the PROCEDURE were discussed     - Discussed with:       - Extended Intubation/Ventilatory Support Discussed: No.      - Patient is DNR/DNI Status: No     Use of blood products discussed: No .     Postoperative Care    Pain management: IV analgesics, Oral pain medications.   PONV prophylaxis: Ondansetron (or other 5HT-3), Dexamethasone or Solumedrol     Comments:                Lesley Noble MD

## 2024-05-23 NOTE — BRIEF OP NOTE
Orthopaedic Surgery Brief Op-Note      Patient: Stephane Shaikh  : 1955  Date of Service: 2024 11:02 AM    Pre-operative Diagnosis: Arthritis of left knee [M17.12]  Post-operative Diagnosis: same    Procedure(s) Performed: Procedure(s):  Total Knee Arthroplasty Left, left knee lateral retinacular lengthening    Surgeon: Surgeons and Role:     * Lorena Tariq MD - Primary     * Ana Kingsley PA-C - Assisting     * Vivi Dee MD - Resident - Assisting     * Eladio Doyle MD - Resident - Assisting    Anesthesia: spinal  EBL: 50 cc  UOP: see anesthesia record  Tourniquet Time: 120 minutes at 250 mmHg    Implants:   Implant Name Type Inv. Item Serial No.  Lot No. LRB No. Used Action   BONE CEMENT STRK SIMPLEX P SPEEDSET 6192-1-001 - [id number] Cement, Bone BONE CEMENT STRK SIMPLEX P SPEEDSET 6192-1-001  [employer] [id number] Left 1 Implanted   BONE CEMENT STRK SIMPLEX P SPEEDSET 6192-1-001 - [id number] Cement, Bone BONE CEMENT STRK SIMPLEX P SPEEDSET 6192-1-001  [employer] [id number] Left 1 Implanted   IMP BASEPLATE TIBIAL HOWM TRI 7 5520-B-700 - [id number] Total Joint Component/Insert IMP BASEPLATE TIBIAL HOWM TRI 7 5520-B-700  [employer] [id number] Left 1 Implanted   CMPNT PTLR 10MM 35MM ASYM TRTHLN X3 KN STRL - [id number] Total Joint Component/Insert CMPNT PTLR 10MM 35MM ASYM TRTHLN X3 KN STRL  [employer] [id number] Left 1 Implanted   IMP PEG DISTAL FEMORAL STRK 5575-X-000 - [id number] Total Joint Component/Insert IMP PEG DISTAL FEMORAL STRK 5575-X-000  [employer] [id number] Left 1 Implanted   IMP COMP FEM STRK TRIATHLN PS LT 6 5515-F-601 - [id number] Total Joint Component/Insert IMP COMP FEM STRK TRIATHLN PS LT 6 5515-F-601  [employer] [id number] Left 1 Implanted   IMP INSERT TIBIAL STRK TRI 7 09MM 8066-D-455-E - [id number] Total Joint Component/Insert IMP INSERT TIBIAL STRK TRI 7 09MM 1956-N-853-E  [employer] [id number] Left 1 Implanted      Drains: hemovac  Intra-op Labs/Cxs/Specimens: None  Complications: No apparent complications during procedure  Findings: Please see dictated operative note for details    Disposition: Stable to PACU, then admit to Orthopaedics.    Post-Op Plan:  Assessment/Plan: Stephane Shaikh is a 69 year old male s/p Procedure(s):  Total Knee Arthroplasty Left, left knee lateral retinacular lengthening on 5/23/2024 with Dr. Tariq.    Activity: Up with assist and assistive devices as needed until independent. Knee ROM as tolerated. Advance CPM as tolerated to goal of 0 to 90 degrees.  Weight bearing status: WBAT    Antibiotics: Cefazolin x 24 hours  Diet: Begin with clear fluids and progress diet as tolerated. Bowel regimen. Anti-emetics PRN.    DVT prophylaxis: Aspirin 162mg daily x 4 weeks, starting morning of POD 1  Elevation: Elevate heels off of bed on pillows, no pillows behind the knee at any time    Wound Care: No dressing change until POD #7  CPM 6-8 hours per day, 0-flexion tolerance, advance by 5 degrees  Pain management: Orals PRN, IV for breakthrough only  X-rays: AP/Lat operative knee XR in PACU  Physical Therapy: Mobilization, ROM, ADL's  Occupational Therapy: ADL's  Labs: Trend Hgb on PODs #1 & 2  Cultures: None  Consults: PT, OT. Hospitalist, appreciate assistance in caring for this patient throughout the perioperative period    Future Appointments   Date Time Provider Department Center   5/23/2024  4:00 PM Jason Pantoja PT [facility] [place]   5/24/2024  8:45 AM Jason Pantoja PT [facility] [place]   5/24/2024 10:15 AM Lucia Giraldo OT [facility] [place]   5/24/2024  1:00 PM Jason Pantoja PT [facility] [place]   6/7/2024  1:00 PM Ana Kingsley PA-C [facility]   6/26/2024  9:30 AM Sixto Tran DO [facility] [place]   7/30/2024 10:40 AM Lorena Tarqi MD [facility]       Disposition: Pending progress with therapies, pain control on orals, and medical stability, anticipate discharge to Home vs TCU  on POD #1-2.    I assisted with positioning, prepping and draping, and closure.      Ana Kingsley PA-C 5/23/2024 11:02 AM  Orthopaedic Surgery     Please page me with any questions/concerns during regular weekday hours before 5pm. If there is no response, if it is a weekend, or if it is during evening hours then please page the orthopaedic surgery resident on call.

## 2024-05-23 NOTE — OP NOTE
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      OPERATIVE FINDINGS: Grade 4 MFC, grade 4MTP, grade 2-3  LFC, grade 2 LTP, grade 2-3 patella, grade2 trochlea     DESCRIPTION OF PROCEDURE:  Under Spinal Anesthesia, the patient was positioned supine on the operating room table.   The patient's leg was prepped and draped in usual sterile fashion.  A pause was performed   identifying the correct leg and administration of antibiotics.      A tourniquet, which was on the upper aspect of the patient's leg was elevated   to 250 mmHg after Esmarch exsanguination of the leg.  A midline incision   was used to enter the knee.  A vastus splitting incision was used to enter the joint.    Findings as above.      The procedure began with soft tissue balancing by a generous release of the  medial side including the distal  MCL.  This was necessary to balance the knee in neutral.    We used an intramedullary femoral guide set at 6 degrees of anatomic valgus and making a distal cut of 10 mm.  We then made  the appropriate anterior and posterior cuts to fit a #6 femur.      We then turned our attention to the tibia.  We used an extramedullary guide set at neutral  and made a minimal cut on the bone on the medial tibial side.  We used the tensioning device to evaluate balance between the collaterals and between flexion and extension and felt that we  were tight in  flexion.  We therefore sacrificed the PCL.  We re-evaluated balance using the tensioning device. We felt we had good balance between the collaterals and between flexion and extension.    We made the appropriate box and chamfer cuts on the femur to fit it for a PS femur.     We then returned to the tibia and fit the tibia to a #7 tibia base plate.      We then turned attention to the patella.  Total thickness was 26 mm.  We   removed bone to a 15 mm thickness and put an asymmetric patella button  for resulting thickness of 26 mm.  We did a minimal jsamyne-retinacular release.  With all components in place, we did a trial  range of motion and felt we had good balance, good tracking of the patella through a passive range of motion.      We then removed the trial prosthesis, brought the permanent prosthesis in the room, pulse lavaged the knee with antibiotic irrigation.    Cemented all components at once in the following order; tibia, femur, patella.  Excess cement was removed both during and after the hardening process.    For the bulk of the hardening process, the leg was held in full extension with a trial insert in place.  Once we verified that the trial insert was the   correct size, we put the permanent prosthesis in place.      We then pulse lavaged the knee again with antibiotic irrigation.  A drain was placed deep to the wound.  The subvastus and superficial vastus fascia was  closed with a running #1 Vicryl, 2-0 Ethibond sutures followed by #1 Vicryl, closed the extensor mechanism proper.  Subcutaneous tissue was closed with 2-0 Vicryl.  The skin was closed with a running Monocryl.  Steri-Strips, Betadine, Adaptic, sterile dressing and a Tubigrip stockinette was put in place.      Tourniquet was let down after closure of the extensor mechanism.  Total tourniquet time was 120 minutes.    The patient tolerated the procedure well and was taken to the recovery room in satisfactory condition.     The patient will be  maintained weightbearing as tolerated.  The patient will use a Active Ice and a CPM postoperatively.     At three distinct times during the case the knee was injected with a pre-mixed cocktail of epimorphine, toradol, and ropivicaine.  This was done in the posterior knee before cementing, in the retinacular tissues while the cement was hardening, and in the subcutaneious space prior to closure.    Ana BURTON was a necessary component of this case for tissue retraction and limb positioning.  There was a resident learner available for the case, but a second set of hands was necessary  to  help manage the limb in the OR, help with tissue retraction to ensure maximum exposure and maximum surgical efficiency, both which promotes best practice and best surgical outcomes.  A PA was an essential part of this case.         MARION WOO MD          5/23/2024     Stephane MANJARREZ Hawa   MRN# [id number]                             YOB: 1955

## 2024-05-23 NOTE — PHARMACY-ADMISSION MEDICATION HISTORY
Pharmacist Admission Medication History    Admission medication history is complete. The information provided in this note is only as accurate as the sources available at the time of the update.    Information Source(s): Patient and CareEverywhere/SureScripts via in-person    Pertinent Information: Patient last took his medications 1 week ago    Changes made to PTA medication list:  Added: None  Deleted: medi honey dressing (pt reports no longer using)  Changed: None    Allergies reviewed with patient and updates made in EHR: yes    Medication History Completed By: Eryn Chavira [facility] 5/23/2024 2:59 PM    PTA Med List   Medication Sig Last Dose    acetaminophen (TYLENOL) 325 MG tablet Take 2 tablets (650 mg) by mouth every 4 hours as needed for other (mild pain)     [START ON 5/24/2024] aspirin 81 MG EC tablet Take 1 tablet (81 mg) by mouth 2 times daily for 28 days     atorvastatin (LIPITOR) 10 MG tablet Take 1 tablet (10 mg) by mouth daily 5/16/2024    glucosamine 500 MG CAPS capsule Take 1,000 mg by mouth daily 5/16/2024    Melatonin 10 MG TABS tablet Take 10 mg by mouth nightly as needed for sleep 5/15/2024    multivitamin w/minerals (MULTI-VITAMIN) tablet Take 1 tablet by mouth daily 5/14/2024    Omega-3 Fatty Acids (FISH OIL) 1200 MG capsule Take 1,200 mg by mouth daily 5/14/2024    oxyCODONE (ROXICODONE) 5 MG tablet Take 1-2 tablets (5-10 mg) by mouth every 4 hours as needed for moderate to severe pain     senna-docusate (SENOKOT-S/PERICOLACE) 8.6-50 MG tablet Take 1-2 tablets by mouth 2 times daily as needed for constipation Take while on oral narcotics to prevent or treat constipation.

## 2024-05-23 NOTE — PROGRESS NOTES
05/23/24 1609   Appointment Info   Signing Clinician's Name / Credentials (PT) Jason Cooling, PT, DPT   Rehab Comments (PT) WBAT LLE, ROM as tolerated L knee   Quick Adds   Quick Adds Certification   Living Environment   People in Home spouse   Current Living Arrangements other (see comments)  (living in 5th wheel at this time)   Home Accessibility stairs to enter home   Number of Stairs, Main Entrance 3   Stair Railings, Main Entrance railing on right side (ascending)  (grab bar)   Number of Stairs, Within Home, Primary two   Stair Railings, Within Home, Primary railings on both sides of stairs   Transportation Anticipated family or friend will provide   Living Environment Comments Patient will be staying in 5th wheel camper with spouse, 3 steps to enter with unilateral grab bar. Has 2 steps to bedroom with rails. Has walk-in shower with shower bench.   Self-Care   Usual Activity Tolerance good   Current Activity Tolerance moderate   Equipment Currently Used at Home none   Fall history within last six months no   Activity/Exercise/Self-Care Comment Patient IND with mobility and cares at baseline, does not use assistive device.   General Information   Onset of Illness/Injury or Date of Surgery 05/23/24   Referring Physician Ana Kingsley PA-C   Patient/Family Therapy Goals Statement (PT) Patient would like to go home tomorrow.   Pertinent History of Current Problem (include personal factors and/or comorbidities that impact the POC) 69 year old male s/p L TKA POD #0   Existing Precautions/Restrictions fall;weight bearing   Weight-Bearing Status - LLE weight-bearing as tolerated   Cognition   Affect/Mental Status (Cognition) WFL   Orientation Status (Cognition) oriented x 4   Follows Commands (Cognition) WFL   Pain Assessment   Patient Currently in Pain Yes, see Vital Sign flowsheet   Range of Motion (ROM)   ROM Comment post-surgical ROM limitations L knee   Strength (Manual Muscle Testing)   Strength Comments  post-surgical strength limitations LLE   Bed Mobility   Comment, (Bed Mobility) supine<>sit transfers with SBA   Transfers   Comment, (Transfers) sit<>stands with CGA and bilateral axillary crutches   Gait/Stairs (Locomotion)   Comment, (Gait/Stairs) ambulation with CGA and bilateral axillary crutches   Balance   Balance no deficits were identified   Sensory Examination   Sensory Perception patient reports no sensory changes   Clinical Impression   Criteria for Skilled Therapeutic Intervention Yes, treatment indicated   PT Diagnosis (PT) impaired mobility   Influenced by the following impairments post-surgical weakness and ROM limitations LLE, reduced activity tolerance, increased pain levels   Functional limitations due to impairments impaired functional mobility, impaired gait, transfers, bed mobility, stair navigation   Clinical Presentation (PT Evaluation Complexity) stable   Clinical Presentation Rationale clinical judgment   Clinical Decision Making (Complexity) low complexity   Planned Therapy Interventions (PT) balance training;bed mobility training;gait training;neuromuscular re-education;patient/family education;stair training;strengthening;transfer training;progressive activity/exercise   Risk & Benefits of therapy have been explained evaluation/treatment results reviewed;care plan/treatment goals reviewed;risks/benefits reviewed;current/potential barriers reviewed;participants voiced agreement with care plan;patient;participants included;spouse/significant other;daughter   PT Total Evaluation Time   PT Eval, Low Complexity Minutes (36671) 6   Therapy Certification   Start of care date 05/23/24   Certification date from 05/23/24   Certification date to 05/26/24   Medical Diagnosis Left Total Knee Arthroplasty   Physical Therapy Goals   PT Frequency 2x/day   PT Predicted Duration/Target Date for Goal Attainment 05/26/24   PT Goals Bed Mobility;Transfers;Gait;Stairs   PT: Bed Mobility Modified  independent;Supine to/from sit   PT: Transfers Modified independent;Sit to/from stand;Assistive device   PT: Gait Modified independent;Greater than 200 feet;Crutches   PT: Stairs Supervision/stand-by assist;3 stairs;Rail on right   Interventions   Interventions Quick Adds Gait Training;Therapeutic Activity;Therapeutic Procedure   Therapeutic Procedure/Exercise   Ther. Procedure: strength, endurance, ROM, flexibillity Minutes (91005) 3   Symptoms Noted During/After Treatment increased pain   Treatment Detail/Skilled Intervention Provided patient with TKA handout. Completed LE exercises x5e including: ankle pumps, quad sets, hamstring sets, heel slides, SAQ. Verbal and tactile cues utilized to ensure proper form and technique.   Therapeutic Activity   Therapeutic Activities: dynamic activities to improve functional performance Minutes (68621) 14   Symptoms Noted During/After Treatment Fatigue;Increased pain   Treatment Detail/Skilled Intervention Patient supine in bed at beginning of session, agreeable to participate in PT. Spouse and daughter present. Educated on WBAT status to LLE, ROM as tolerated to L knee. Educated on promoting knee extension by placing pillow under heel when supine, no pillow under knee. Discussed post-surgical pain and pain management via regular icing, pain meds, and elevation. Time during session for line management and room set-up. Performing supine<>sit transfers with SBA and HOB elevated. Sit<>stands with bilateral axillary crutches and initial CGA, progressing to SBA. Cues for sequencing and hand placement. Patient reported that he will need to use crutches at discharge due to 5th wheel camper being too small for walker. Patient returning to supine at end of session with call light next to him and all needs within reach. Pillow under L heel and ice placed at L knee.   Gait Training   Gait Training Minutes (80485) 9   Symptoms Noted During/After Treatment (Gait Training) fatigue;increased  pain   Treatment Detail/Skilled Intervention Patient completed bout of ambulation for 110' with CGA and bilateral axillary crutches. Progressing to SBA. Cues for sequencing. Discussed knee flexion during swing phase and knee extension at heel strike. Utilizing crutches well as patient reported having TKA on R last year and utilized crutches for mobility following.   Distance in Feet 110'   PT Discharge Planning   PT Plan progress gait distance, initiate stair training, review and progress LE exercises, repeated sit<>stands   PT Discharge Recommendation (DC Rec)   (defer to ortho)   PT Rationale for DC Rec Patient below baseline functional mobility but moving well during session with SBA and bilateral axillary crutches. Lives with spouse and will be staying in 5th wheel camper, 3 steps to enter with grab bar. Anticipate discharge home with assist from spouse for IADL's as needed. Recommend use of assistive device for all mobility   PT Brief overview of current status SBA for mobility with crutches   Total Session Time   Timed Code Treatment Minutes 26   Total Session Time (sum of timed and untimed services) 32     [facility]  OUTPATIENT PHYSICAL THERAPY EVALUATION  PLAN OF TREATMENT FOR OUTPATIENT REHABILITATION  (COMPLETE FOR INITIAL CLAIMS ONLY)  Patient's Last Name, First Name, M.I.  YOB: 1955  Stephane Shaikh                        Provider's Name  [facility] Medical Record No.  [id number]                             Onset Date:  05/23/24   Start of Care Date:  05/23/24   Type:     _X_PT   ___OT   ___SLP Medical Diagnosis:  Left Total Knee Arthroplasty              PT Diagnosis:  impaired mobility Visits from SOC:  1     See note for plan of treatment, functional goals and certification details    I CERTIFY THE NEED FOR THESE SERVICES FURNISHED UNDER        THIS PLAN OF TREATMENT AND WHILE UNDER MY CARE     (Physician co-signature of  this document indicates review and certification of the therapy plan).

## 2024-05-23 NOTE — DISCHARGE INSTRUCTIONS
TOTAL KNEE ARTHROPLASTY POST OPERATIVE DISCHARGE INSTRUCTIONS    FOLLOW UP APPOINTMENT  You are scheduled for a post operative wound check with Dr. Tariq's clinic approximately two weeks after surgery. At approximately eight weeks after surgery, you will see Dr. Tariq in clinic. During this visit, repeat X rays of your operative knee will be performed.      Your follow up appointments will be at the location that you regularly see Dr. Tariq:    [facility]  [address] [place] [address]  [phone]      ACTIVITY  Weight bearing status:   You may bear weight on your operative extremity as tolerated, using assistive devices (walker, cane) as needed. As you begin to feel more comfortable ambulating, you may gradually transition from a walker to a cane. Eventually, you should wean from all assistive devices. Although we would like you to discontinue use of assistive devices as soon as possible, do not transition until you have worked with your physical therapist to achieve safe balance and comfort.     Continuous passive motion machine:   Perform CPM exercises for six to eight hours per day for the first four weeks after surgery. The CPM should be set at 0 degrees to flexion tolerance with a goal of 90 degrees. Advance the CPM settings aggressively in increments of 5 degrees every 30 minutes until goal is achieved.     Exercises:   Perform the following exercises at least three times per day for the first four weeks after surgery to prevent complications, such as blood clots in your legs:  1) Point and flex your feet  2) Move your ankle around in big circles  3) Wiggle your toes   Also, perform thigh muscle tightening exercises for 10 to 15 minutes at least three times per day for the first four weeks after surgery.    Athletic Activities:  Activities such as swimming, bicycling, jogging, running, and stop-and-go sports should be avoided until permitted  by your provider.    Driving:  Driving is not permitted until directed by your provider. Typically, driving is restricted for three to four weeks after right knee surgery and three weeks after left knee surgery. Under no circumstance are you permitted to drive while using narcotic pain medications.    Return to Work:  You may return to work when directed by your provider. Typically, patients with desk/sitting jobs can return to work within two weeks while patients with heavy labor jobs can return to work around three months after surgery.      COMFORT AND PAIN MANAGEMENT  Elevation:   During times of inactivity throughout the first two weeks after surgery, make an effort to decrease swelling by elevating your operative extremity. This is most effectively done by lying down and placing several pillows lengthwise under your thigh and calf to raise your toes above the level of your nose. To ensure that you knee remains in full extension, do not place pillows directly under your knee.     Icing:  An ice pack will be provided to control swelling and discomfort after surgery. Place a thin towel on your skin and apply the ice pack overtop. You may apply ice for 20 minutes as often as two times per hour.    Pain Medications:  You will be discharged with acetaminophen (Tylenol) and a narcotic medication for pain management after surgery. Acetaminophen is most effective when it is taken per the schedule outlined by your provider (every four, six, or eight hours as prescribed). You may safely use acetaminophen as prescribed for the first four weeks after surgery provided you do not exceed the maximum daily dose prescribed by your provider (usually 3000 mg - 4000 mg). The narcotic pain medication should only be taken on an as-needed basis when necessary and should be reserved for severe pain that is not controlled with scheduled acetaminophen. In the first three days following surgery, your symptoms may warrant use of the  narcotic pain medication every three, four, or six hours as prescribed. After three days, focus your efforts on decreasing (tapering) use of narcotic medications.   The most successful tapering strategy is to first, decrease the dose (number of tablets) and second, increase the interval (time in between doses). For example, if you begin taking two tablets every four hours after surgery, start your taper by decreasing one of these doses to one tablet. Every one to two days, decrease another dose to one tablet until you are eventually taking one tablet every four hours. Once this is achieved, focus on increasing the number of hours between doses, moving from one tablet every four hours to one tablet every six hours. As tolerated, continue to increase the interval to eight and twelve hours. Eventually, taper to one dose every evening and discontinue when no longer needed.      ANTICOAGULATION  Take aspirin 162mg daily as prescribed for a total of four weeks after surgery.       WOUND CARE AND SHOWERING  Wound care:  Remove compression dressing (tubigrip sleeve) twice daily for 10 minutes (to prevent skin breakdown).   Remove the compressive device for showering (see showering instructions below).   Discontinue Compression dressing two days after surgery, but can be worn as needed during the day for swelling management  A sterile dressing was placed over your surgical incision. This dressing should be kept in place for the first ten days after surgery.   After ten days, remove the dressing and leave the incision open to air, covering only for showering (see showering instructions below).   Your surgical incision was closed with steri strips  DO NOT pick or scratch your incision.   Please contact Dr. Tariq's office if you notice the following:   Significant cloudy, bloody, or malodorous drainage from the incision  Excessive warmth and redness around the incision.    Showering:  You may shower beginning two days after  surgery, however, the surgical incision must remain dry until your first office visit.   Always remove the ACE wrap or tubigrip compression sleeve for showering.   During the first ten days after surgery, your surgical dressing will prevent the incision from becoming wet.   Once the surgical dressing is removed, cover the incision with saran wrap (or any other non-permeable covering) to keep the incision dry while showering.  You are strictly prohibited from soaking or submerging the surgical wound underwater.   Tub Bathing:  Tub bathing, swimming, or any other activities that cause your incision to be submerged should be avoided until allowed by your provider. Typically, patients are allowed to return to these activities six weeks after surgery.      CONTACTING YOUR PHYSICIAN:  You may experience symptoms that require follow-up before your scheduled two week appointment. Please contact Dr. Tariq's office if you experience:  1) Pain in your knee that persists or worsens in the first few days after surgery  2) Excessive redness or drainage of cloudy or bloody material from the wounds (Clear red tinted fluid and some mild drainage should be expected) or drainage of any kind five days after surgery  3) A temperature elevation greater than 101.5    4) Pain, swelling or redness in your calf  5) Numbness or weakness in your leg or foot      Regular business hours (Monday - Friday, 8am - 5pm):  [facility]: [phone]    After hours and weekends:  [facility] on call Orthopedic resident: [phone]

## 2024-05-24 ENCOUNTER — APPOINTMENT (OUTPATIENT)
Dept: PHYSICAL THERAPY | Facility: CLINIC | Age: 69
End: 2024-05-24
Attending: ORTHOPAEDIC SURGERY
Payer: MEDICARE

## 2024-05-24 VITALS
HEART RATE: 69 BPM | BODY MASS INDEX: 35.22 KG/M2 | SYSTOLIC BLOOD PRESSURE: 147 MMHG | WEIGHT: 251.54 LBS | HEIGHT: 71 IN | RESPIRATION RATE: 18 BRPM | OXYGEN SATURATION: 98 % | TEMPERATURE: 98.5 F | DIASTOLIC BLOOD PRESSURE: 73 MMHG

## 2024-05-24 LAB
GLUCOSE BLDC GLUCOMTR-MCNC: 119 MG/DL (ref 70–99)
HGB BLD-MCNC: 12.1 G/DL (ref 13.3–17.7)
HOLD SPECIMEN: NORMAL

## 2024-05-24 PROCEDURE — 99214 OFFICE O/P EST MOD 30 MIN: CPT | Performed by: INTERNAL MEDICINE

## 2024-05-24 PROCEDURE — 250N000011 HC RX IP 250 OP 636: Performed by: PHYSICIAN ASSISTANT

## 2024-05-24 PROCEDURE — 36415 COLL VENOUS BLD VENIPUNCTURE: CPT | Performed by: PHYSICIAN ASSISTANT

## 2024-05-24 PROCEDURE — 97116 GAIT TRAINING THERAPY: CPT | Mod: GP,GZ

## 2024-05-24 PROCEDURE — 82962 GLUCOSE BLOOD TEST: CPT

## 2024-05-24 PROCEDURE — 85018 HEMOGLOBIN: CPT | Performed by: PHYSICIAN ASSISTANT

## 2024-05-24 PROCEDURE — 97530 THERAPEUTIC ACTIVITIES: CPT | Mod: GP

## 2024-05-24 PROCEDURE — 250N000013 HC RX MED GY IP 250 OP 250 PS 637: Performed by: PHYSICIAN ASSISTANT

## 2024-05-24 RX ORDER — METHOCARBAMOL 500 MG/1
500 TABLET, FILM COATED ORAL EVERY 6 HOURS PRN
Qty: 30 TABLET | Refills: 0 | Status: SHIPPED | OUTPATIENT
Start: 2024-05-24

## 2024-05-24 RX ADMIN — HYDROMORPHONE HYDROCHLORIDE 4 MG: 2 TABLET ORAL at 06:54

## 2024-05-24 RX ADMIN — HYDROMORPHONE HYDROCHLORIDE 4 MG: 2 TABLET ORAL at 02:45

## 2024-05-24 RX ADMIN — ASPIRIN 81 MG: 81 TABLET, COATED ORAL at 07:52

## 2024-05-24 RX ADMIN — HYDROMORPHONE HYDROCHLORIDE 4 MG: 2 TABLET ORAL at 10:57

## 2024-05-24 RX ADMIN — CEFAZOLIN SODIUM 2 G: 2 INJECTION, SOLUTION INTRAVENOUS at 00:19

## 2024-05-24 RX ADMIN — SENNOSIDES AND DOCUSATE SODIUM 1 TABLET: 8.6; 5 TABLET ORAL at 07:52

## 2024-05-24 RX ADMIN — POLYETHYLENE GLYCOL 3350 17 G: 17 POWDER, FOR SOLUTION ORAL at 07:53

## 2024-05-24 RX ADMIN — ACETAMINOPHEN 975 MG: 325 TABLET, FILM COATED ORAL at 06:11

## 2024-05-24 RX ADMIN — METHOCARBAMOL 500 MG: 500 TABLET ORAL at 07:52

## 2024-05-24 ASSESSMENT — ACTIVITIES OF DAILY LIVING (ADL)
ADLS_ACUITY_SCORE: 26

## 2024-05-24 NOTE — PROGRESS NOTES
"[facility]    Medicine Progress Note - Hospitalist Service, GOLD TEAM 16    Date of Admission:  5/23/2024    Assessment & Plan   Stephane Shaikh is a 69 year old male patient with a past medical history significant for obesity, HTN, HLD, rheumatic fever, and bilateral knee OA, RTKA 5/11/23, who was admitted to [facility] after undergoing LTKA with Dr. Tariq on 5/23/24.     # S/p LTKA (5/23/24)  Pre-op hemoglobin 14.6 5/6/24. Anesthesia: Spinal with general. EBL 50cc. No apparent intra-op complications.  Postop Hgb 12.1   - Management per primary orthopedics team including pain control, activity, antibiotics, DVT prophylaxis, wound cares. Please refer to their documentation for complete details.   -Activity: Up with assist and assistive devices as needed until independent. Knee ROM as tolerated. Advance CPM as tolerated to goal of 0 to 90 degrees.  -Weight bearing status: WBAT    DVT prophylaxis: Aspirin 162mg daily x 4 weeks, starting morning of POD 1  -Elevation: Elevate heels off of bed on pillows, no pillows behind the knee at any time    -Wound Care: No dressing change until POD #7  -CPM 6-8 hours per day, 0-flexion tolerance, advance by 5 degrees     HLD   - Continue PTA Lipitor     HTN  Not on PTA meds. Sinus priyanka w/ 1st deg AVB on pre op EKG (unchanged from previous).    - Recommend outpatient follow-up with PCP.          Diet: Advance Diet as Tolerated: Regular Diet Adult  Discharge Instruction - Regular Diet Adult    DVT Prophylaxis: Aspirin 81 mg twice daily  [place] Catheter: Not present  Lines: None     Cardiac Monitoring: None  Code Status: Full Code      Clinically Significant Risk Factors Present on Admission                       # Obesity: Estimated body mass index is 35.08 kg/m  as calculated from the following:    Height as of this encounter: 1.803 m (5' 11\").    Weight as of this encounter: 114.1 kg (251 lb 8.7 oz).              Disposition Plan "     Medically Ready for Discharge: Anticipated Today             TAMMY FRANK MD  Hospitalist Service, GOLD TEAM 16  [facility]  Securely message with scroll kit (more info)  Text page via [employer] Paging/Directory   See signed in provider for up to date coverage information  ______________________________________________________________________    Interval History   -Afebrile and hemodynamically stable  - Denies any acute complaints  - Does have some difficulty with pain control but largely manageable.  He has already worked with therapy today.    Physical Exam   Vital Signs: Temp: 98.5  F (36.9  C) Temp src: Oral BP: (!) 147/73 Pulse: 69   Resp: 18 SpO2: 98 % O2 Device: None (Room air)    Weight: 251 lbs 8.72 oz    General Appearance: Lying  comfortably in bed, on room air, in no acute distress of discomfort  HEENT: PERRL: EOMI; moist mucous membrane w/o lesions  Neck: No JVD  Pulmonary: Clear to auscultation bilaterally, no wheezes or crackles  CVS: Regular rhythm, no murmurs, rubs or gallops  GI: BS (+), soft nontender, no rebound or guarding   Extremities: No LE edema; left knee in dressing.  Skin: No rashes or lesions  Neurologic: A&O x3      Medical Decision Making       35 MINUTES SPENT BY ME on the date of service doing chart review, history, exam, documentation & further activities per the note.      Data

## 2024-05-24 NOTE — PLAN OF CARE
"Goal Outcome Evaluation:      Plan of Care Reviewed With: patient. Overall Patient Progress: improving.       VS: BP (!) 155/82 (BP Location: Right arm)   Pulse 74   Temp 98.4  F (36.9  C) (Oral)   Resp 22   Ht 1.803 m (5' 11\")   Wt 114.1 kg (251 lb 8.7 oz)   SpO2 94%   BMI 35.08 kg/m       O2: Sats >90% on RA    Output: Voiding without difficulty in the bathroom.   Last BM: LBM 5/22.    Activity: Up with A X1 with crutches.   Skin: Intact except  L knee incision.   Pain: Pt rating pain 5/10; given prn PO dilaudid.   Neuro: A&O X4. Denies N/T.    Dressing: L knee incisional dressing CDI. Ice applied.   Diet: Tolerating regular diet.   LDA: PIV infusing LR at 75 ml/hr into left hand.    Equipment: Patent belongings, crutches and IV pole.    Plan: TBD. Will continue to monitor.    Additional Info: CPM is at 30 degrees.        "

## 2024-05-24 NOTE — PROGRESS NOTES
"Orthopaedic Surgery Progress Note 05/24/2024    S: OR yesterday. No acute events overnight.  Pain well controlled on current pain regimen. Tolerating  diet.  Ambulating, working with PT. Denies numbness or tingling. Questions answered. POC reviewed.    O:  Temp: 98.2  F (36.8  C) Temp src: Oral BP: (!) 149/79 Pulse: 72   Resp: 22 SpO2: 99 % O2 Device: None (Room air)      Exam:  Gen: No acute distress, resting comfortably in bed.  Resp: Non-labored breathing  MSK:    LLE:  - Dressings c/d/i with a few small areas of strikethrough  - SILT femoral/tibial/sural/saphenous/DP/SP nerves  - Fires Quad, TA, EHL, FHL, GaSC  - PT/DP pulses 2+, foot wwp      No lab results found in last 7 days.    Invalid input(s): \"SEDRATE\"      Assessment/Plan: Stephane Shaikh is a 69 year old male s/p Total Knee Arthroplasty Left, left knee lateral retinacular lengthening on 5/23/2024 with Dr. Tariq. Doing well.     Activity: Up with assist and assistive devices as needed until independent. Knee ROM as tolerated. Advance CPM as tolerated to goal of 0 to 90 degrees.  Weight bearing status: WBAT    Antibiotics: Cefazolin x 24 hours  Diet: Begin with clear fluids and progress diet as tolerated. Bowel regimen. Anti-emetics PRN.    DVT prophylaxis: Aspirin 162mg daily x 4 weeks, starting morning of POD 1  Elevation: Elevate heels off of bed on pillows, no pillows behind the knee at any time    Wound Care: No dressing change until POD #7  CPM 6-8 hours per day, 0-flexion tolerance, advance by 5 degrees  Pain management: Orals PRN, IV for breakthrough only  X-rays: AP/Lat operative knee XR in PACU  Physical Therapy: Mobilization, ROM, ADL's  Occupational Therapy: ADL's  Labs: Trend Hgb on PODs #1 & 2  Cultures: None  Consults: PT, OT. Hospitalist, appreciate assistance in caring for this patient throughout the perioperative period            Future Appointments   Date Time Provider Department Center   5/23/2024  4:00 PM Jason Pantoja PT UR[facility] " [place]   5/24/2024  8:45 AM Jason Pantoja, PT [facility] [place]   5/24/2024 10:15 AM Lucia Giraldo, OT [facility] [place]   5/24/2024  1:00 PM Jason Pantoja, PT [facility] [place]   6/7/2024  1:00 PM [facility]   6/26/2024  9:30 AM Sixto Tran,  [facility] [place]   7/30/2024 10:40 AM L[facility]na Tariq MD [facility]         Disposition: Pending progress with therapies, pain control on orals, and medical stability, anticipate discharge to Home today       Eladio Doyle MD  Orthopaedic Surgery, PGY-4  Pager: [phone]

## 2024-05-24 NOTE — PLAN OF CARE
Pt A&O x's 4. VSS.BP slightly elevated, denies headache or any other symptoms. Afebrile. 02 sats in the 90s on RA.  Lungs clear. Denies SOB, CP or nausea. Tolerating regular diet. Bowel sound active in all quadrants. No BM  during the shift but passing gas. Voiding adequate amount into the toilet. Pt up with assist of one, crutches, and  gate belt. Pt calls appropriately.  Pain well managed with  current pain regimen. CMS intact, denies N/T. Left knee dressing clean, dry and intact. CPM of at midnight. PIV patent and SL. Pt slept between care and is able to make needs known, call light with in reach. Will continue to monitor.

## 2024-05-24 NOTE — PLAN OF CARE
"Goal Outcome Evaluation:      Plan of Care Reviewed With: patient    Overall Patient Progress: improvingOverall Patient Progress: improving       VS: BP (!) 147/73   Pulse 69   Temp 98.5  F (36.9  C)   Resp 18   Ht 1.803 m (5' 11\")   Wt 114.1 kg (251 lb 8.7 oz)   SpO2 98%   BMI 35.08 kg/m       O2: SpO2 > 90% on RA. Denies SOB/chest pain.    Output: Voids adequately and spontaneously into toilet    Last BM: 5/22   Activity: Asstx1 with GB and crutches. CPM reapplied - see flowsheets.    Skin: Left knee surgical incision    Pain: Managed with PRN robaxin, oral dilaudid, ice    CMS: A&Ox4. Denies N/T.    Dressing: Scant drainage spots.    Diet: Regular    LDA: removed   Equipment: IV pole, crutches, personal belongings    Plan: Discharge pending progress with therapies    Additional Info:      DISCHARGE SUMMARY    Pt discharging to: Home  Transportation: Spouse   AVS given and discussed: Pt was given AVS and pt states understanding of content. Pt has no further questions.   Stoplight Tool given and discussed: Yes, no further questions.  Medications given: Yes, discussed. No further questions.   Belongings returned: Yes, ensured all belongings packed and sent with pt. No items in security.   Comments: Escorted to [facility] via wheelchair ride by transport. Pt discharged at 1200.             "

## 2024-05-24 NOTE — PLAN OF CARE
Physical Therapy Discharge Summary    Reason for therapy discharge:    All goals and outcomes met, no further needs identified.    Progress towards therapy goal(s). See goals on Care Plan in Psychiatric electronic health record for goal details.  Goals met    Therapy recommendation(s):    Continue home exercise program.

## 2024-05-29 ENCOUNTER — OFFICE VISIT (OUTPATIENT)
Dept: FAMILY MEDICINE | Facility: OTHER | Age: 69
End: 2024-05-29
Attending: FAMILY MEDICINE
Payer: COMMERCIAL

## 2024-05-29 VITALS
HEART RATE: 89 BPM | BODY MASS INDEX: 35.4 KG/M2 | RESPIRATION RATE: 18 BRPM | DIASTOLIC BLOOD PRESSURE: 67 MMHG | TEMPERATURE: 96.9 F | HEIGHT: 71 IN | OXYGEN SATURATION: 97 % | SYSTOLIC BLOOD PRESSURE: 102 MMHG | WEIGHT: 252.9 LBS

## 2024-05-29 DIAGNOSIS — I10 BENIGN ESSENTIAL HYPERTENSION: Primary | ICD-10-CM

## 2024-05-29 PROCEDURE — G0463 HOSPITAL OUTPT CLINIC VISIT: HCPCS

## 2024-05-29 PROCEDURE — 99213 OFFICE O/P EST LOW 20 MIN: CPT | Performed by: FAMILY MEDICINE

## 2024-05-29 NOTE — DISCHARGE SUMMARY
"ORTHOPAEDIC SURGERY DISCHARGE SUMMARY     Date of Admission: 5/23/2024  Date of Discharge: 5/24/2024 12:00 PM  Disposition: Home  Staff Physician: No att. providers found  Primary Care Provider: Sixto Tran    DISCHARGE DIAGNOSIS:  Arthritis of left knee [M17.12]    PROCEDURES: Procedure(s):  Total Knee Arthroplasty Left, left knee lateral retinacular lengthening  on 5/23/2024    BRIEF HISTORY:  Per op report/recent clinic note:  \"Patient is a 69 y/o male here for a F/U for his Right total knee arthroplasty DOS 5/11/23.  He did have some issues with wound healing, probable reaction to Exofin, but this healed up eventually.     He loves his new knee and has not complaints.  He is interested in scheduling his LEFT knee total knee arthroplasty next May.     He is leaving for [place] for the winter this weekend.  He would like an injection into his left knee today.     He is trying hard to lose weight.  By our scales today he has not lost any weight but by his scales in his gym and he has lost 20 pounds.  He continues to have a BMI of 36 based on our scales.     In regards to his function he feels like his right knee is doing very well.  His total limitations with his left knee.  He continues to limp at the end of the day and feels that his exercise stamina is limited now by his left knee\"    HOSPITAL COURSE:    The patient was admitted following the above listed procedures for pain control and rehabilitation. Stephane Shaikh did well post-operatively. Medicine was consulted post operatively to aid in management of medical co-morbidities. The patient received routine nursing cares and at the time of discharge was medically stable. Vital signs were stable throughout admission. The patient is tolerating a regular diet and is voiding spontaneously. All PT/OT goals have been met for safe mobility. Pain is now controlled on oral medications which will be available on discharge. Stool softeners have been used while taking " pain medications to help prevent constipation. Stephane Shaikh is deemed medically safe to discharge.     Antibiotics:  Periop  DVT prophylaxis:  See below  PT Progress:  Has met PT/OT goals for safe mobility.  Pain Meds:  Weaned off all IV pain meds by discharge.  Inpatient Events: No significant events or complications.     PHYSICAL EXAM:    See progress note day of discharge    FOLLOWUP:    Future Appointments   Date Time Provider Department Center   6/7/2024  1:00 PM Ana Kingsley PA-C [facility]   6/25/2024 10:20 AM Lorena Tariq MD [facility]       Orthopaedic Surgery appointments are at the [facility] ([address] [place] [address]). Call [phone] to schedule a follow-up appointment at this location with your provider.     PLANNED DISCHARGE ORDERS:      Discharge Medication List as of 5/24/2024 10:43 AM        START taking these medications    Details   acetaminophen (TYLENOL) 325 MG tablet Take 2 tablets (650 mg) by mouth every 4 hours as needed for other (mild pain), Disp-100 tablet, R-0, E-Prescribe      aspirin 81 MG EC tablet Take 1 tablet (81 mg) by mouth 2 times daily for 28 days, Disp-56 tablet, R-0, E-Prescribe      methocarbamol (ROBAXIN) 500 MG tablet Take 1 tablet (500 mg) by mouth every 6 hours as needed for muscle spasms, Disp-30 tablet, R-0, E-Prescribe      oxyCODONE (ROXICODONE) 5 MG tablet Take 1-2 tablets (5-10 mg) by mouth every 4 hours as needed for moderate to severe pain, Disp-26 tablet, R-0, E-Prescribe      senna-docusate (SENOKOT-S/PERICOLACE) 8.6-50 MG tablet Take 1-2 tablets by mouth 2 times daily as needed for constipation Take while on oral narcotics to prevent or treat constipation., Disp-30 tablet, R-0, E-PrescribeWhile taking narcotics           CONTINUE these medications which have NOT CHANGED    Details   atorvastatin (LIPITOR) 10 MG tablet Take 1 tablet (10 mg) by mouth daily, Disp-90 tablet, R-0, E-Prescribe     "  glucosamine 500 MG CAPS capsule Take 1,000 mg by mouth daily, Historical      Melatonin 10 MG TABS tablet Take 10 mg by mouth nightly as needed for sleep, Historical      multivitamin w/minerals (MULTI-VITAMIN) tablet Take 1 tablet by mouth daily, Historical      Omega-3 Fatty Acids (FISH OIL) 1200 MG capsule Take 1,200 mg by mouth daily, Historical               Discharge Procedure Orders   Physical Therapy  Referral   Standing Status: Future   Referral Priority: Routine Referral Type: Rehab Therapy Physical Therapy   Number of Visits Requested: 1     Reason for your hospital stay   Order Comments: Left total knee arthroplasty     When to call - Contact Surgeon Team   Order Comments: You may experience symptoms that require follow-up before your scheduled appointment. Refer to the \"Stoplight Tool\" for instructions on when to contact your Surgeon Team if you are concerned about pain control, blood clots, constipation, or if you are unable to urinate.     When to call - Reach out to Urgent Care   Order Comments: If you are not able to reach your Surgeon Team and you need immediate care, go to the Orthopedic Walk-in Clinic or Urgent Care at your Surgeon's office.  Do NOT go to the Emergency Room unless you have shortness of breath, chest pain, or other signs of a medical emergency.     When to call - Reasons to Call 911   Order Comments: Call 911 immediately if you experience sudden-onset chest pain, arm weakness/numbness, slurred speech, or shortness of breath     Discharge Instruction - Breathing exercises   Order Comments: Perform breathing exercises using your Incentive Spirometer 10 times per hour while awake for 2 weeks.     Symptoms - Fever Management   Order Comments: A low grade fever can be expected after surgery.  Use acetaminophen (TYLENOL) as needed for fever management.  Contact your Surgeon Team if you have a fever greater than 101.5 F, chills, and/or night sweats.     Symptoms - Constipation " management   Order Comments: Constipation (hard, dry bowel movements) is expected after surgery due to the combination of being less active, the anesthetic, and the opioid pain medication.  You can do the following to help reduce constipation:  ~  FLUIDS:  Drink clear liquids (water or Gatorade), or juice (apple/prune).  ~  DIET:  Eat a fiber rich diet.    ~  ACTIVITY:  Get up and move around several times a day.  Increase your activity as you are able.  MEDICATIONS:  Reduce the risk of constipation by starting medications before you are constipated.  You can take Miralax   (1 packet as directed) and/or a stool softener (Senokot 1-2 tablets 1-2 times a day).  If you already have constipation and these medications are not working, you can get magnesium citrate and use as directed.  If you continue to have constipation you can try an over the counter suppository or enema.  Call your Surgeon Team if it has been greater than 3 days since your last bowel movement.     Symptoms - Reduced Urine Output   Order Comments: Changes in the amount of fluids you drank before and after surgery may result in problems urinating.  It is important to stay well-hydrated after surgery and drink plenty of water. If it has been greater than 8 hours since you have urinated despite drinking plenty of water, call your Surgeon Team.     Activity - Exercises to prevent blood clots   Order Comments: Unless otherwise directed by your Surgeon team, perform the following exercises at least three times per day for the first four weeks after surgery to prevent blood clots in your legs: 1) Point and flex your feet (Ankle Pumps), 2) Move your ankle around in big circles, 3) Wiggle your toes, 4) Walk, even for short distances, several times a day, will help decrease the risk of blood clots.     Order Specific Question Answer Comments   Is discharge order? Yes      Comfort and Pain Management - Pain after Surgery   Order Comments: Pain after surgery is  "normal and expected.  You will have some amount of pain for several weeks after surgery.  Your pain will improve with time.  There are several things you can do to help reduce your pain including: rest, ice, elevation, and using pain medications as needed. Contact your Surgeon Team if you have pain that persists or worsens after surgery despite rest, ice, elevation, and taking your medication(s) as prescribed. Contact your Surgeon Team if you have new numbness, tingling, or weakness in your operative extremity.     Comfort and Pain Management - Swelling after Surgery   Order Comments: Swelling and/or bruising of the surgical extremity is common and may persist for several months after surgery. In addition to frequent icing and elevation, gentle compressive support with an ACE wrap or tubigrip may help with swelling. Apply compression regularly, removing at least twice daily to perform skin checks. Contact your Surgeon Team if your swelling increases and is NOT associated with an increase in your activity level, or if your swelling increases and is associated with redness and pain.     Comfort and Pain Management - LOWER Extremity Elevation   Order Comments: Swelling is expected for several months after surgery. This type of swelling is usually associated with gravity and activity, and can be improved with elevation.   The best way to do this is to get your \"toes above your nose\" by laying down and placing several pillows lengthwise under your calf and heel. When elevating your leg keep your knee completely straight. Perform this elevation as often as possible especially for the first two weeks after surgery.     Comfort and Pain Management - Cold therapy   Order Comments: Ice can be used to control swelling and discomfort after surgery. Place a thin towel over your operative site and apply the ice pack overtop. Leave ice pack in place for 20 minutes, then remove for 20 minutes. Repeat this 20 minutes on/20 minutes " off routine as often as tolerated.     Medication Instructions - Acetaminophen (TYLENOL) Instructions   Order Comments: You were discharged with acetaminophen (TYLENOL) for pain management after surgery. Acetaminophen most effectively manages pain symptoms when it is taken on a schedule without missing doses (every four, six, or eight hours). Your Provider will prescribe a safe daily dose between 3000 - 4000 mg.  Do NOT exceed this daily dose. Most patients use acetaminophen for pain control for the first four weeks after surgery.  You can wean from this medication as your pain decreases.     Medication Instructions - NSAID Instructions   Order Comments: You were discharged with an anti-inflammatory medication for pain management to use in combination with acetaminophen (TYLENOL) and the narcotic pain medication.  Take this medication exactly as directed.  You should only take one anti-inflammatory at a time.  Some common anti-inflammatories include: ibuprofen (ADVIL, MOTRIN), naproxen (ALEVE, NAPROSYN), celecoxib (CELEBREX), meloxicam (MOBIC), ketorolac (TORADOL).  Take this medication with food and water.     Medication Instructions - Opioids - Tapering Instructions   Order Comments: In the first three days following surgery, your symptoms may warrant use of the narcotic pain medication every four to six hours as prescribed. This is normal. As your pain symptoms improve, focus your efforts on decreasing (tapering) use of narcotic medications. The most successful tapering strategy is to first, decrease the number of tablets you take every 4-6 hours to the minimum prescribed. Then, increase the amount of time between doses.  For example:  First, taper to   or 1 tablet every 4-6 hours.  Then, taper to   or 1 tablet every 6-8 hours.  Then, taper to   or 1 tablet every 8-10 hours.  Then, taper to   or 1 tablet every 10-12 hours.  Then, taper to   or 1 tablet at bedtime.  The bedtime dose can help with comfort during  sleep and is typically the last dose to be discontinued after surgery.     Follow Up Care   Order Comments: Follow-up with your Surgeon Team in 2 weeks for wound check.     Activity - Total Knee Arthroplasty     Order Specific Question Answer Comments   Is discharge order? Yes      Return to Driving   Order Comments: Return to driving - Driving is NOT permitted until directed by your provider. Under no circumstance are you permitted to drive while using narcotic pain medications.     Order Specific Question Answer Comments   Is discharge order? Yes      Return to athletic activities   Order Comments: Return to athletic activities- Activities such as swimming, bicycling, jogging, running, and stop-and-go sports should be avoided until permitted by your Provider.     Order Specific Question Answer Comments   Is discharge order? Yes      Return to School / Work   Order Comments: Return to School / Work: You may return to work/school when directed by your Provider.     Order Specific Question Answer Comments   Is discharge order? Yes      Weight bearing as tolerated   Order Comments: Weight bearing as tolerated on your operative extremity.     Order Specific Question Answer Comments   Is discharge order? Yes      Dressing / Wound Care - Wound   Order Comments: Leave surgical dressing in place until clinic evaluation in 2 weeks for incision check. Dressing is waterproof but can be reinforced as needed with tegaderm. Contact your Surgeon Team if water gets underneath dressing, you have increased redness, warmth around the surgical wound, and/or drainage from the surgical wound.     Dressing Wound Care - Shower with wound/dressing NOT covered   Order Comments: You do not need to cover your dressing or incision in the shower, you may allow water and soap to run over top of the surgical dressing or incision. Okay to reinforce bandage with saran wrap as needed for showering. You may shower 2 days after surgery.  You are  strictly prohibited from soaking or submerging the surgical wound underwater.     Dressing / Wound Care - NO Tub Bathing   Order Comments: Tub bathing, swimming, or any other activities that will cause your incision to be submerged in water should be avoided until allowed by your Surgeon.     Medication instructions -  Anticoagulation - aspirin   Order Comments: Take the aspirin as prescribed for a total of four weeks after surgery.  This is given to help minimize your risk of blood clot.     Medication Instructions - Opioid Instructions (Greater than or equal to 65 years)   Order Comments: You were discharged with an opioid medication (hydromorphone, oxycodone, hydrocodone, or tramadol). This medication should only be taken for breakthrough pain that is not controlled with acetaminophen (TYLENOL). If you rate your pain less than 3 you do not need this medication.  Pain rating 0-3:  You do not need this medication  Pain rating 4-6:  Take 1/2 tablet every 4-6 hours as needed  Pain rating 7-10:  Take 1 tablet every 4-6 hours as needed  Do not exceed 4 tablets per day     NO Precautions   Order Comments: No precautions directed by your Provider.  You may perform range of motion activities as tolerated.     Order Specific Question Answer Comments   Is discharge order? Yes      Crutches DME   Order Comments: DME Documentation: Describe the reason for need to support medical necessity: Impaired gait status post knee surgery. I, the undersigned, certify that the above prescribed supplies are medically necessary for this patient and is both reasonable and necessary in reference to accepted standards of medical practice in the treatment of this patient's condition and is not prescribed as a convenience.     Order Specific Question Answer Comments   Medical Equipment (DME) Supplier: [email or web address]    PATIENT INSTRUCTIONS: If you did not receive this ordered item today, please contact [email or web address]  [employer] for availability ([place] Locations: [phone], [place]: [phone]).    Crutch Type: Standard    Crutches Add On: NA    Length of Need: Lifetime      Cane DME   Order Comments: DME Documentation: Describe the reason for need to support medical necessity: Impaired gait status post knee surgery. I, the undersigned, certify that the above prescribed supplies are medically necessary for this patient and is both reasonable and necessary in reference to accepted standards of medical practice in the treatment of this patient's condition and is not prescribed as a convenience.     Order Specific Question Answer Comments   Medical Equipment (DME) Supplier: [employer]    PATIENT INSTRUCTIONS: If you did not receive this ordered item today, please contact [employer] for availability ([facility] Locations: [phone], [place]: [phone]).    Cane Type: Single Tip    Reminder: Patient can typically get 1 every 5 years      Walker DME   Order Comments: DME Documentation: Describe the reason for need to support medical necessity: Impaired gait status post knee surgery. I, the undersigned, certify that the above prescribed supplies are medically necessary for this patient and is both reasonable and necessary in reference to accepted standards of medical practice in the treatment of this patient's condition and is not prescribed as a convenience.     Order Specific Question Answer Comments   Medical Equipment (DME) Supplier: [employer]    PATIENT INSTRUCTIONS: If you did not receive this ordered item today, please contact [employer] for availability ([facility] Locations: [phone], [place]: [phone]).    Walker Type: Standard (2 Wheel)    Accessories: N/A      Discharge Instruction - Regular Diet Adult   Order Comments: Return to your pre-surgery diet unless instructed otherwise     Order Specific Question Answer Comments   Is  discharge order? Yes      Assign Questionnaire Series to Patient       Eladio Doyle MD  Orthopaedic Surgery, PGY-4

## 2024-05-29 NOTE — PROGRESS NOTES
Assessment & Plan     Benign essential hypertension  Blood pressure today actually on the low end 102/67, asymptomatic.  Not currently on any antihypertensives.  He is currently on some pain medications and muscle relaxants which could potentially lower his blood pressure.  From a pain standpoint he states he actually hurts more now postop than he did before surgery.    At this point we are going to hold off on starting any blood pressure medications as he is running low today including on recheck.  Continue home monitoring, can follow-up once he is fully recovered from his knee surgery if he is running high consistently at home.  Bring in home blood pressure cuff to ensure accuracy against our readings..        Subjective   Sam is a 69 year old, presenting for the following health issues:  Hypertension        5/29/2024    10:14 AM   Additional Questions   Roomed by Josie Crystal   Accompanied by none         5/29/2024    10:14 AM   Patient Reported Additional Medications   Patient reports taking the following new medications none       69-year-old male here for a blood pressure follow-up visit.  His blood pressure was elevated at his preop visit about 3 weeks ago and looking through his chart multiple times before that.  He is not on any antihypertensives.  Plan was for him to have his surgery and then see back afterwards to address his blood pressure long-term.  He had his left total knee arthroplasty done on May 23 and was discharged on May 24.    He reports he has been doing some home blood pressure monitoring which is showing 144-148/78 range.        History of Present Illness       Hypertension: He presents for follow up of hypertension.  He does check blood pressure  regularly outside of the clinic. Outside blood pressures have been over 140/90. He follows a low salt diet.     He eats 2-3 servings of fruits and vegetables daily.He consumes 0 sweetened beverage(s) daily.He exercises with enough effort to  "increase his heart rate 30 to 60 minutes per day.  He exercises with enough effort to increase his heart rate 5 days per week.   He is taking medications regularly.             Objective    /67 (BP Location: Left arm, Patient Position: Sitting, Cuff Size: Adult Large)   Pulse 89   Temp 96.9  F (36.1  C) (Tympanic)   Resp 18   Ht 1.803 m (5' 11\")   Wt 114.7 kg (252 lb 14.4 oz)   SpO2 97%   BMI 35.27 kg/m    Body mass index is 35.27 kg/m .  Physical Exam  Constitutional:       General: He is not in acute distress.     Appearance: Normal appearance.   Cardiovascular:      Rate and Rhythm: Normal rate and regular rhythm.   Pulmonary:      Effort: Pulmonary effort is normal.   Neurological:      Mental Status: He is alert and oriented to person, place, and time.                    Signed Electronically by: MENDEZ DE LEON DO    "

## 2024-05-30 DIAGNOSIS — Z96.652 S/P TOTAL KNEE ARTHROPLASTY, LEFT: ICD-10-CM

## 2024-05-30 RX ORDER — OXYCODONE HYDROCHLORIDE 5 MG/1
5-10 TABLET ORAL EVERY 8 HOURS PRN
Qty: 26 TABLET | Refills: 0 | Status: SHIPPED | OUTPATIENT
Start: 2024-05-30 | End: 2024-06-25

## 2024-05-30 NOTE — TELEPHONE ENCOUNTER
- A call was placed to the patient. Patient did not answer phone so a voicemail was left.     - Call back number to clinic was given and patient was told to call back and ask for Dr. Marciano Wick's nurse.

## 2024-05-30 NOTE — TELEPHONE ENCOUNTER
"The following questions were asked about the patient's pain:    1) How often are you taking the oxycodone? How many tablets each time?  Taking 1 tablets twice daily  *refill not needed at this time     2) Can you rate your pain (0-10) before and after administration?  Before: 8  After: 3     3) How would you describe the pain (e.g., dull, sharp, achy, tingly, cramping, etc.)?  \"pain\"    4) Are you taking any other pain medications?  tylenol every 4 hours as needed moderate-severe pain  **refill not needed at this time   robaxin every 6 hours as needed moderate-severe pain  **refill not needed at this time     5) Are you adhering to the elevating 21 hours a day and icing for 20 minutes every hour?  Yes  He is using the ACE wrap     - We discussed the importance of tapering the use of the narcotic medications. I said the most successful tapering strategy is to first, decrease the number of tablets you take to the minimum prescribed. Then, increase the amount of time between doses. I explained the bedtime dose can help with comfort during sleep and is typically the last dose to be discontinued after surgery. Patient agreed to work on tapering as able.      - Pharmacy was verified. I let the patient know the request for opoid medications go onto our PA so they may not be filled immediately and someone may be reaching out with further questions.     - Call back number to clinic was given and patient had no further questions at this time.     "

## 2024-06-05 ASSESSMENT — KOOS JR
KOOS JR SCORING: 52.47
BENDING TO THE FLOOR TO PICK UP OBJECT: MODERATE
GOING UP OR DOWN STAIRS: MODERATE
TWISING OR PIVOTING ON KNEE: MODERATE
STANDING UPRIGHT: MODERATE
RISING FROM SITTING: MODERATE
HOW SEVERE IS YOUR KNEE STIFFNESS AFTER FIRST WAKING IN MORNING: MODERATE
STRAIGHTENING KNEE FULLY: MODERATE

## 2024-06-07 ENCOUNTER — VIRTUAL VISIT (OUTPATIENT)
Dept: ORTHOPEDICS | Facility: CLINIC | Age: 69
End: 2024-06-07
Payer: COMMERCIAL

## 2024-06-07 DIAGNOSIS — Z98.890 S/P KNEE SURGERY: Primary | ICD-10-CM

## 2024-06-07 PROCEDURE — 99024 POSTOP FOLLOW-UP VISIT: CPT | Mod: 95 | Performed by: PHYSICIAN ASSISTANT

## 2024-06-07 NOTE — PROGRESS NOTES
"The patient was informed of the following:    \"This virtual visit will be conducted via a call between you and your physician/provider. We have found that certain health care needs can be provided without the need for an extensive physical exam.  This service lets us provide the care you need with a short phone conversation.  If a prescription is necessary we can send it directly to your pharmacy.  If lab work is needed we can place an order for that and you can then stop by our lab to have the test done at a later time.     If during the course of the call the physician/provider feels a virtual visit is not appropriate, you will not be charged for this service.\"     _________________________________      ASSESSMENT/PLAN:  Stephane Shaikh is a 69 year old who is status post Left TKA with Dr. Tariq on 5/23/24.  Healing well.    Incision without signs of blistering or irritation as was the case following his right knee.  He was instructed on trimming Monocryl suture tails at the level of the skin after alcohol swab.  He may shower and pat dry and let Steri-Strips begin to fall off.  If they are still in in 10 days, he was directed to encourage them off.  He was counseled to notify clinic urgently with any signs of drainage, blistering, itching or irritation but so far, it does not appear that he is suffering from any dermatitis.  He will continue working on range of motion with physical therapy.  He sees Dr. Tariq on 6/25/2024 for recheck.  He was directed to notify us if he is not making gradual steady improvement in his range of motion.  He will continue tapering off the oxycodone.  He will finish his course of 4 weeks of 162 mg of aspirin for DVT prophylaxis and we reviewed signs and symptoms of DVT to monitor for. Stephane has our clinic number and will call with any questions or concerns.    Ana Kingsley PA-C  Orthopaedic Surgery    _________________________________    HISTORY OF PRESENT ILLNESS:  Stephane Shaikh " is a 69 year old male who is approximately 2 weeks status post the above procedure.    Feeling more stiff this time around compared to his other knee. Initially had a lot of swelling, but now starting to go down. Icing and elevate, compression sleeve on all the time. Getting around with crutches and furniture walking now and getting around more easily without crutches. He is up to 95 on the CPM. Feels like he's turned a corner in the last few days for motion.    Weight bearing status/devices: WBAT  Pain level and management: pain is improving.  He is now on oxycodone just at night 1 tablet.  He states it feels better when he is up and moving and doing his physical therapy exercises during the day.  He does not need a refill today.  He is also taking Tylenol.  Physical therapy & ROM: Seeing Noble Barrera of [facility] he reports no recent range of motion measurements.  He initially was dealing with stiffness related to significant swelling but this has improved.    Surgical dressing has been in place with small amount of strikethrough bleeding that has been dry and stable.  Stephane denies recent fevers and chills, as well as any other symptoms concerning for infection.     DVT prophylaxis: ASA 162mg daily x 4 weeks  Patient denies calf pain or tenderness.      MEDICATIONS:   Current Outpatient Rx   Medication Sig Dispense Refill    acetaminophen (TYLENOL) 325 MG tablet Take 2 tablets (650 mg) by mouth every 4 hours as needed for other (mild pain) 100 tablet 0    aspirin 81 MG EC tablet Take 1 tablet (81 mg) by mouth 2 times daily for 28 days 56 tablet 0    atorvastatin (LIPITOR) 10 MG tablet Take 1 tablet (10 mg) by mouth daily 90 tablet 0    glucosamine 500 MG CAPS capsule Take 1,000 mg by mouth daily      Melatonin 10 MG TABS tablet Take 10 mg by mouth nightly as needed for sleep      methocarbamol (ROBAXIN) 500 MG tablet Take 1 tablet (500 mg) by mouth every 6 hours as needed for muscle spasms 30 tablet 0     multivitamin w/minerals (MULTI-VITAMIN) tablet Take 1 tablet by mouth daily      Omega-3 Fatty Acids (FISH OIL) 1200 MG capsule Take 1,200 mg by mouth daily      oxyCODONE (ROXICODONE) 5 MG tablet Take 1-2 tablets (5-10 mg) by mouth every 8 hours as needed for moderate to severe pain 26 tablet 0    senna-docusate (SENOKOT-S/PERICOLACE) 8.6-50 MG tablet Take 1-2 tablets by mouth 2 times daily as needed for constipation Take while on oral narcotics to prevent or treat constipation. 30 tablet 0         ALLERGIES: Liquid adhesive       PHYSICAL EXAMINATION:   The physical exam is limited due to the nature of this virtual visit. The patient is comfortable and in no acute distress.  He is seated in a recliner.  His wife is helping with the call.  The affect is appropriate and breathing is non-labored. The incision (inspected via virtual conferencing) appears dry, without drainage or discharge.  No significant erythema.  ROM: Flexion and extension appears supple  Able to straight leg raise with demonstrating good quad control and strength.  Moderate swelling about the knee.  No swelling about the ankle.  Calf is compressible and soft.    Motor intact distally throughout the tibial and peroneal nerve distributions, intact ankle plantarflexion, dorsiflexion, toe flexion and extension.

## 2024-06-07 NOTE — LETTER
"6/7/2024      Stephane Shaikh  [address] [address]      Dear Colleague,    Thank you for referring your patient, Stephane Shaikh, to the [facility] [place]. Please see a copy of my visit note below.    The patient was informed of the following:    \"This virtual visit will be conducted via a call between you and your physician/provider. We have found that certain health care needs can be provided without the need for an extensive physical exam.  This service lets us provide the care you need with a short phone conversation.  If a prescription is necessary we can send it directly to your pharmacy.  If lab work is needed we can place an order for that and you can then stop by our lab to have the test done at a later time.     If during the course of the call the physician/provider feels a virtual visit is not appropriate, you will not be charged for this service.\"     _________________________________      ASSESSMENT/PLAN:  Stephane Shaikh is a 69 year old who is status post Left TKA with Dr. Tariq on 5/23/24.  Healing well.    Incision without signs of blistering or irritation as was the case following his right knee.  He was instructed on trimming Monocryl suture tails at the level of the skin after alcohol swab.  He may shower and pat dry and let Steri-Strips begin to fall off.  If they are still in in 10 days, he was directed to encourage them off.  He was counseled to notify clinic urgently with any signs of drainage, blistering, itching or irritation but so far, it does not appear that he is suffering from any dermatitis.  He will continue working on range of motion with physical therapy.  He sees Dr. Tariq on 6/25/2024 for recheck.  He was directed to notify us if he is not making gradual steady improvement in his range of motion.  He will continue tapering off the oxycodone.  He will finish his course of 4 weeks of 162 mg of aspirin for DVT prophylaxis and we " reviewed signs and symptoms of DVT to monitor for. Stephane has our clinic number and will call with any questions or concerns.    Ana Kinglsey PA-C  Orthopaedic Surgery    _________________________________    HISTORY OF PRESENT ILLNESS:  Stephane Shaikh is a 69 year old male who is approximately 2 weeks status post the above procedure.    Feeling more stiff this time around compared to his other knee. Initially had a lot of swelling, but now starting to go down. Icing and elevate, compression sleeve on all the time. Getting around with crutches and furniture walking now and getting around more easily without crutches. He is up to 95 on the CPM. Feels like he's turned a corner in the last few days for motion.    Weight bearing status/devices: WBAT  Pain level and management: pain is improving.  He is now on oxycodone just at night 1 tablet.  He states it feels better when he is up and moving and doing his physical therapy exercises during the day.  He does not need a refill today.  He is also taking Tylenol.  Physical therapy & ROM: Seeing Noble Barrera of [facility] he reports no recent range of motion measurements.  He initially was dealing with stiffness related to significant swelling but this has improved.    Surgical dressing has been in place with small amount of strikethrough bleeding that has been dry and stable.  Stephane denies recent fevers and chills, as well as any other symptoms concerning for infection.     DVT prophylaxis: ASA 162mg daily x 4 weeks  Patient denies calf pain or tenderness.      MEDICATIONS:   Current Outpatient Rx   Medication Sig Dispense Refill    acetaminophen (TYLENOL) 325 MG tablet Take 2 tablets (650 mg) by mouth every 4 hours as needed for other (mild pain) 100 tablet 0    aspirin 81 MG EC tablet Take 1 tablet (81 mg) by mouth 2 times daily for 28 days 56 tablet 0    atorvastatin (LIPITOR) 10 MG tablet Take 1 tablet (10 mg) by mouth daily 90 tablet 0    glucosamine 500 MG  CAPS capsule Take 1,000 mg by mouth daily      Melatonin 10 MG TABS tablet Take 10 mg by mouth nightly as needed for sleep      methocarbamol (ROBAXIN) 500 MG tablet Take 1 tablet (500 mg) by mouth every 6 hours as needed for muscle spasms 30 tablet 0    multivitamin w/minerals (MULTI-VITAMIN) tablet Take 1 tablet by mouth daily      Omega-3 Fatty Acids (FISH OIL) 1200 MG capsule Take 1,200 mg by mouth daily      oxyCODONE (ROXICODONE) 5 MG tablet Take 1-2 tablets (5-10 mg) by mouth every 8 hours as needed for moderate to severe pain 26 tablet 0    senna-docusate (SENOKOT-S/PERICOLACE) 8.6-50 MG tablet Take 1-2 tablets by mouth 2 times daily as needed for constipation Take while on oral narcotics to prevent or treat constipation. 30 tablet 0         ALLERGIES: Liquid adhesive       PHYSICAL EXAMINATION:   The physical exam is limited due to the nature of this virtual visit. The patient is comfortable and in no acute distress.  He is seated in a recliner.  His wife is helping with the call.  The affect is appropriate and breathing is non-labored. The incision (inspected via virtual conferencing) appears dry, without drainage or discharge.  No significant erythema.  ROM: Flexion and extension appears supple  Able to straight leg raise with demonstrating good quad control and strength.  Moderate swelling about the knee.  No swelling about the ankle.  Calf is compressible and soft.    Motor intact distally throughout the tibial and peroneal nerve distributions, intact ankle plantarflexion, dorsiflexion, toe flexion and extension.    Ana Kingsley PA-C

## 2024-06-18 DIAGNOSIS — Z96.652 S/P TOTAL KNEE ARTHROPLASTY, LEFT: Primary | ICD-10-CM

## 2024-06-25 ENCOUNTER — OFFICE VISIT (OUTPATIENT)
Dept: ORTHOPEDICS | Facility: CLINIC | Age: 69
End: 2024-06-25
Payer: COMMERCIAL

## 2024-06-25 ENCOUNTER — ANCILLARY PROCEDURE (OUTPATIENT)
Dept: GENERAL RADIOLOGY | Facility: CLINIC | Age: 69
End: 2024-06-25
Attending: ORTHOPAEDIC SURGERY
Payer: COMMERCIAL

## 2024-06-25 DIAGNOSIS — Z98.890 S/P KNEE SURGERY: Primary | ICD-10-CM

## 2024-06-25 DIAGNOSIS — Z96.652 S/P TOTAL KNEE ARTHROPLASTY, LEFT: ICD-10-CM

## 2024-06-25 PROCEDURE — 73562 X-RAY EXAM OF KNEE 3: CPT | Mod: LT | Performed by: RADIOLOGY

## 2024-06-25 PROCEDURE — 99024 POSTOP FOLLOW-UP VISIT: CPT | Performed by: ORTHOPAEDIC SURGERY

## 2024-06-25 ASSESSMENT — KOOS JR
RISING FROM SITTING: MILD
TWISING OR PIVOTING ON KNEE: MILD
STANDING UPRIGHT: MILD
BENDING TO THE FLOOR TO PICK UP OBJECT: MILD
GOING UP OR DOWN STAIRS: MILD
KOOS JR SCORING: 68.28
STRAIGHTENING KNEE FULLY: MILD
HOW SEVERE IS YOUR KNEE STIFFNESS AFTER FIRST WAKING IN MORNING: MILD

## 2024-06-25 NOTE — LETTER
6/25/2024      Stephane Shaikh  [address] [address]      Dear Colleague,    Thank you for referring your patient, Stephane Shaikh, to the [facility] [place]. Please see a copy of my visit note below.    Patient is a 69-year-old male who is now just shy of 5 weeks status post a left total knee replacement.  (DOS 5/23/2024) he reports an achy type of discomfort but is not taking any meds.  He had his opposite right knee done 5/11/2023 and he felt that that knee was a lot quicker to get back his motion.    His best efforts with physical therapy has been 8 degrees shy of full extension to 111 degrees of flexion.  It is his lack of extension that he is working on most diligently.    He and his wife are currently in the cities where they are staying with his daughter.  He is going to therapy twice a week.  When he goes back to [place] which will likely be this weekend he will have access to a stationary bike at home.    Important to note that his preop range of motion on this knee was 15 degrees to 115 degrees so some limitation in both flexion and extension.    His right knee preop range of motion was 8-1 22    Physical exam of patient's left knee reveals benign-appearing incision 1 small stitch was visible underneath his incision which was readily removed.  Incision otherwise benign.  Good straight leg raising effort without a lag.  Range of motion 8 degrees to 110.  Kneecap tracks well through an active arc of motion.  Stable to varus valgus stressing.    Right knee range of motion 0-1 30    Imaging was taken today.  On the left knee they show satisfactory component positioning and satisfactory alignment    Assessment: Satisfactory component positioning with fair motion at this point in time postop    Plan: I have given the patient strategies to work on extension.  He we will go back up north to [place].  He his wife has an appointment this fall.  He will come down with  her and we will have a dual appointment.  If he is not getting satisfactory range of motion which at a minimum to me would be 5 degrees to 125 degrees, he will contact me sooner.    Lorena Tariq MD  Professor Orthopedic Surgery  [facility]

## 2024-06-25 NOTE — NURSING NOTE
Reason For Visit:   Chief Complaint   Patient presents with    RECHECK     DOS: 5/23/24    Total Knee Arthroplasty (Left)       Primary MD: Sixto Tran  Referring MD: est.    ?  No  Occupation retired.     Date of injury: No  Type of injury: No.       Dates of surgery: 30 years ago, 5/11/23, 5/23/24  Type of Surgery:   Left knee scope   Total knee arthoplasty right  Left knee intra -articular knee injection (Erikelog)  Left total knee arthroplasty       Smoker: No  Request smoking cessation information: No    Pain Assessment  Patient Currently in Pain: Yes  Patient's Stated Pain Goal: 2

## 2024-06-25 NOTE — PROGRESS NOTES
Patient is a 69-year-old male who is now just shy of 5 weeks status post a left total knee replacement.  (DOS 5/23/2024) he reports an achy type of discomfort but is not taking any meds.  He had his opposite right knee done 5/11/2023 and he felt that that knee was a lot quicker to get back his motion.    His best efforts with physical therapy has been 8 degrees shy of full extension to 111 degrees of flexion.  It is his lack of extension that he is working on most diligently.    He and his wife are currently in the cities where they are staying with his daughter.  He is going to therapy twice a week.  When he goes back to [place] which will likely be this weekend he will have access to a stationary bike at home.    Important to note that his preop range of motion on this knee was 15 degrees to 115 degrees so some limitation in both flexion and extension.    His right knee preop range of motion was 8-1 22    Physical exam of patient's left knee reveals benign-appearing incision 1 small stitch was visible underneath his incision which was readily removed.  Incision otherwise benign.  Good straight leg raising effort without a lag.  Range of motion 8 degrees to 110.  Kneecap tracks well through an active arc of motion.  Stable to varus valgus stressing.    Right knee range of motion 0-1 30    Imaging was taken today.  On the left knee they show satisfactory component positioning and satisfactory alignment    Assessment: Satisfactory component positioning with fair motion at this point in time postop    Plan: I have given the patient strategies to work on extension.  He we will go back up north to [place].  He his wife has an appointment this fall.  He will come down with her and we will have a dual appointment.  If he is not getting satisfactory range of motion which at a minimum to me would be 5 degrees to 125 degrees, he will contact me sooner.    Lorena Tariq MD  Professor Orthopedic Surgery  [facility]  [place]

## 2024-07-09 DIAGNOSIS — E78.5 DYSLIPIDEMIA: ICD-10-CM

## 2024-07-09 NOTE — TELEPHONE ENCOUNTER
Disp Refills Start End BECCA   atorvastatin (LIPITOR) 10 MG tablet 90 tablet 0 5/6/2024 -- No     Last Office Visit: 05/06/2024  Future Office visit:       Routing refill request to provider for review/approval because:

## 2024-07-10 NOTE — TELEPHONE ENCOUNTER
Antihyperlipidemic agents [id number]/09/2024 10:02 AM   Protocol Details LDL on file in the past 12 months    Medication is active on med list

## 2024-07-11 ENCOUNTER — MYC MEDICAL ADVICE (OUTPATIENT)
Dept: FAMILY MEDICINE | Facility: OTHER | Age: 69
End: 2024-07-11

## 2024-07-11 DIAGNOSIS — E78.5 DYSLIPIDEMIA: ICD-10-CM

## 2024-07-11 RX ORDER — ATORVASTATIN CALCIUM 10 MG/1
10 TABLET, FILM COATED ORAL DAILY
Qty: 90 TABLET | Refills: 0 | Status: SHIPPED | OUTPATIENT
Start: 2024-07-11 | End: 2024-10-01

## 2024-07-11 RX ORDER — ATORVASTATIN CALCIUM 10 MG/1
10 TABLET, FILM COATED ORAL DAILY
Qty: 90 TABLET | Refills: 0 | OUTPATIENT
Start: 2024-07-11

## 2024-08-02 DIAGNOSIS — Z12.11 COLON CANCER SCREENING: ICD-10-CM

## 2024-08-04 ENCOUNTER — HEALTH MAINTENANCE LETTER (OUTPATIENT)
Age: 69
End: 2024-08-04

## 2024-08-16 ENCOUNTER — ORDERS ONLY (AUTO-RELEASED) (OUTPATIENT)
Dept: ADMISSION | Facility: CLINIC | Age: 69
End: 2024-08-16
Payer: COMMERCIAL

## 2024-08-16 DIAGNOSIS — Z12.11 COLON CANCER SCREENING: ICD-10-CM

## 2024-09-30 DIAGNOSIS — E78.5 DYSLIPIDEMIA: ICD-10-CM

## 2024-09-30 NOTE — TELEPHONE ENCOUNTER
ATORVASTATIN 10 MG TABLET       Last Written Prescription Date:  07/11/2024  Last Fill Quantity: 90,   # refills: 0  Last Office Visit: 05/29/2024  Future Office visit:    Next 5 appointments (look out 90 days)      Oct 22, 2024 9:20 AM  (Arrive by 9:05 AM)  Return Visit with Lorena Tariq MD  [facility] [place] ([facility] ) [address] [place] [address]  [address] [facility] [id number] [phone]             Routing refill request to provider for review/approval because:    Antihyperlipidemic agents [id number]/30/2024 02:04 PM   Protocol Details LDL on file in the past 12 months        Kimberly Boecker, RN

## 2024-10-01 ENCOUNTER — MYC MEDICAL ADVICE (OUTPATIENT)
Dept: FAMILY MEDICINE | Facility: OTHER | Age: 69
End: 2024-10-01

## 2024-10-01 RX ORDER — ATORVASTATIN CALCIUM 10 MG/1
10 TABLET, FILM COATED ORAL DAILY
Qty: 90 TABLET | Refills: 1 | Status: SHIPPED | OUTPATIENT
Start: 2024-10-01 | End: 2024-10-09

## 2024-10-01 NOTE — TELEPHONE ENCOUNTER
Pt sent my chart as well. He is requesting a year supply of med but due for lab work. See message below.  Recent Labs   Lab Test 05/02/23  1026 09/16/21  0734   CHOL 157 127   HDL 54 55   LDL 81 49   TRIG 108 114    Radha Dejesus RN

## 2024-10-02 NOTE — TELEPHONE ENCOUNTER
Looks like he's due for a medication appt.  His visits back in May were preop visits.  I sent in some refills, but should get him scheduled.  Thanks.

## 2024-10-08 NOTE — PROGRESS NOTES
"  Assessment & Plan     Benign essential hypertension  - Albumin Random Urine Quantitative with Creat Ratio  Down 12 lbs from May, working on lifestyle modifications prior to meds.    Dyslipidemia  - atorvastatin (LIPITOR) 10 MG tablet; Take 1 tablet (10 mg) by mouth daily.  - Lipid Profile    Obesity BMI 33  As above      Follow-up in a few months to ensure still making progress with weight loss, also BP.      Lara Vargas is a 69 year old, presenting for the following health issues:  Lipids        10/9/2024    10:21 AM   Additional Questions   Roomed by jh walker   Accompanied by none         10/9/2024    10:21 AM   Patient Reported Additional Medications   Patient reports taking the following new medications none     History of Present Illness       Hyperlipidemia:  He presents for follow up of hyperlipidemia.   He is taking medication to lower cholesterol. He is not having myalgia or other side effects to statin medications.    He eats 2-3 servings of fruits and vegetables daily.He consumes 0 sweetened beverage(s) daily.He exercises with enough effort to increase his heart rate 60 or more minutes per day.  He exercises with enough effort to increase his heart rate 4 days per week.   He is taking medications regularly.       Hyperlipidemia Follow-Up    Are you regularly taking any medication or supplement to lower your cholesterol?   Yes- atorvastatin   Are you having muscle aches or other side effects that you think could be caused by your cholesterol lowering medication?  No      Objective    /84 (BP Location: Left arm, Patient Position: Sitting, Cuff Size: Adult Large)   Pulse 83   Temp 97  F (36.1  C) (Tympanic)   Resp 18   Ht 1.803 m (5' 11\")   Wt 109.2 kg (240 lb 12.8 oz)   SpO2 96%   BMI 33.58 kg/m    Body mass index is 33.58 kg/m .  Physical Exam  Constitutional:       General: He is not in acute distress.     Appearance: Normal appearance.   Cardiovascular:      Rate and Rhythm: Normal " rate and regular rhythm.      Heart sounds: Normal heart sounds. No murmur heard.  Pulmonary:      Effort: Pulmonary effort is normal.      Breath sounds: Normal breath sounds.   Neurological:      Mental Status: He is alert and oriented to person, place, and time.                    Signed Electronically by: MENDEZ DE LEON DO

## 2024-10-09 ENCOUNTER — OFFICE VISIT (OUTPATIENT)
Dept: FAMILY MEDICINE | Facility: OTHER | Age: 69
End: 2024-10-09
Attending: FAMILY MEDICINE
Payer: MEDICARE

## 2024-10-09 VITALS
DIASTOLIC BLOOD PRESSURE: 84 MMHG | RESPIRATION RATE: 18 BRPM | HEART RATE: 83 BPM | SYSTOLIC BLOOD PRESSURE: 135 MMHG | WEIGHT: 240.8 LBS | OXYGEN SATURATION: 96 % | TEMPERATURE: 97 F | BODY MASS INDEX: 33.71 KG/M2 | HEIGHT: 71 IN

## 2024-10-09 DIAGNOSIS — I10 BENIGN ESSENTIAL HYPERTENSION: Primary | ICD-10-CM

## 2024-10-09 DIAGNOSIS — E78.5 DYSLIPIDEMIA: ICD-10-CM

## 2024-10-09 DIAGNOSIS — E66.811 CLASS 1 OBESITY DUE TO EXCESS CALORIES WITH SERIOUS COMORBIDITY AND BODY MASS INDEX (BMI) OF 33.0 TO 33.9 IN ADULT: ICD-10-CM

## 2024-10-09 DIAGNOSIS — E66.09 CLASS 1 OBESITY DUE TO EXCESS CALORIES WITH SERIOUS COMORBIDITY AND BODY MASS INDEX (BMI) OF 33.0 TO 33.9 IN ADULT: ICD-10-CM

## 2024-10-09 LAB
CHOLEST SERPL-MCNC: 149 MG/DL
CREAT UR-MCNC: 233.1 MG/DL
FASTING STATUS PATIENT QL REPORTED: NO
HDLC SERPL-MCNC: 52 MG/DL
LDLC SERPL CALC-MCNC: 72 MG/DL
MICROALBUMIN UR-MCNC: 20.9 MG/L
MICROALBUMIN/CREAT UR: 8.97 MG/G CR (ref 0–17)
NONHDLC SERPL-MCNC: 97 MG/DL
NONINV COLON CA DNA+OCC BLD SCRN STL QL: NEGATIVE
TRIGL SERPL-MCNC: 124 MG/DL

## 2024-10-09 PROCEDURE — 99214 OFFICE O/P EST MOD 30 MIN: CPT | Performed by: FAMILY MEDICINE

## 2024-10-09 PROCEDURE — 90480 ADMN SARSCOV2 VAC 1/ONLY CMP: CPT

## 2024-10-09 PROCEDURE — 91320 SARSCV2 VAC 30MCG TRS-SUC IM: CPT

## 2024-10-09 PROCEDURE — 36415 COLL VENOUS BLD VENIPUNCTURE: CPT | Mod: ZL | Performed by: FAMILY MEDICINE

## 2024-10-09 PROCEDURE — 80061 LIPID PANEL: CPT | Mod: ZL | Performed by: FAMILY MEDICINE

## 2024-10-09 PROCEDURE — G0463 HOSPITAL OUTPT CLINIC VISIT: HCPCS | Mod: 25

## 2024-10-09 PROCEDURE — 82043 UR ALBUMIN QUANTITATIVE: CPT | Mod: ZL | Performed by: FAMILY MEDICINE

## 2024-10-09 PROCEDURE — G0008 ADMIN INFLUENZA VIRUS VAC: HCPCS

## 2024-10-09 RX ORDER — ATORVASTATIN CALCIUM 10 MG/1
10 TABLET, FILM COATED ORAL DAILY
Qty: 90 TABLET | Refills: 3 | Status: SHIPPED | OUTPATIENT
Start: 2024-10-09

## 2024-10-22 ENCOUNTER — ANCILLARY PROCEDURE (OUTPATIENT)
Dept: GENERAL RADIOLOGY | Facility: CLINIC | Age: 69
End: 2024-10-22
Attending: ORTHOPAEDIC SURGERY
Payer: COMMERCIAL

## 2024-10-22 ENCOUNTER — OFFICE VISIT (OUTPATIENT)
Dept: ORTHOPEDICS | Facility: CLINIC | Age: 69
End: 2024-10-22
Payer: COMMERCIAL

## 2024-10-22 DIAGNOSIS — Z96.652 S/P TOTAL KNEE ARTHROPLASTY, LEFT: ICD-10-CM

## 2024-10-22 DIAGNOSIS — Z96.652 S/P TOTAL KNEE ARTHROPLASTY, LEFT: Primary | ICD-10-CM

## 2024-10-22 DIAGNOSIS — Z98.890 S/P KNEE SURGERY: Primary | ICD-10-CM

## 2024-10-22 PROCEDURE — 99213 OFFICE O/P EST LOW 20 MIN: CPT | Performed by: ORTHOPAEDIC SURGERY

## 2024-10-22 PROCEDURE — 77073 BONE LENGTH STUDIES: CPT | Performed by: STUDENT IN AN ORGANIZED HEALTH CARE EDUCATION/TRAINING PROGRAM

## 2024-10-22 NOTE — NURSING NOTE
Reason For Visit:   Chief Complaint   Patient presents with    RECHECK     DOS: 5/23/24 // Total Knee Arthroplasty (Left)       Primary MD: Sixto Tran  Ref. MD: EST    ?  No  Occupation retired.     Date of injury: No  Type of injury: No.        Dates of surgery: 30 years ago, 5/11/23, 5/23/24  Type of Surgery:   Left knee scope   Total knee arthoplasty right  Left knee intra -articular knee injection (Erikelog)  Left total knee arthroplasty         Smoker: No  Request smoking cessation information: No    There were no vitals taken for this visit.    Pain Assessment  Patient Currently in Pain: Yes  0-10 Pain Scale: 1  Primary Pain Location: Knee        Susan ALBERT RaphaelN

## 2024-10-22 NOTE — LETTER
10/22/2024      Stephane Shaikh  [address] [id number]      Dear Colleague,    Thank you for referring your patient, Stephane Shaikh, to the [facility] Section. Please see a copy of my visit note below.    Patient is a 69-year-old male who is 4 months status post left total knee replacement (DOS 5/23/2024).  He is status post right total knee replacement 5/11/2023.    He is delighted with his results today.  He has no complaints.  However he did notice more recently a slight noise or crunch to his right knee that is in deep flexion only.  It does not hurt, it is not associated with swelling, but is noticed that over the last few months    Physical exam of patient's left knee reveals benign-appearing incisions good straight leg raising effort without a lag range of motion 0-1 20 with a soft endpoint    Right knee range of motion 0-1 25.  Indeed in deep flexion there is crepitus that persists in the first 20 degrees or so deep flexion to extension    X-rays were reviewed and show satisfactory component positioning.  Long-leg alignment: The tibias are parallel to the floor.  There is a 2 degree varus alignment on both knees.    In review of what the patient's been doing in regards to rehab, he reports that he goes to the gym on a regular basis and he does do leg extension and other open chain activities at the knee.  I explained the difference in open versus close chain activities, and indeed the worst is probably open chain leg extension.  I believe that this has been responsible in part for his crepitus.    I believe that this should likely resolve once he is stopping doing heavy knee extension in an open chain fashion.    Patient is doing well with these knees in the early postoperative time.    Follow-up as needed.    Lorena Tariq MD  Professor Orthopedic Surgery  [facility]         Again, thank you for allowing me to participate in the care of  your patient.        Sincerely,        Lorena Tarqi MD

## 2024-10-22 NOTE — PROGRESS NOTES
Patient is a 69-year-old male who is 4 months status post left total knee replacement (DOS 5/23/2024).  He is status post right total knee replacement 5/11/2023.    He is delighted with his results today.  He has no complaints.  However he did notice more recently a slight noise or crunch to his right knee that is in deep flexion only.  It does not hurt, it is not associated with swelling, but is noticed that over the last few months    Physical exam of patient's left knee reveals benign-appearing incisions good straight leg raising effort without a lag range of motion 0-1 20 with a soft endpoint    Right knee range of motion 0-1 25.  Indeed in deep flexion there is crepitus that persists in the first 20 degrees or so deep flexion to extension    X-rays were reviewed and show satisfactory component positioning.  Long-leg alignment: The tibias are parallel to the floor.  There is a 2 degree varus alignment on both knees.    In review of what the patient's been doing in regards to rehab, he reports that he goes to the gym on a regular basis and he does do leg extension and other open chain activities at the knee.  I explained the difference in open versus close chain activities, and indeed the worst is probably open chain leg extension.  I believe that this has been responsible in part for his crepitus.    I believe that this should likely resolve once he is stopping doing heavy knee extension in an open chain fashion.    Patient is doing well with these knees in the early postoperative time.    Follow-up as needed.    Lorena Tariq MD  Professor Orthopedic Surgery  [facility]

## 2025-01-08 DIAGNOSIS — E78.5 DYSLIPIDEMIA: ICD-10-CM

## 2025-01-08 RX ORDER — ATORVASTATIN CALCIUM 10 MG/1
10 TABLET, FILM COATED ORAL DAILY
Qty: 90 TABLET | Refills: 2 | Status: SHIPPED | OUTPATIENT
Start: 2025-01-08

## 2025-01-08 NOTE — TELEPHONE ENCOUNTER
Atorvastatin (Lipitor) 10 MG tablet     Last Written Prescription Date:  10/09/2024  Last Fill Quantity: 90,   # refills: 2  Last Office Visit: 10/09/2024  Future Office visit:       Routing refill request to provider for review/approval because:

## 2025-07-08 ENCOUNTER — TELEPHONE (OUTPATIENT)
Dept: FAMILY MEDICINE | Facility: OTHER | Age: 70
End: 2025-07-08

## (undated) DEVICE — DRSG TEGADERM 4X4 3/4" 1626W

## (undated) DEVICE — PREP CHLORAPREP 26ML TINTED HI-LITE ORANGE 930815

## (undated) DEVICE — BLADE KNIFE SURG 15 371115

## (undated) DEVICE — SOL NACL 0.9% IRRIG 1000ML BOTTLE 2F7124

## (undated) DEVICE — ESU PENCIL W/SMOKE EVAC NEPTUNE STRYKER 0703-046-000

## (undated) DEVICE — SU ETHIBOND 1 CT-1 30" X425H

## (undated) DEVICE — BLADE SAW SAGITTAL STRK 18X90X1.27MM HD SYS 6 6118-127-090

## (undated) DEVICE — BLADE SAW RECIP STRK 70X12.5X1.2MM 0277-096-281

## (undated) DEVICE — TOURNIQUET CUFF 34" REPRO BROWN 60-7070-106

## (undated) DEVICE — SPONGE LAP 18X18" X8435

## (undated) DEVICE — DRAPE SLEEVE 599

## (undated) DEVICE — DECANTER VIAL 2006S

## (undated) DEVICE — HOOD SURG T7PLUS PEEL AWAY FACE SHIELD STRL LF 0416-801-100

## (undated) DEVICE — BONE CLEANING TIP INTERPULSE  0210-010-000

## (undated) DEVICE — SU MONOCRYL 3-0 PS-1 27" Y936H

## (undated) DEVICE — POSITIONER ARMBOARD FOAM 1PAIR LF FP-ARMB1

## (undated) DEVICE — BONE CEMENT MIXEVAC III HI VAC KIT  0206-015-000

## (undated) DEVICE — LINEN TOWEL PACK X5 5464

## (undated) DEVICE — LINEN ORTHO PACK 5446

## (undated) DEVICE — DRSG TEGADERM ALGINATE AG 4X5" 90303

## (undated) DEVICE — ADH SKIN CLOSURE PREMIERPRO EXOFIN 1.0ML 3470

## (undated) DEVICE — ESU GROUND PAD ADULT W/CORD E7507

## (undated) DEVICE — PREP BRUSH SURG SCRUB CHLOROXYLENOL PCMX 3% 371163

## (undated) DEVICE — GLOVE GAMMEX NEOPRENE ULTRA SZ 6.5 LF 8513

## (undated) DEVICE — SOL WATER IRRIG 1000ML BOTTLE 2F7114

## (undated) DEVICE — SOL NACL 0.9% IRRIG 3000ML BAG 2B7477

## (undated) DEVICE — STRAP KNEE/BODY 31143004

## (undated) DEVICE — SUCTION MANIFOLD NEPTUNE 2 SYS 4 PORT 0702-020-000

## (undated) DEVICE — BLADE KNIFE SURG 10 371110

## (undated) DEVICE — SU VICRYL 2-0 CT-2 27" UND J269H

## (undated) DEVICE — BASIN SET MAJOR

## (undated) DEVICE — Device

## (undated) DEVICE — GLOVE BIOGEL PI MICRO SZ 6.5 48565

## (undated) DEVICE — SUTURE VICRYL+ 2-0 CT-2 27" UND VCP269H

## (undated) DEVICE — NDL SPINAL 20GA 3.5" 405182

## (undated) DEVICE — GLOVE BIOGEL PI ULTRATOUCH G SZ 6.5 42165

## (undated) DEVICE — SUCTION IRR SYSTEM W/O TIP INTERPULSE HANDPIECE 0210-100-000

## (undated) DEVICE — GOWN IMPERVIOUS SPECIALTY XLG/XLONG 32474

## (undated) DEVICE — SUTURE VICRYL+ 1 CT-1 36" VCP347H

## (undated) DEVICE — BNDG SPANDAGRIP SZ J LF BEIGE 6.75" SAG13117

## (undated) DEVICE — SU VICRYL 1 CT-1 36" J347H

## (undated) RX ORDER — EPHEDRINE SULFATE 50 MG/ML
INJECTION, SOLUTION INTRAMUSCULAR; INTRAVENOUS; SUBCUTANEOUS
Status: DISPENSED
Start: 2023-05-11

## (undated) RX ORDER — DEXAMETHASONE SODIUM PHOSPHATE 4 MG/ML
INJECTION, SOLUTION INTRA-ARTICULAR; INTRALESIONAL; INTRAMUSCULAR; INTRAVENOUS; SOFT TISSUE
Status: DISPENSED
Start: 2023-05-11

## (undated) RX ORDER — FENTANYL CITRATE 50 UG/ML
INJECTION, SOLUTION INTRAMUSCULAR; INTRAVENOUS
Status: DISPENSED
Start: 2024-05-23

## (undated) RX ORDER — LIDOCAINE HYDROCHLORIDE 10 MG/ML
INJECTION, SOLUTION INFILTRATION; PERINEURAL
Status: DISPENSED
Start: 2022-10-04

## (undated) RX ORDER — ACETAMINOPHEN 325 MG/1
TABLET ORAL
Status: DISPENSED
Start: 2023-05-11

## (undated) RX ORDER — OXYCODONE HYDROCHLORIDE 5 MG/1
TABLET ORAL
Status: DISPENSED
Start: 2023-05-11

## (undated) RX ORDER — ONDANSETRON 2 MG/ML
INJECTION INTRAMUSCULAR; INTRAVENOUS
Status: DISPENSED
Start: 2024-05-23

## (undated) RX ORDER — LIDOCAINE HYDROCHLORIDE 10 MG/ML
INJECTION, SOLUTION INFILTRATION; PERINEURAL
Status: DISPENSED
Start: 2023-05-02

## (undated) RX ORDER — EPHEDRINE SULFATE 50 MG/ML
INJECTION, SOLUTION INTRAMUSCULAR; INTRAVENOUS; SUBCUTANEOUS
Status: DISPENSED
Start: 2024-05-23

## (undated) RX ORDER — CEFAZOLIN SODIUM/WATER 2 G/20 ML
SYRINGE (ML) INTRAVENOUS
Status: DISPENSED
Start: 2023-05-11

## (undated) RX ORDER — CELECOXIB 200 MG/1
CAPSULE ORAL
Status: DISPENSED
Start: 2023-05-11

## (undated) RX ORDER — LIDOCAINE HYDROCHLORIDE 10 MG/ML
INJECTION, SOLUTION INFILTRATION; PERINEURAL
Status: DISPENSED
Start: 2023-10-17

## (undated) RX ORDER — TRIAMCINOLONE ACETONIDE 40 MG/ML
INJECTION, SUSPENSION INTRA-ARTICULAR; INTRAMUSCULAR
Status: DISPENSED
Start: 2023-05-02

## (undated) RX ORDER — LIDOCAINE HYDROCHLORIDE 10 MG/ML
INJECTION, SOLUTION INFILTRATION; PERINEURAL
Status: DISPENSED
Start: 2022-05-10

## (undated) RX ORDER — LIDOCAINE HYDROCHLORIDE 10 MG/ML
INJECTION, SOLUTION EPIDURAL; INFILTRATION; INTRACAUDAL; PERINEURAL
Status: DISPENSED
Start: 2023-05-11

## (undated) RX ORDER — ONDANSETRON 2 MG/ML
INJECTION INTRAMUSCULAR; INTRAVENOUS
Status: DISPENSED
Start: 2023-05-11

## (undated) RX ORDER — FENTANYL CITRATE 50 UG/ML
INJECTION, SOLUTION INTRAMUSCULAR; INTRAVENOUS
Status: DISPENSED
Start: 2023-05-11

## (undated) RX ORDER — TRIAMCINOLONE ACETONIDE 40 MG/ML
INJECTION, SUSPENSION INTRA-ARTICULAR; INTRAMUSCULAR
Status: DISPENSED
Start: 2023-10-17

## (undated) RX ORDER — TRIAMCINOLONE ACETONIDE 40 MG/ML
INJECTION, SUSPENSION INTRA-ARTICULAR; INTRAMUSCULAR
Status: DISPENSED
Start: 2022-05-10

## (undated) RX ORDER — TRIAMCINOLONE ACETONIDE 40 MG/ML
INJECTION, SUSPENSION INTRA-ARTICULAR; INTRAMUSCULAR
Status: DISPENSED
Start: 2023-05-11

## (undated) RX ORDER — PROPOFOL 10 MG/ML
INJECTION, EMULSION INTRAVENOUS
Status: DISPENSED
Start: 2023-05-11

## (undated) RX ORDER — TRANEXAMIC ACID 650 MG/1
TABLET ORAL
Status: DISPENSED
Start: 2024-05-23

## (undated) RX ORDER — ACETAMINOPHEN 325 MG/1
TABLET ORAL
Status: DISPENSED
Start: 2024-05-23

## (undated) RX ORDER — CEFAZOLIN SODIUM/WATER 2 G/20 ML
SYRINGE (ML) INTRAVENOUS
Status: DISPENSED
Start: 2024-05-23

## (undated) RX ORDER — TRIAMCINOLONE ACETONIDE 40 MG/ML
INJECTION, SUSPENSION INTRA-ARTICULAR; INTRAMUSCULAR
Status: DISPENSED
Start: 2022-10-04

## (undated) RX ORDER — TRANEXAMIC ACID 650 MG/1
TABLET ORAL
Status: DISPENSED
Start: 2023-05-11